# Patient Record
Sex: FEMALE
[De-identification: names, ages, dates, MRNs, and addresses within clinical notes are randomized per-mention and may not be internally consistent; named-entity substitution may affect disease eponyms.]

---

## 2020-11-02 ENCOUNTER — HOSPITAL ENCOUNTER (EMERGENCY)
Dept: HOSPITAL 5 - ED | Age: 60
Discharge: HOME | End: 2020-11-02
Payer: SELF-PAY

## 2020-11-02 VITALS — SYSTOLIC BLOOD PRESSURE: 120 MMHG | DIASTOLIC BLOOD PRESSURE: 89 MMHG

## 2020-11-02 DIAGNOSIS — R91.8: ICD-10-CM

## 2020-11-02 DIAGNOSIS — C22.0: Primary | ICD-10-CM

## 2020-11-02 DIAGNOSIS — C79.51: ICD-10-CM

## 2020-11-02 LAB
ALBUMIN SERPL-MCNC: 3.7 G/DL (ref 3.9–5)
ALBUMIN SERPL-MCNC: 3.7 G/DL (ref 3.9–5)
ALT SERPL-CCNC: 13 UNITS/L (ref 7–56)
ALT SERPL-CCNC: 14 UNITS/L (ref 7–56)
APTT BLD: 29.2 SEC. (ref 24.2–36.6)
BACTERIA #/AREA URNS HPF: (no result) /HPF
BASOPHILS # (AUTO): 0.1 K/MM3 (ref 0–0.1)
BASOPHILS NFR BLD AUTO: 0.7 % (ref 0–1.8)
BILIRUB DIRECT SERPL-MCNC: < 0.2 MG/DL (ref 0–0.2)
BILIRUB UR QL STRIP: (no result)
BLOOD UR QL VISUAL: (no result)
BUN SERPL-MCNC: 6 MG/DL (ref 7–17)
BUN/CREAT SERPL: 12 %
CALCIUM SERPL-MCNC: 9.1 MG/DL (ref 8.4–10.2)
EOSINOPHIL # BLD AUTO: 0.1 K/MM3 (ref 0–0.4)
EOSINOPHIL NFR BLD AUTO: 0.5 % (ref 0–4.3)
HCT VFR BLD CALC: 42.8 % (ref 30.3–42.9)
HEMOLYSIS INDEX: 4
HGB BLD-MCNC: 15.1 GM/DL (ref 10.1–14.3)
INR PPP: 2.37 (ref 0.87–1.13)
LYMPHOCYTES # BLD AUTO: 1.8 K/MM3 (ref 1.2–5.4)
LYMPHOCYTES NFR BLD AUTO: 11.5 % (ref 13.4–35)
MCHC RBC AUTO-ENTMCNC: 35 % (ref 30–34)
MCV RBC AUTO: 91 FL (ref 79–97)
MONOCYTES # (AUTO): 1.6 K/MM3 (ref 0–0.8)
MONOCYTES % (AUTO): 9.7 % (ref 0–7.3)
PH UR STRIP: 6 [PH] (ref 5–7)
PLATELET # BLD: 413 K/MM3 (ref 140–440)
PROT UR STRIP-MCNC: (no result) MG/DL
RBC # BLD AUTO: 4.7 M/MM3 (ref 3.65–5.03)
RBC #/AREA URNS HPF: 2 /HPF (ref 0–6)
UROBILINOGEN UR-MCNC: < 2 MG/DL (ref ?–2)
WBC #/AREA URNS HPF: 1 /HPF (ref 0–6)

## 2020-11-02 PROCEDURE — 83880 ASSAY OF NATRIURETIC PEPTIDE: CPT

## 2020-11-02 PROCEDURE — 81001 URINALYSIS AUTO W/SCOPE: CPT

## 2020-11-02 PROCEDURE — 93005 ELECTROCARDIOGRAM TRACING: CPT

## 2020-11-02 PROCEDURE — 85379 FIBRIN DEGRADATION QUANT: CPT

## 2020-11-02 PROCEDURE — 96374 THER/PROPH/DIAG INJ IV PUSH: CPT

## 2020-11-02 PROCEDURE — 80053 COMPREHEN METABOLIC PANEL: CPT

## 2020-11-02 PROCEDURE — 36415 COLL VENOUS BLD VENIPUNCTURE: CPT

## 2020-11-02 PROCEDURE — 84484 ASSAY OF TROPONIN QUANT: CPT

## 2020-11-02 PROCEDURE — 83735 ASSAY OF MAGNESIUM: CPT

## 2020-11-02 PROCEDURE — 99285 EMERGENCY DEPT VISIT HI MDM: CPT

## 2020-11-02 PROCEDURE — 71045 X-RAY EXAM CHEST 1 VIEW: CPT

## 2020-11-02 PROCEDURE — 85025 COMPLETE CBC W/AUTO DIFF WBC: CPT

## 2020-11-02 PROCEDURE — 71275 CT ANGIOGRAPHY CHEST: CPT

## 2020-11-02 PROCEDURE — 85730 THROMBOPLASTIN TIME PARTIAL: CPT

## 2020-11-02 PROCEDURE — 80076 HEPATIC FUNCTION PANEL: CPT

## 2020-11-02 PROCEDURE — 85610 PROTHROMBIN TIME: CPT

## 2020-11-02 PROCEDURE — 94644 CONT INHLJ TX 1ST HOUR: CPT

## 2020-11-02 NOTE — XRAY REPORT
CHEST 1 VIEW 



INDICATION:  Chest Pain.



COMPARISON:  None



FINDINGS:

Support devices: None. 



Heart: Within normal limits. 

Lungs/Pleura: There is a focal area of ill-defined airspace density in the right upper lobe measuring
 up to 4 cm. The remainder of the lungs are clear. No pleural effusion or pneumothorax. 



Additional findings: Bilateral breast prostheses are noted.



IMPRESSION:

 Right upper lobe airspace opacity is appreciated. This may represent an early infiltrate although ot
her etiologies are not excluded. Consider further evaluation with CT with contrast.



Signer Name: Isaias Horton Jr, MD 

Signed: 11/2/2020 11:46 AM

Workstation Name: [id number]

## 2020-11-02 NOTE — EMERGENCY DEPARTMENT REPORT
ED Shortness of Breath HPI





- General


Chief Complaint: Dyspnea/Respdistress


Stated Complaint: CHEST PAIN


Time Seen by Provider: 11/02/20 10:38


Source: patient


Mode of arrival: Ambulatory


Limitations: No Limitations





- History of Present Illness


Initial Comments: 





60-year-old female, no past medical history, presents to ED with cough and 

shortness of breath.  Patient states symptoms have been ongoing x1 week.  States

cough is productive of sputum.  Patient denies any chest pain, but reports some 

epigastric pain only with cough.  Patient states she was tested for COVID-19 2 

weeks ago which was negative.  She denies fever, sore throat, vomiting, diarrhe

a, loss of smell or taste.  She denies any leg pain or swelling.  Patient 

reports tobacco use.


MD Complaint: shortness of breath


-: week(s) (1)


Severity: moderate


Consistency: constant


Improves With: nothing


Worsens With: nothing


Associated Symptoms: sputum production


Treatments Prior to Arrival: none





- Related Data


Home Oxygen Therapy: No


                                  Previous Rx's











 Medication  Instructions  Recorded  Last Taken  Type


 


Ciprofloxacin HCl [Ciprofloxacin 500 mg PO Q12HR #14 tab 11/02/20 Unknown Rx





TAB]    











                                    Allergies











Allergy/AdvReac Type Severity Reaction Status Date / Time


 


No Known Allergies Allergy   Unverified 11/02/20 10:38














ED Review of Systems


ROS: 


Stated complaint: CHEST PAIN


Other details as noted in HPI





Comment: All other systems reviewed and negative


Constitutional: denies: chills, fever


ENT: denies: throat pain


Respiratory: cough, shortness of breath


Cardiovascular: denies: chest pain


Gastrointestinal: denies: nausea, vomiting, diarrhea


Musculoskeletal: other (Denies leg pain or swelling)





ED Past Medical Hx





- Past Medical History


Previous Medical History?: No





- Surgical History


Past Surgical History?: No





- Social History


Smoking Status: Never Smoker


Substance Use Type: None





- Medications


Home Medications: 


                                Home Medications











 Medication  Instructions  Recorded  Confirmed  Last Taken  Type


 


Ciprofloxacin HCl [Ciprofloxacin 500 mg PO Q12HR #14 tab 11/02/20  Unknown Rx





TAB]     














ED Physical Exam





- General


Limitations: No Limitations


General appearance: alert, in no apparent distress





- Head


Head exam: Present: atraumatic, normocephalic





- Eye


Eye exam: Present: normal appearance, EOMI





- ENT


ENT exam: Present: mucous membranes moist





- Neck


Neck exam: Present: normal inspection





- Respiratory


Respiratory exam: Present: wheezes (Scant), other (Slightly tachypneic)





- Cardiovascular


Cardiovascular Exam: Present: normal rhythm, tachycardia





- GI/Abdominal


GI/Abdominal exam: Present: soft.  Absent: distended, tenderness





- Extremities Exam


Extremities exam: Present: normal inspection.  Absent: pedal edema, calf 

tenderness





- Neurological Exam


Neurological exam: Present: alert, oriented X3





- Psychiatric


Psychiatric exam: Present: normal affect, normal mood





- Skin


Skin exam: Present: warm, dry, intact, normal color





ED Course


                                   Vital Signs











  11/02/20 11/02/20 11/02/20





  10:38 10:59 11:00


 


Temperature 98.5 F  


 


Pulse Rate 140 H 122 H 116 H


 


Pulse Rate [   





Anterior   





Bilateral   





Throughout]   


 


Respiratory 26 H 15 12





Rate   


 


Respiratory   





Rate [Anterior   





Bilateral   





Throughout]   


 


Blood Pressure   146/97


 


Blood Pressure 137/63  





[Right]   


 


O2 Sat by Pulse 97  97





Oximetry   














  11/02/20 11/02/20 11/02/20





  11:02 11:20 11:30


 


Temperature   


 


Pulse Rate 120 H  108 H


 


Pulse Rate [  115 H 





Anterior   





Bilateral   





Throughout]   


 


Respiratory 21  12





Rate   


 


Respiratory  18 





Rate [Anterior   





Bilateral   





Throughout]   


 


Blood Pressure   130/97


 


Blood Pressure 146/97  





[Right]   


 


O2 Sat by Pulse 97  





Oximetry   














  11/02/20 11/02/20 11/02/20





  12:00 13:00 14:30


 


Temperature   


 


Pulse Rate 109 H 114 H 113 H


 


Pulse Rate [   





Anterior   





Bilateral   





Throughout]   


 


Respiratory 14 15 16





Rate   


 


Respiratory   





Rate [Anterior   





Bilateral   





Throughout]   


 


Blood Pressure 140/92 136/80 131/85


 


Blood Pressure   





[Right]   


 


O2 Sat by Pulse 95  94





Oximetry   














  11/02/20 11/02/20 11/02/20





  15:00 15:30 16:00


 


Temperature   


 


Pulse Rate 111 H  


 


Pulse Rate [   





Anterior   





Bilateral   





Throughout]   


 


Respiratory 15  





Rate   


 


Respiratory   





Rate [Anterior   





Bilateral   





Throughout]   


 


Blood Pressure 121/85 120/92 120/89


 


Blood Pressure   





[Right]   


 


O2 Sat by Pulse 94 96 96





Oximetry   














ED Medical Decision Making





- Lab Data


Result diagrams: 


                                 11/02/20 11:04





                                 11/02/20 11:04





- EKG Data


-: EKG Interpreted by Me


EKG shows normal: sinus rhythm, axis, intervals, QRS complexes, ST-T waves


Rate: tachycardia (rate 123)





- EKG Data


Interpretation: no acute changes





- Radiology Data


Radiology results: report reviewed, image reviewed





- Medical Decision Making





60-year-old female, no known medical history, presents to ED with shortness of 

breath, cough productive of sputum.  Patient initially presents tachycardic, 

some wheezing on exam.  O2 sats are normal.  Albuterol nebulizer treatment was 

given.  Patient did have some improvement with this.  Chest x-ray showed concern

 for mass in the right upper lobe.  D-dimer was obtained secondary to concern 

for possible PE.  CTA was then obtained, which was negative for PE, shows a 

likely metastatic process.  There is a right upper lobe mass, lytic bony lesions

 of the thoracic cage, hepatic and renal masses, soft tissue density and lymph 

nodes in the mediastinum, stenosis of the right mainstem bronchus.  I spoke with

 hospitalist, Dr. Pierce, who states patient will benefit from outpatient work-

up.  He spoke with the patient, prescriptions for Cipro was given for possible 

postobstructive pneumonia.  Outpatient follow-up advised.  Return precautions 

given.





- Differential Diagnosis


Pneumonia, PE, COPD


Critical care attestation.: 


If time is entered above; I have spent that time in minutes in the direct care 

of this critically ill patient, excluding procedure time.








ED Disposition


Clinical Impression: 


 Lung mass, Liver metastasis, Bone metastasis





Disposition: DC-01 TO HOME OR SELFCARE


Is pt being admited?: No


Condition: Stable


Prescriptions: 


Ciprofloxacin HCl [Ciprofloxacin TAB] 500 mg PO Q12HR #14 tab


Referrals: 


PRIMARY CARE,MD [Primary Care Provider] - 3-5 Days


[facility] [Provider Group] - 3-5 Days


[facility] [Outside] - 3-5 Days


[facility] [Outside] - 3-5 Days


Time of Disposition: 16:16

## 2020-11-02 NOTE — CAT SCAN REPORT
CTA CHEST WITH CONTRAST



INDICATION : Shortness of breath, cough.



TECHNIQUE:  Axial imaging performed through the chest, with contrast bolus timing set to maximize opa
cification of the pulmonary arteries. Sagittal and coronal reformatted images. 3-plane MIP reformatte
d images were obtained.  All CT scans at this location are performed using CT dose reduction for ALAR
A by means of automated exposure control. 



100 mL of intravenous contrast administered.



COMPARISON:  AP chest performed earlier today



FINDINGS:  



Bolus:  Contrast bolus timing is adequate.

PTE:  No filling defect is present to suggest PTE.

Mediastinum:  Heart size is normal. The thoracic aorta is unremarkable. There is abnormal soft tissue
 density throughout the mediastinum which encircles the alexa, right main pulmonary artery and right
 mainstem bronchus. There appears to be severe stenosis of the right mainstem bronchus. Mildly enlarg
ed mediastinal lymph nodes are also noted. The thyroid gland and esophagus are unremarkable.  

Lungs:  A right upper lobe mass is identified measuring 3.5 x 3.1 x 5.1 cm. There is minor right apic
al scarring as well. The remainder of the lungs are clear. No significant underlying parenchymal lung
 disease is appreciated. No pleural effusion or pneumothorax.

Bones:  Lytic bony lesions are identified involving T11, T12, L1, left lateral fourth rib and left po
sterior seventh rib.



Upper abdomen:  An ill-defined right hepatic lobe mass is identified measuring 3.8 cm. An intermediat
e density right adrenal mass measures 3.0 x 2.3 cm.





IMPRESSION:

 No pulmonary embolus is detected.

A metastatic process is suspected. A right upper lobe mass is identified. There is abnormal soft tiss
ue density and lymph nodes in the mediastinum which encircle the right hilum and alexa. Multiple lyt
ic bony lesions are identified in the thoracic cage as described. A right hepatic lobe mass and right
 adrenal mass are identified. This presumably represents metastatic lung cancer.



Signer Name: Isaias Horton Jr, MD 

Signed: 11/2/2020 1:15 PM

Workstation Name: [id number]

## 2020-11-02 NOTE — EMERGENCY DEPARTMENT REPORT
Blank Doc





- Documentation


Documentation: 





60-year-old female that presents with SOB and chest pains.





This initial assessment/diagnostic orders/clinical plan/treatment(s) is/are 

subject to change based on patient's health status, clinical progression and re-

assessment by fellow clinical providers in the ED.  Further treatment and workup

at subsequent clinical providers discretion.  Patient/guardians urged not to 

elope from the ED as their condition may be serious if not clinically assessed 

and managed.  Initial orders include:


1- Patient sent to [facility] for further evaluation and treatment


2- cardiac workup

## 2020-11-10 ENCOUNTER — HOSPITAL ENCOUNTER (INPATIENT)
Dept: HOSPITAL 5 - ED | Age: 60
LOS: 10 days | Discharge: HOME | DRG: 871 | End: 2020-11-20
Attending: INTERNAL MEDICINE | Admitting: INTERNAL MEDICINE
Payer: COMMERCIAL

## 2020-11-10 DIAGNOSIS — Z20.828: ICD-10-CM

## 2020-11-10 DIAGNOSIS — E87.6: ICD-10-CM

## 2020-11-10 DIAGNOSIS — J43.9: ICD-10-CM

## 2020-11-10 DIAGNOSIS — J18.9: ICD-10-CM

## 2020-11-10 DIAGNOSIS — C79.51: ICD-10-CM

## 2020-11-10 DIAGNOSIS — C78.7: ICD-10-CM

## 2020-11-10 DIAGNOSIS — A41.9: Primary | ICD-10-CM

## 2020-11-10 DIAGNOSIS — Z87.891: ICD-10-CM

## 2020-11-10 DIAGNOSIS — E06.9: ICD-10-CM

## 2020-11-10 DIAGNOSIS — E87.1: ICD-10-CM

## 2020-11-10 DIAGNOSIS — C34.11: ICD-10-CM

## 2020-11-10 DIAGNOSIS — J96.01: ICD-10-CM

## 2020-11-10 LAB
ALBUMIN SERPL-MCNC: 3.5 G/DL (ref 3.9–5)
ALT SERPL-CCNC: 11 UNITS/L (ref 7–56)
BASOPHILS # (AUTO): 0.2 K/MM3 (ref 0–0.1)
BASOPHILS NFR BLD AUTO: 1.4 % (ref 0–1.8)
BUN SERPL-MCNC: 4 MG/DL (ref 7–17)
BUN/CREAT SERPL: 10 %
CALCIUM SERPL-MCNC: 9.1 MG/DL (ref 8.4–10.2)
EOSINOPHIL # BLD AUTO: 0.2 K/MM3 (ref 0–0.4)
EOSINOPHIL NFR BLD AUTO: 1.3 % (ref 0–4.3)
HCO3 BLDA-SCNC: 23.7 MMOL/L (ref 20–26)
HCT VFR BLD CALC: 43.9 % (ref 30.3–42.9)
HEMOLYSIS INDEX: 10
HGB BLD-MCNC: 14.9 GM/DL (ref 10.1–14.3)
LYMPHOCYTES # BLD AUTO: 2.1 K/MM3 (ref 1.2–5.4)
LYMPHOCYTES NFR BLD AUTO: 13.1 % (ref 13.4–35)
MCHC RBC AUTO-ENTMCNC: 34 % (ref 30–34)
MCV RBC AUTO: 93 FL (ref 79–97)
MONOCYTES # (AUTO): 1.6 K/MM3 (ref 0–0.8)
MONOCYTES % (AUTO): 10 % (ref 0–7.3)
PCO2 BLDA: 34.3 MM HG
PH BLDA: 7.46 PH UNITS (ref 7.35–7.45)
PLATELET # BLD: 359 K/MM3 (ref 140–440)
PO2 BLDA: 70.4 MM HG (ref 80–90)
RBC # BLD AUTO: 4.72 M/MM3 (ref 3.65–5.03)
T4 FREE SERPL-MCNC: 1.66 NG/DL (ref 0.76–1.46)

## 2020-11-10 PROCEDURE — 84443 ASSAY THYROID STIM HORMONE: CPT

## 2020-11-10 PROCEDURE — 80048 BASIC METABOLIC PNL TOTAL CA: CPT

## 2020-11-10 PROCEDURE — 88341 IMHCHEM/IMCYTCHM EA ADD ANTB: CPT

## 2020-11-10 PROCEDURE — 83735 ASSAY OF MAGNESIUM: CPT

## 2020-11-10 PROCEDURE — 71275 CT ANGIOGRAPHY CHEST: CPT

## 2020-11-10 PROCEDURE — 4A033R1 MEASUREMENT OF ARTERIAL SATURATION, PERIPHERAL, PERCUTANEOUS APPROACH: ICD-10-PCS | Performed by: INTERNAL MEDICINE

## 2020-11-10 PROCEDURE — 94644 CONT INHLJ TX 1ST HOUR: CPT

## 2020-11-10 PROCEDURE — 84439 ASSAY OF FREE THYROXINE: CPT

## 2020-11-10 PROCEDURE — 88305 TISSUE EXAM BY PATHOLOGIST: CPT

## 2020-11-10 PROCEDURE — 85025 COMPLETE CBC W/AUTO DIFF WBC: CPT

## 2020-11-10 PROCEDURE — 94760 N-INVAS EAR/PLS OXIMETRY 1: CPT

## 2020-11-10 PROCEDURE — 94640 AIRWAY INHALATION TREATMENT: CPT

## 2020-11-10 PROCEDURE — 96375 TX/PRO/DX INJ NEW DRUG ADDON: CPT

## 2020-11-10 PROCEDURE — 71046 X-RAY EXAM CHEST 2 VIEWS: CPT

## 2020-11-10 PROCEDURE — 85007 BL SMEAR W/DIFF WBC COUNT: CPT

## 2020-11-10 PROCEDURE — U0003 INFECTIOUS AGENT DETECTION BY NUCLEIC ACID (DNA OR RNA); SEVERE ACUTE RESPIRATORY SYNDROME CORONAVIRUS 2 (SARS-COV-2) (CORONAVIRUS DISEASE [COVID-19]), AMPLIFIED PROBE TECHNIQUE, MAKING USE OF HIGH THROUGHPUT TECHNOLOGIES AS DESCRIBED BY CMS-2020-01-R: HCPCS

## 2020-11-10 PROCEDURE — 82803 BLOOD GASES ANY COMBINATION: CPT

## 2020-11-10 PROCEDURE — 88342 IMHCHEM/IMCYTCHM 1ST ANTB: CPT

## 2020-11-10 PROCEDURE — 85730 THROMBOPLASTIN TIME PARTIAL: CPT

## 2020-11-10 PROCEDURE — 84484 ASSAY OF TROPONIN QUANT: CPT

## 2020-11-10 PROCEDURE — 77012 CT SCAN FOR NEEDLE BIOPSY: CPT

## 2020-11-10 PROCEDURE — 70491 CT SOFT TISSUE NECK W/DYE: CPT

## 2020-11-10 PROCEDURE — 96374 THER/PROPH/DIAG INJ IV PUSH: CPT

## 2020-11-10 PROCEDURE — 87040 BLOOD CULTURE FOR BACTERIA: CPT

## 2020-11-10 PROCEDURE — 80053 COMPREHEN METABOLIC PANEL: CPT

## 2020-11-10 PROCEDURE — 94660 CPAP INITIATION&MGMT: CPT

## 2020-11-10 PROCEDURE — 36415 COLL VENOUS BLD VENIPUNCTURE: CPT

## 2020-11-10 PROCEDURE — 93005 ELECTROCARDIOGRAM TRACING: CPT

## 2020-11-10 PROCEDURE — 85610 PROTHROMBIN TIME: CPT

## 2020-11-10 PROCEDURE — 71045 X-RAY EXAM CHEST 1 VIEW: CPT

## 2020-11-10 RX ADMIN — Medication SCH ML: at 22:46

## 2020-11-10 RX ADMIN — DEXTROSE AND SODIUM CHLORIDE SCH MLS/HR: 5; .9 INJECTION, SOLUTION INTRAVENOUS at 22:47

## 2020-11-10 RX ADMIN — FAMOTIDINE SCH MG: 10 INJECTION, SOLUTION INTRAVENOUS at 22:48

## 2020-11-10 RX ADMIN — HYDROMORPHONE HYDROCHLORIDE PRN MG: 1 INJECTION, SOLUTION INTRAMUSCULAR; INTRAVENOUS; SUBCUTANEOUS at 22:58

## 2020-11-10 NOTE — CAT SCAN REPORT
CT angio chest



INDICATION / CLINICAL INFORMATION:

 stidor and right neck swelling, cancer mets.



TECHNIQUE:

Axial CT images were obtained after injection of Omnipaque 350, 100 cc IV contrast using CTA protocol
. 3 plane MIP / 3D reconstructions were produced. All CT scans at this location are performed using C
T dose reduction for ALARA by means of automated exposure control. 



COMPARISON:

CTA chest 11/2/2020.



FINDINGS:

Please see CTA neck for discussion of the lower neck.



A peripheral right upper lobe mass remains with maximal size of 3.7 cm. Previous size was 3.5. Patchy
 nodularity is again seen at the right upper lobe medially. There is also extensive tumor at the righ
t hilum extending into the mediastinum. Parenchymal changes remain at the right middle lobe.



Patchy nodularity is now seen at the right lower lobe into a lesser extent the left lower lobe. Bronc
hi are partially filled with fluid.



Extensive mediastinal adenopathy and tumor infiltration is unchanged. Metastatic involvement with par
tial destruction involves the left fourth and seventh ribs and multiple thoracic vertebral bodies.



Negative for pulmonary embolus or pneumonia. Mild prominence of the ascending thoracic aorta is uncha
nged. Narrowing of the central right pulmonary arteries due to tumor encasement is again noted. Narro
wing of the IVC is seen centrally.



A 4.4 cm right adrenal mass and 4.2 cm right hepatic lesion is again noted.



IMPRESSION:

1. Suspected primary right lung carcinoma with extensive metastatic involvement including the right h
ilum, multiple ribs, multiple vertebral bodies, liver and likely the right adrenal gland.

2. Suspect mild pneumonia at the lung bases.



Signer Name: Pilo Posada MD 

Signed: 11/10/2020 6:31 PM

Workstation Name: [id number]

## 2020-11-10 NOTE — XRAY REPORT
CHEST 2 VIEWS 



INDICATION: 

Dyspnea.



COMPARISON: 

11/2/2020.



FINDINGS:

Support devices: None.



Heart: Within normal limits. 

Lungs/Pleura: Persistent right upper lobe mass without significant change. No new infiltrate.   No si
gnificant pleural effusion. 



IMPRESSION:  

1. Stable right upper lobe mass.

2. No new infiltrate.



Signer Name: Pilo Posada MD 

Signed: 11/10/2020 4:49 PM

Workstation Name: [id number]

## 2020-11-10 NOTE — HISTORY AND PHYSICAL REPORT
History of Present Illness


Date of examination: 11/10/20


Date of admission: 


11/10/20 19:12





Chief complaint: 


Shortness of breath 3 to 4 days





History of present illness: 





60-year-old female presents to the hospital complaining of progressively 

worsening dyspnea for the last 2 weeks.  Patient was seen here on November 2 for

similar symptoms and had a CT angiogram chest that was negative for pulmonary 

embolism however, shows a likely metastatic process.  There is a right upper 

lobe mass, lytic bony lesions of the thoracic cage, hepatic and renal masses, 

soft tissue density and lymph nodes in the mediastinum, stenosis of the right 

mainstem bronchus.  Patient did receive bronchodilators in the ED for wheezing. 

Hospitalist evaluated patient and discharged patient on Cipro due to possibility

of postobstructive pneumonia.  Patient completed Cipro as prescribed and states 

did help with her productive cough.  Last night she developed worsening 

shortness of breath unrelieved by her albuterol that was prescribed as an 

outpatient prior to ED evaluation.  Patient presents with possible stridor with 

wheeze on inspiration, tachypnea, and tachycardia.  No reports of fever.  She 

quit smoking 1 month ago.  He has not been able to follow-up as an outpatient 

since her ED visit.

















- Past Medical History


Previous Medical History?: Yes


Hx of Cancer: Yes (metastic lung liver and spine)





- Surgical History


Past Surgical History?: No





- Social History


Smoking Status: Former Smoker


Substance Use Type: None





- Medications


Home Medications: 


                                Home Medications











 Medication  Instructions  Recorded  Confirmed  Last Taken  Type


 


Ciprofloxacin HCl [Ciprofloxacin 500 mg PO Q12HR #14 tab 11/02/20  Unknown Rx





TAB]     














  Review of Systems 


ROS: 


Stated complaint: SILVANA


Other details as noted in HPI





Comment: All other systems reviewed and negative











Medications and Allergies


                                    Allergies











Allergy/AdvReac Type Severity Reaction Status Date / Time


 


No Known Allergies Allergy   Verified 11/10/20 22:14











                                Home Medications











 Medication  Instructions  Recorded  Confirmed  Last Taken  Type


 


Ciprofloxacin HCl [Ciprofloxacin 500 mg PO Q12HR #14 tab 11/02/20  Unknown Rx





TAB]     














Exam





- Constitutional


Vitals: 


                                        











Temp Pulse Resp BP Pulse Ox


 


 97.4 F L  130 H  22   119/80   95 


 


 11/10/20 15:39  11/10/20 20:10  11/10/20 21:00  11/10/20 21:00  11/10/20 21:00











General appearance: Present: mild distress, well-nourished





- EENT


Eyes: Present: PERRL


ENT: hearing intact, clear oral mucosa





- Neck


Neck: Present: supple, normal ROM





- Respiratory


Respiratory effort: normal


Respiratory: bilateral: diminished, rhonchi, wheezing





- Cardiovascular


Heart rate: 78


Rhythm: regular


Heart Sounds: Present: S1 & S2.  Absent: rub, click





- Extremities


Extremities: no ischemia, pulses symmetrical, No edema


Peripheral Pulses: within normal limits





- Abdominal


General gastrointestinal: Present: soft, non-tender, non-distended, normal bowel

sounds


Female genitourinary: Present: normal





- Rectal


Rectal Exam: deferred





- Integumentary


Integumentary: Present: clear, warm, dry





- Musculoskeletal


Musculoskeletal: gait normal, strength equal bilaterally





- Psychiatric


Psychiatric: appropriate mood/affect, intact judgment & insight





- Neurologic


Neurologic: CNII-XII intact, moves all extremities





- Allied Health


Allied health notes reviewed: nursing, case management





HEART Score





- HEART Score


Troponin: 


                                        











Troponin T  < 0.010 ng/mL (0.00-0.029)   11/10/20  16:09    














Results





- Labs


CBC & Chem 7: 


                                 11/10/20 16:09





                                 11/10/20 16:09


Labs: 


                             Laboratory Last Values











WBC  16.0 K/mm3 (4.5-11.0)  H  11/10/20  16:09    


 


RBC  4.72 M/mm3 (3.65-5.03)   11/10/20  16:09    


 


Hgb  14.9 gm/dl (10.1-14.3)  H  11/10/20  16:09    


 


Hct  43.9 % (30.3-42.9)  H  11/10/20  16:09    


 


MCV  93 fl (79-97)   11/10/20  16:09    


 


MCH  32 pg (28-32)   11/10/20  16:09    


 


MCHC  34 % (30-34)   11/10/20  16:09    


 


RDW  13.8 % (13.2-15.2)   11/10/20  16:09    


 


Plt Count  359 K/mm3 (140-440)   11/10/20  16:09    


 


Lymph % (Auto)  13.1 % (13.4-35.0)  L  11/10/20  16:09    


 


Mono % (Auto)  10.0 % (0.0-7.3)  H  11/10/20  16:09    


 


Eos % (Auto)  1.3 % (0.0-4.3)   11/10/20  16:09    


 


Baso % (Auto)  1.4 % (0.0-1.8)   11/10/20  16:09    


 


Lymph # (Auto)  2.1 K/mm3 (1.2-5.4)   11/10/20  16:09    


 


Mono # (Auto)  1.6 K/mm3 (0.0-0.8)  H  11/10/20  16:09    


 


Eos # (Auto)  0.2 K/mm3 (0.0-0.4)   11/10/20  16:09    


 


Baso # (Auto)  0.2 K/mm3 (0.0-0.1)  H  11/10/20  16:09    


 


Seg Neutrophils %  74.2 % (40.0-70.0)  H  11/10/20  16:09    


 


Seg Neutrophils #  11.9 K/mm3 (1.8-7.7)  H  11/10/20  16:09    


 


ABG pH  7.458 pH Units (7.350-7.450)  H  11/10/20  17:15    


 


ABG pCO2  34.3 mm Hg  11/10/20  17:15    


 


ABG pO2  70.4 mm Hg (80.0-90.0)  L  11/10/20  17:15    


 


ABG HCO3  23.7 mmol/L (20.0-26.0)   11/10/20  17:15    


 


ABG O2 Saturation  95.8 % (95.0-99.0)   11/10/20  17:15    


 


ABG O2 Content  19.0  (0.0-44)   11/10/20  17:15    


 


ABG Base Excess  0.4 mmol/L (-2.0-3.0)   11/10/20  17:15    


 


ABG Hemoglobin  14.5 gm/dl (12.0-16.0)   11/10/20  17:15    


 


ABG Carboxyhemoglobin  2.1 % (0.0-5.0)   11/10/20  17:15    


 


ABG Methemoglobin  0.5 % (0.0-1.5)   11/10/20  17:15    


 


Oxyhemoglobin  93.3 % (95.0-99.0)  L  11/10/20  17:15    


 


FiO2  21 %  11/10/20  17:15    


 


Sodium  132 mmol/L (137-145)  L  11/10/20  16:09    


 


Potassium  3.5 mmol/L (3.6-5.0)  L  11/10/20  16:09    


 


Chloride  92.8 mmol/L ()  L  11/10/20  16:09    


 


Carbon Dioxide  26 mmol/L (22-30)   11/10/20  16:09    


 


Anion Gap  17 mmol/L  11/10/20  16:09    


 


BUN  4 mg/dL (7-17)  L  11/10/20  16:09    


 


Creatinine  0.4 mg/dL (0.6-1.2)  L  11/10/20  16:09    


 


Estimated GFR  > 60 ml/min  11/10/20  16:09    


 


BUN/Creatinine Ratio  10 %  11/10/20  16:09    


 


Glucose  96 mg/dL ()   11/10/20  16:09    


 


Calcium  9.1 mg/dL (8.4-10.2)   11/10/20  16:09    


 


Magnesium  2.00 mg/dL (1.7-2.3)   11/10/20  16:00    


 


Total Bilirubin  0.40 mg/dL (0.1-1.2)   11/10/20  16:09    


 


AST  12 units/L (5-40)   11/10/20  16:09    


 


ALT  11 units/L (7-56)   11/10/20  16:09    


 


Alkaline Phosphatase  74 units/L ()   11/10/20  16:09    


 


Troponin T  < 0.010 ng/mL (0.00-0.029)   11/10/20  16:09    


 


Total Protein  6.5 g/dL (6.3-8.2)   11/10/20  16:09    


 


Albumin  3.5 g/dL (3.9-5)  L  11/10/20  16:09    


 


Albumin/Globulin Ratio  1.2 %  11/10/20  16:09    


 


TSH  2.560 mlU/mL (0.270-4.200)   11/10/20  19:01    


 


Free T4  1.66 ng/dL (0.76-1.46)  H  11/10/20  19:01    











Microbiology: 


Microbiology





11/10/20 19:09   Peripheral/Venous   Blood Culture - Preliminary


                            Culture in Progress


11/10/20 19:01   Peripheral/Venous   Blood Culture - Preliminary


                            Culture in Progress











- Imaging and Cardiology


Chest x-ray: report reviewed


Imaging and Cardiology: 





Chest x-ray


Stable right upper lobe mass


No new infiltrate





Chest CTA


Suspected primary right lung carcinoma with extensive metastatic involvement 

including the right hilum multiple ribs multiple vertebral bodies liver and 

likely the right adrenal mass


Suspect mild pneumonia in the lung bases





Neck CT


Findings concerning for thyroiditis


Adenopathy identified as described above left lower thyroid lesion noted right 

upper lobe lung lesion suspected findings are worrisome for neoplastic process 

with metastatic disease 


Montilla/IV: 


                                        





IV Catheter Type [Right          INT / [facility]


Antecubital]                     











Assessment and Plan


Advance Directives: Yes (full code)


Plan of care discussed with patient/family: Yes





- Patient Problems


(1) SIRS (systemic inflammatory response syndrome)


Current Visit: Yes   Status: Acute   


Plan to address problem: 


Patient has a high white count, tachypnea and tachycardia








(2) Pneumonia


Current Visit: Yes   Status: Acute   


Plan to address problem: 


Treat as community-acquired pneumonia


Postobstructive pneumonia unlikely








(3) Lung cancer metastatic to bone


Current Visit: Yes   Status: Acute   


Plan to address problem: 


Patient needs follow-up with oncology/radiation therapy for possible 

chemotherapy and radiation therapy


Patient counseled about outpatient follow-up








(4) COPD (chronic obstructive pulmonary disease)


Current Visit: Yes   Status: Chronic   


Plan to address problem: 


Patient on duo nebs and Solu-Medrol and antibiotics








(5) Hyponatremia


Current Visit: Yes   Status: Acute   


Plan to address problem: 


Should correct with IV fluids








(6) Hypokalemia


Current Visit: Yes   Status: Acute   


Plan to address problem: 


Supplemented








(7) DVT prophylaxis


Current Visit: Yes   Status: Acute   


Plan to address problem: 


On heparin and GI prophylaxis

## 2020-11-10 NOTE — EVENT NOTE
ED Screening Note


ED Screening Note: 





60-year-old female smoker with a known history of metastatic breast cancer which

is appreciated liver presents emerged department complaining of a 2 to 3-day 

history of progressively worsening dyspnea which worsens with exertion.  She 

reports weakness decreased appetite no bowel movement for last 5 days as well.  

Lower substernal chest pain as well








This initial assessment/diagnostic orders/clinical plan/treatment(s) is/are 

subject to change based on patients health status, clinical progression and re-

assessment by fellow clinical providers in the ED. Further treatment and workup 

at subsequent clinical providers discretion. Patient/guardian urged not to elope

from the ED as their condition may be serious if not clinically assessed and 

managed. 





Initial orders include:

## 2020-11-10 NOTE — CAT SCAN REPORT
. CT neck w con



INDICATION / CLINICAL INFORMATION:

60 years Female;  stidor and right neck swelling, cancer mets.



TECHNIQUE:

Contiguous thin cut axial images obtained through the neck following IV contrast. Sagittal and corona
l reconstructions performed by the technologist. All CT scans at this location are performed using CT
 dose reduction for ALARA by means of automated exposure control. 



COMPARISON:

None available.



FINDINGS:



MUCOSAL SPACE: The nasopharynx, oropharynx and vallecula, oral cavity and floor of mouth, hypopharynx
, and larynx are grossly normal.



Small internal laryngoceles seen bilaterally - right more prominent than left.



LYMPH NODES: Necrotic lymph node is seen in the right level 6 region, inferior and posterior to the t
hyroid gland. Abnormal appearing level 5 lymph nodes are seen bilaterally - left greater than right.



SALIVARY GLANDS: Parotid, submandibular, and visualized sublingual glands are within normal limits. 



THYROID GLAND: Thyroid gland is markedly swollen with edematous areas, as well as periglandular infla
mmation. Thyroiditis would be a consideration. There is mild edema extending into the larynx, althoug
h the airway does not appear to be significantly compromised.



PARANASAL SINUSES: Visualized paranasal sinuses and mastoid air cells are essentially clear.



SPINE: No significant abnormality of the cervical spine appreciated.



VASCULAR STRUCTURES: Vascular structures are grossly normal in appearance.



ADDITIONAL FINDINGS: Destructive bone lesion is seen in the head of the clavicle on the left, along i
ts anterior margin.



Scarring type changes seen in the right lung apex. There may be a lung lesion in the right upper lobe
, which is partially visualized. This finding has somewhat of a spiculated type appearance.



Poor dentition noted.



IMPRESSION:

1. Findings concerning for thyroiditis as described above. Mild laryngeal edema may be present.

2. Adenopathy identified, as described above. Left clavicular lesion noted. Right upper lobe lung les
ion suspected. Findings are worrisome for neoplastic process with metastatic disease. Please clinical
ly correlate.



Signer Name: Abiodun Laurent MD, III 

Signed: 11/10/2020 6:45 PM

Workstation Name: [id number]

## 2020-11-10 NOTE — EMERGENCY DEPARTMENT REPORT
ED Shortness of Breath HPI





- General


Chief Complaint: Dyspnea/Respdistress


Stated Complaint: SILVANA


Time Seen by Provider: 11/10/20 17:06


Source: patient, family, old records reviewed


Mode of arrival: Wheelchair


Limitations: No Limitations





- History of Present Illness


Initial Comments: 





60-year-old female presents to the hospital complaining of progressively 

worsening dyspnea for the last 2 weeks.  Patient was seen here on November 2 for

similar symptoms and had a CT angiogram chest that was negative for pulmonary 

embolism however, shows a likely metastatic process.  There is a right upper 

lobe mass, lytic bony lesions of the thoracic cage, hepatic and renal masses, 

soft tissue density and lymph nodes in the mediastinum, stenosis of the right 

mainstem bronchus.  Patient did receive bronchodilators in the ED for wheezing. 

Hospitalist evaluated patient and discharged patient on Cipro due to possibility

of postobstructive pneumonia.  Patient completed Cipro as prescribed and states 

did help with her productive cough.  Last night she developed worsening 

shortness of breath unrelieved by her albuterol that was prescribed as an 

outpatient prior to ED evaluation.  Patient presents with possible stridor with 

wheeze on inspiration, tachypnea, and tachycardia.  No reports of fever.  She 

quit smoking 1 month ago.  He has not been able to follow-up as an outpatient 

since her ED visit.





- Related Data


                                  Previous Rx's











 Medication  Instructions  Recorded  Last Taken  Type


 


Ciprofloxacin HCl [Ciprofloxacin 500 mg PO Q12HR #14 tab 11/02/20 Unknown Rx





TAB]    











                                    Allergies











Allergy/AdvReac Type Severity Reaction Status Date / Time


 


No Known Allergies Allergy   Verified 11/10/20 22:14














ED Review of Systems


ROS: 


Stated complaint: SILVANA


Other details as noted in HPI





Comment: All other systems reviewed and negative





ED Past Medical Hx





- Past Medical History


Previous Medical History?: Yes


Hx of Cancer: Yes (metastic lung liver and spine)





- Surgical History


Past Surgical History?: No





- Social History


Smoking Status: Former Smoker


Substance Use Type: None





- Medications


Home Medications: 


                                Home Medications











 Medication  Instructions  Recorded  Confirmed  Last Taken  Type


 


Ciprofloxacin HCl [Ciprofloxacin 500 mg PO Q12HR #14 tab 11/02/20  Unknown Rx





TAB]     














ED Physical Exam





- General


Limitations: No Limitations





- Other


Other exam information: 





General: Moderate distress


Head: Atraumatic


Eyes: normal appearance


ENT: Moist mucous membranes


Neck: Probable mediastinum/mass swelling to the right neck with questionable 

stridor


Chest: Inspiratory wheeze/stridor with tachypnea and accessory muscle use


CV: Tachycardic regular


Abdomen: Soft, normal bowel sounds, nontender, nondistended, no rebound or 

guarding


Back: Normal inspection


Extremity: Normal inspection, full range of motion, no calf tenderness or leg 

edema


Neuro: Alert O x 3, no facial asymmetry, speech clear, no gross motor sensory 

deficit


Psych: Appropriate behavior


Skin: No rash





ED Course


                                   Vital Signs











  11/10/20 11/10/20 11/10/20





  15:39 17:24 17:30


 


Temperature 97.4 F L  


 


Pulse Rate 129 H  115 H


 


Pulse Rate [   





Bilateral]   


 


Respiratory 24  21





Rate   


 


Respiratory   





Rate [Bilateral   





]   


 


Blood Pressure   133/93


 


Blood Pressure 106/68  





[Right]   


 


O2 Sat by Pulse 90 97 95





Oximetry   














  11/10/20 11/10/20 11/10/20





  17:41 17:45 18:11


 


Temperature   


 


Pulse Rate  116 H 121 H


 


Pulse Rate [ 96 H  





Bilateral]   


 


Respiratory  22 26 H





Rate   


 


Respiratory 19  





Rate [Bilateral   





]   


 


Blood Pressure  133/93 133/93


 


Blood Pressure   





[Right]   


 


O2 Sat by Pulse  95 





Oximetry   














  11/10/20 11/10/20 11/10/20





  18:21 19:00 19:30


 


Temperature   


 


Pulse Rate  121 H 126 H


 


Pulse Rate [   





Bilateral]   


 


Respiratory 16 18 20





Rate   


 


Respiratory   





Rate [Bilateral   





]   


 


Blood Pressure  121/85 143/86


 


Blood Pressure   





[Right]   


 


O2 Sat by Pulse  89 93





Oximetry   














  11/10/20 11/10/20 11/10/20





  20:00 20:10 20:30


 


Temperature   


 


Pulse Rate 128 H 130 H 


 


Pulse Rate [   





Bilateral]   


 


Respiratory 19 19 22





Rate   


 


Respiratory   





Rate [Bilateral   





]   


 


Blood Pressure 137/90 143/86 137/90


 


Blood Pressure   





[Right]   


 


O2 Sat by Pulse 96 97 94





Oximetry   














  11/10/20 11/10/20 11/10/20





  21:00 21:30 22:00


 


Temperature   


 


Pulse Rate   108 H


 


Pulse Rate [   





Bilateral]   


 


Respiratory 22 18 18





Rate   


 


Respiratory   





Rate [Bilateral   





]   


 


Blood Pressure 119/80 120/84 116/77


 


Blood Pressure   





[Right]   


 


O2 Sat by Pulse 95 98 





Oximetry   














  11/10/20 11/10/20 11/10/20





  22:21 22:30 23:00


 


Temperature   


 


Pulse Rate 107 H 112 H 120 H


 


Pulse Rate [   





Bilateral]   


 


Respiratory 19 18 28 H





Rate   


 


Respiratory   





Rate [Bilateral   





]   


 


Blood Pressure 116/77 113/77 128/88


 


Blood Pressure   





[Right]   


 


O2 Sat by Pulse 97  95





Oximetry   














  11/10/20 11/11/20 11/11/20





  23:30 00:00 00:30


 


Temperature   


 


Pulse Rate 107 H 105 H 108 H


 


Pulse Rate [   





Bilateral]   


 


Respiratory 15 14 17





Rate   


 


Respiratory   





Rate [Bilateral   





]   


 


Blood Pressure 117/78 116/75 123/85


 


Blood Pressure   





[Right]   


 


O2 Sat by Pulse  97 98





Oximetry   














  11/11/20 11/11/20 11/11/20





  00:55 01:00 01:30


 


Temperature   


 


Pulse Rate 117 H 119 H 106 H


 


Pulse Rate [   





Bilateral]   


 


Respiratory 18 17 13





Rate   


 


Respiratory   





Rate [Bilateral   





]   


 


Blood Pressure 123/85 109/83 116/80


 


Blood Pressure   





[Right]   


 


O2 Sat by Pulse 98 94 97





Oximetry   














  11/11/20 11/11/20 11/11/20





  02:00 02:30 03:00


 


Temperature   


 


Pulse Rate 103 H 109 H 102 H


 


Pulse Rate [   





Bilateral]   


 


Respiratory 13 18 14





Rate   


 


Respiratory   





Rate [Bilateral   





]   


 


Blood Pressure 119/79 123/94 117/77


 


Blood Pressure   





[Right]   


 


O2 Sat by Pulse 98 98 





Oximetry   














  11/11/20 11/11/20 11/11/20





  03:30 04:00 04:30


 


Temperature   


 


Pulse Rate 105 H 100 H 102 H


 


Pulse Rate [   





Bilateral]   


 


Respiratory 17 13 14





Rate   


 


Respiratory   





Rate [Bilateral   





]   


 


Blood Pressure 122/79 113/77 126/77


 


Blood Pressure   





[Right]   


 


O2 Sat by Pulse  95 95





Oximetry   














  11/11/20 11/11/20 11/11/20





  04:55 05:00 05:30


 


Temperature   


 


Pulse Rate 105 H 92 H 89


 


Pulse Rate [   





Bilateral]   


 


Respiratory 17 11 L 10 L





Rate   


 


Respiratory   





Rate [Bilateral   





]   


 


Blood Pressure 126/77 121/86 126/81


 


Blood Pressure   





[Right]   


 


O2 Sat by Pulse 97  99





Oximetry   














  11/11/20 11/11/20 11/11/20





  06:00 06:30 07:00


 


Temperature   


 


Pulse Rate 95 H 92 H 93 H


 


Pulse Rate [   





Bilateral]   


 


Respiratory 11 L 12 12





Rate   


 


Respiratory   





Rate [Bilateral   





]   


 


Blood Pressure 129/74 130/77 132/78


 


Blood Pressure   





[Right]   


 


O2 Sat by Pulse 98  97





Oximetry   














  11/11/20 11/11/20 11/11/20





  08:00 08:34 09:12


 


Temperature   


 


Pulse Rate 94 H  


 


Pulse Rate [  110 H 





Bilateral]   


 


Respiratory 13  





Rate   


 


Respiratory  20 





Rate [Bilateral   





]   


 


Blood Pressure 123/81  


 


Blood Pressure   





[Right]   


 


O2 Sat by Pulse 97  95





Oximetry   














  11/11/20 11/11/20 11/11/20





  10:00 11:00 12:01


 


Temperature   


 


Pulse Rate 105 H 90 


 


Pulse Rate [   





Bilateral]   


 


Respiratory 18 13 20





Rate   


 


Respiratory   





Rate [Bilateral   





]   


 


Blood Pressure 119/76 108/84 126/91


 


Blood Pressure   





[Right]   


 


O2 Sat by Pulse 97 92 92





Oximetry   














  11/11/20 11/11/20 11/11/20





  12:31 13:01 13:43


 


Temperature   


 


Pulse Rate  98 H 


 


Pulse Rate [   104 H





Bilateral]   


 


Respiratory  13 





Rate   


 


Respiratory   20





Rate [Bilateral   





]   


 


Blood Pressure 126/91 128/83 


 


Blood Pressure   





[Right]   


 


O2 Sat by Pulse 96  





Oximetry   














  11/11/20 11/11/20 11/11/20





  14:00 15:01 16:00


 


Temperature   


 


Pulse Rate 103 H  


 


Pulse Rate [   





Bilateral]   


 


Respiratory 29 H  





Rate   


 


Respiratory   





Rate [Bilateral   





]   


 


Blood Pressure 111/70 111/70 131/80


 


Blood Pressure   





[Right]   


 


O2 Sat by Pulse 95 96 97





Oximetry   














  11/11/20 11/11/20 11/11/20





  16:31 17:03 17:30


 


Temperature   


 


Pulse Rate   


 


Pulse Rate [  107 H 





Bilateral]   


 


Respiratory   





Rate   


 


Respiratory  20 





Rate [Bilateral   





]   


 


Blood Pressure 131/80  142/93


 


Blood Pressure   





[Right]   


 


O2 Sat by Pulse 95  98





Oximetry   














  11/11/20 11/11/20 11/11/20





  18:30 19:00 19:30


 


Temperature   


 


Pulse Rate 108 H 109 H 92 H


 


Pulse Rate [   





Bilateral]   


 


Respiratory 11 L 17 13





Rate   


 


Respiratory   





Rate [Bilateral   





]   


 


Blood Pressure 119/61 129/70 123/78


 


Blood Pressure   





[Right]   


 


O2 Sat by Pulse 98 98 95





Oximetry   














  11/11/20 11/11/20 11/11/20





  20:00 20:30 21:00


 


Temperature   


 


Pulse Rate 116 H 104 H 106 H


 


Pulse Rate [   





Bilateral]   


 


Respiratory 12 15 11 L





Rate   


 


Respiratory   





Rate [Bilateral   





]   


 


Blood Pressure 125/87 115/74 119/68


 


Blood Pressure   





[Right]   


 


O2 Sat by Pulse 95 98 100





Oximetry   














  11/11/20 11/11/20 11/11/20





  21:30 22:00 22:30


 


Temperature   


 


Pulse Rate 104 H 106 H 105 H


 


Pulse Rate [   





Bilateral]   


 


Respiratory 21 15 17





Rate   


 


Respiratory   





Rate [Bilateral   





]   


 


Blood Pressure 104/60 108/65 112/74


 


Blood Pressure   





[Right]   


 


O2 Sat by Pulse 99 99 95





Oximetry   














  11/11/20 11/11/20 11/11/20





  23:00 23:31 23:33


 


Temperature   


 


Pulse Rate 106 H 109 H 115 H


 


Pulse Rate [   





Bilateral]   


 


Respiratory 13 16 15





Rate   


 


Respiratory   





Rate [Bilateral   





]   


 


Blood Pressure 109/82 126/74 126/74


 


Blood Pressure   





[Right]   


 


O2 Sat by Pulse 95 97 





Oximetry   














  11/12/20 11/12/20





  00:01 00:08


 


Temperature 97.4 F L 


 


Pulse Rate 124 H 117 H


 


Pulse Rate [  





Bilateral]  


 


Respiratory 20 





Rate  


 


Respiratory  





Rate [Bilateral  





]  


 


Blood Pressure 138/96 


 


Blood Pressure  





[Right]  


 


O2 Sat by Pulse 96 





Oximetry  














- Reevaluation(s)


Reevaluation #1: 





11/10/20 19:20


Patient still with significant shortness of breath after ED treatment of 

bronchodilators and Solu-Medrol.  Remained saturation not 89 to 90%.  Patient 

placed on nasal count oxygen.  I discussed with patient the advanced nature of 

her cancer diagnosis and she is open to hospice discussion.  BiPAP requested for

 additional respiratory support











ED Medical Decision Making





- Lab Data


Result diagrams: 


                                 11/12/20 11:47





                                 11/12/20 11:47








                                   Lab Results











  11/10/20 11/10/20 11/10/20 Range/Units





  16:09 16:09 17:15 


 


WBC  16.0 H    (4.5-11.0)  K/mm3


 


RBC  4.72    (3.65-5.03)  M/mm3


 


Hgb  14.9 H    (10.1-14.3)  gm/dl


 


Hct  43.9 H    (30.3-42.9)  %


 


MCV  93    (79-97)  fl


 


MCH  32    (28-32)  pg


 


MCHC  34    (30-34)  %


 


RDW  13.8    (13.2-15.2)  %


 


Plt Count  359    (140-440)  K/mm3


 


Lymph % (Auto)  13.1 L    (13.4-35.0)  %


 


Mono % (Auto)  10.0 H    (0.0-7.3)  %


 


Eos % (Auto)  1.3    (0.0-4.3)  %


 


Baso % (Auto)  1.4    (0.0-1.8)  %


 


Lymph # (Auto)  2.1    (1.2-5.4)  K/mm3


 


Mono # (Auto)  1.6 H    (0.0-0.8)  K/mm3


 


Eos # (Auto)  0.2    (0.0-0.4)  K/mm3


 


Baso # (Auto)  0.2 H    (0.0-0.1)  K/mm3


 


Seg Neutrophils %  74.2 H    (40.0-70.0)  %


 


Seg Neutrophils #  11.9 H    (1.8-7.7)  K/mm3


 


ABG pH    7.458 H  (7.350-7.450)  pH Units


 


ABG pCO2    34.3  mm Hg


 


ABG pO2    70.4 L  (80.0-90.0)  mm Hg


 


ABG HCO3    23.7  (20.0-26.0)  mmol/L


 


ABG O2 Saturation    95.8  (95.0-99.0)  %


 


ABG O2 Content    19.0  (0.0-44)  


 


ABG Base Excess    0.4  (-2.0-3.0)  mmol/L


 


ABG Hemoglobin    14.5  (12.0-16.0)  gm/dl


 


ABG Carboxyhemoglobin    2.1  (0.0-5.0)  %


 


ABG Methemoglobin    0.5  (0.0-1.5)  %


 


Oxyhemoglobin    93.3 L  (95.0-99.0)  %


 


FiO2    21  %


 


Sodium   132 L   (137-145)  mmol/L


 


Potassium   3.5 L   (3.6-5.0)  mmol/L


 


Chloride   92.8 L   ()  mmol/L


 


Carbon Dioxide   26   (22-30)  mmol/L


 


Anion Gap   17   mmol/L


 


BUN   4 L   (7-17)  mg/dL


 


Creatinine   0.4 L   (0.6-1.2)  mg/dL


 


Estimated GFR   > 60   ml/min


 


BUN/Creatinine Ratio   10   %


 


Glucose   96   ()  mg/dL


 


Calcium   9.1   (8.4-10.2)  mg/dL


 


Total Bilirubin   0.40   (0.1-1.2)  mg/dL


 


AST   12   (5-40)  units/L


 


ALT   11   (7-56)  units/L


 


Alkaline Phosphatase   74   ()  units/L


 


Troponin T   < 0.010   (0.00-0.029)  ng/mL


 


Total Protein   6.5   (6.3-8.2)  g/dL


 


Albumin   3.5 L   (3.9-5)  g/dL


 


Albumin/Globulin Ratio   1.2   %














- EKG Data


-: EKG Interpreted by Me


EKG shows normal: sinus rhythm


Rate: tachycardia (118)





- Radiology Data


Radiology results: report reviewed





CHEST 2 VIEWS 





INDICATION: 


Dyspnea. 





COMPARISON: 


11/2/2020. 





FINDINGS: 


Support devices: None. 





Heart: Within normal limits. 


Lungs/Pleura: Persistent right upper lobe mass without significant change. No 

new infiltrate. No 


 significant pleural effusion. 





IMPRESSION: 


1. Stable right upper lobe mass. 


2. No new infiltrate. 





**ADDENDUM** 


 Complete obstruction of the right mainstem bronchus remains. 





Signer Name: Pilo Posada MD 


Signed: 11/10/2020 6:41 PM 


Workstation Name: [id number] 








 Addendum Transcribed By: ES 


 Addendum Dictated By: Pilo Posada MD 


 Addendum Electronically Authenticated By: Pilo Posada MD 


 Addendum Signed Date/Time: 11/10/20 1841 








 DD/DT: 11/10/20/1840 


 TD/TT: / 


 CT angio chest 





INDICATION / CLINICAL INFORMATION: 


stidor and right neck swelling, cancer mets. 





TECHNIQUE: 


Axial CT images were obtained after injection of Omnipaque 350, 100 cc IV 

contrast using CTA 


 protocol. 3 plane MIP / 3D reconstructions were produced. All CT scans at this 

location are 


 performed using CT dose reduction for ALARA by means of automated exposure 

control. 





COMPARISON: 


CTA chest 11/2/2020. 





FINDINGS: 


Please see CTA neck for discussion of the lower neck. 





A peripheral right upper lobe mass remains with maximal size of 3.7 cm. Previous

 size was 3.5. 


 Patchy nodularity is again seen at the right upper lobe medially. There is also

 extensive tumor at 


 the right hilum extending into the mediastinum. Parenchymal changes remain at 

the right middle lobe. 





Patchy nodularity is now seen at the right lower lobe into a lesser extent the 

left lower lobe. 


 Bronchi are partially filled with fluid. 





Extensive mediastinal adenopathy and tumor infiltration is unchanged. Metastatic

 involvement with 


 partial destruction involves the left fourth and seventh ribs and multiple 

thoracic vertebral 


 bodies. 





Negative for pulmonary embolus or pneumonia. Mild prominence of the ascending 

thoracic aorta is 


 unchanged. Narrowing of the central right pulmonary arteries due to tumor 

encasement is again noted.


 Narrowing of the IVC is seen centrally. 





A 4.4 cm right adrenal mass and 4.2 cm right hepatic lesion is again noted. 





IMPRESSION: 


1. Suspected primary right lung carcinoma with extensive metastatic involvement 

including the right


 hilum, multiple ribs, multiple vertebral bodies, liver and likely the right 

adrenal gland. 


2. Suspect mild pneumonia at the lung bases. 








 . CT neck w con 





INDICATION / CLINICAL INFORMATION: 


60 years Female; stidor and right neck swelling, cancer mets. 





TECHNIQUE: 


Contiguous thin cut axial images obtained through the neck following IV 

contrast. Sagittal and 


 coronal reconstructions performed by the technologist. All CT scans at this 

location are performed 


 using CT dose reduction for ALARA by means of automated exposure control. 





COMPARISON: 


None available. 





FINDINGS: 





MUCOSAL SPACE: The nasopharynx, oropharynx and vallecula, oral cavity and floor 

of mouth, 


 hypopharynx, and larynx are grossly normal. 





Small internal laryngoceles seen bilaterally - right more prominent than left. 





LYMPH NODES: Necrotic lymph node is seen in the right level 6 region, inferior 

and posterior to the


 thyroid gland. Abnormal appearing level 5 lymph nodes are seen bilaterally - 

left greater than 


 right. 





SALIVARY GLANDS: Parotid, submandibular, and visualized sublingual glands are 

within normal limits.








THYROID GLAND: Thyroid gland is markedly swollen with edematous areas, as well 

as periglandular 


 inflammation. Thyroiditis would be a consideration. There is mild edema 

extending into the larynx, 


 although the airway does not appear to be significantly compromised. 





PARANASAL SINUSES: Visualized paranasal sinuses and mastoid air cells are 

essentially clear. 





SPINE: No significant abnormality of the cervical spine appreciated. 





VASCULAR STRUCTURES: Vascular structures are grossly normal in appearance. 





ADDITIONAL FINDINGS: Destructive bone lesion is seen in the head of the clavicle

 on the left, along


 its anterior margin. 





Scarring type changes seen in the right lung apex. There may be a lung lesion in

 the right upper 


 lobe, which is partially visualized. This finding has somewhat of a spiculated 

type appearance. 





Poor dentition noted. 





IMPRESSION: 


1. Findings concerning for thyroiditis as described above. Mild laryngeal edema 

may be present. 


2. Adenopathy identified, as described above. Left clavicular lesion noted. 

Right upper lobe lung 


 lesion suspected. Findings are worrisome for neoplastic process with metastatic

 disease. Please 


 clinically correlate. 








- Medical Decision Making





PAF female presents to the hospital with respiratory distress/shortness of 

breath.  Treated with Solu-Medrol and bronchodilator with some mild improvement 

but symptoms persist.  BiPAP initiated for additional support.  Once again CTA 

shows significant metastatic cancer without pulmonary embolism.  Postobstructive

 process is noted.  Also findings of pneumonia noted CT of the neck shows 

swelling of the thyroid suspicious for thyroiditis without airway compromise.  

Blood cultures and thyroid studies pending at disposition.  Hospitalist to order

 appropriate antibiotics.  Patient received p.o. potassium hypokalemia








Critical Care Time: Yes


Critical care time in (mins) excluding proc time.: 35


Critical care attestation.: 


If time is entered above; I have spent that time in minutes in the direct care 

of this critically ill patient, excluding procedure time.








ED Disposition


Clinical Impression: 


 Dyspnea, Lung mass, Liver metastasis, Bone metastasis, Thyroiditis, Pneumonia





Disposition: [id number] OP ADMIT IP TO THIS HOSP


Is pt being admited?: Yes


Condition: Stable


Time of Disposition: 19:08 (Dr. Nuñez)

## 2020-11-11 RX ADMIN — FAMOTIDINE SCH MG: 10 INJECTION, SOLUTION INTRAVENOUS at 22:37

## 2020-11-11 RX ADMIN — HYDROMORPHONE HYDROCHLORIDE PRN MG: 1 INJECTION, SOLUTION INTRAMUSCULAR; INTRAVENOUS; SUBCUTANEOUS at 03:34

## 2020-11-11 RX ADMIN — Medication SCH ML: at 10:05

## 2020-11-11 RX ADMIN — HYDROMORPHONE HYDROCHLORIDE PRN MG: 1 INJECTION, SOLUTION INTRAMUSCULAR; INTRAVENOUS; SUBCUTANEOUS at 14:22

## 2020-11-11 RX ADMIN — IPRATROPIUM BROMIDE AND ALBUTEROL SULFATE SCH: .5; 3 SOLUTION RESPIRATORY (INHALATION) at 22:47

## 2020-11-11 RX ADMIN — FAMOTIDINE SCH MG: 10 INJECTION, SOLUTION INTRAVENOUS at 09:58

## 2020-11-11 RX ADMIN — METHYLPREDNISOLONE SODIUM SUCCINATE SCH MG: 125 INJECTION, POWDER, FOR SOLUTION INTRAMUSCULAR; INTRAVENOUS at 03:29

## 2020-11-11 RX ADMIN — HYDROMORPHONE HYDROCHLORIDE PRN MG: 1 INJECTION, SOLUTION INTRAMUSCULAR; INTRAVENOUS; SUBCUTANEOUS at 22:37

## 2020-11-11 RX ADMIN — METHYLPREDNISOLONE SODIUM SUCCINATE SCH MG: 125 INJECTION, POWDER, FOR SOLUTION INTRAMUSCULAR; INTRAVENOUS at 17:38

## 2020-11-11 RX ADMIN — IPRATROPIUM BROMIDE AND ALBUTEROL SULFATE SCH AMPUL: .5; 3 SOLUTION RESPIRATORY (INHALATION) at 13:02

## 2020-11-11 RX ADMIN — HYDROMORPHONE HYDROCHLORIDE PRN MG: 1 INJECTION, SOLUTION INTRAMUSCULAR; INTRAVENOUS; SUBCUTANEOUS at 17:38

## 2020-11-11 RX ADMIN — HYDROMORPHONE HYDROCHLORIDE PRN MG: 1 INJECTION, SOLUTION INTRAMUSCULAR; INTRAVENOUS; SUBCUTANEOUS at 09:57

## 2020-11-11 RX ADMIN — IPRATROPIUM BROMIDE AND ALBUTEROL SULFATE SCH AMPUL: .5; 3 SOLUTION RESPIRATORY (INHALATION) at 16:42

## 2020-11-11 RX ADMIN — METHYLPREDNISOLONE SODIUM SUCCINATE SCH MG: 125 INJECTION, POWDER, FOR SOLUTION INTRAMUSCULAR; INTRAVENOUS at 09:58

## 2020-11-11 RX ADMIN — IPRATROPIUM BROMIDE AND ALBUTEROL SULFATE SCH AMPUL: .5; 3 SOLUTION RESPIRATORY (INHALATION) at 08:19

## 2020-11-11 NOTE — PROGRESS NOTE
Assessment and Plan





Assessment and Plan


Advance Directives: Yes (full code)


Plan of care discussed with patient/family: Yes





- Patient Problems


(1) SIRS (systemic inflammatory response syndrome)


Current Visit: Yes   Status: Acute   


Plan to address problem: 


Patient has a high white count, tachypnea and tachycardia








(2) Pneumonia


Current Visit: Yes   Status: Acute   


Plan to address problem: 


Treat as community-acquired pneumonia


Postobstructive pneumonia unlikely








(3) Lung cancer metastatic to bone


Current Visit: Yes   Status: Acute   


Plan to address problem: 


Patient needs follow-up with oncology/radiation therapy for possible 

chemotherapy and radiation therapy


Patient counseled about outpatient follow-up








(4) COPD (chronic obstructive pulmonary disease)


Current Visit: Yes   Status: Chronic   


Plan to address problem: 


Patient on duo nebs and Solu-Medrol and antibiotics








(5) Hyponatremia


Current Visit: Yes   Status: Acute   


Plan to address problem: 


Should correct with IV fluids








(6) Hypokalemia


Current Visit: Yes   Status: Acute   


Plan to address problem: 


Supplemented








(7) DVT prophylaxis


Current Visit: Yes   Status: Acute   


Plan to address problem: 


On heparin and GI prophylaxis











Subjective


Date of service: 11/11/20


Principal diagnosis: Pneumonia and lung cancer with metastasis


Interval history: 





60-year-old female presents to the hospital complaining of progressively 

worsening dyspnea for the last 2 weeks.  Patient was seen here on November 2 for

similar symptoms and had a CT angiogram chest that was negative for pulmonary 

embolism however, shows a likely metastatic process.  There is a right upper 

lobe mass, lytic bony lesions of the thoracic cage, hepatic and renal masses, 

soft tissue density and lymph nodes in the mediastinum, stenosis of the right 

mainstem bronchus.  Patient did receive bronchodilators in the ED for wheezing. 

Hospitalist evaluated patient and discharged patient on Cipro due to possibility

of postobstructive pneumonia.  Patient completed Cipro as prescribed and states 

did help with her productive cough.  Last night she developed worsening 

shortness of breath unrelieved by her albuterol that was prescribed as an 

outpatient prior to ED evaluation.  Patient presents with possible stridor with 

wheeze on inspiration, tachypnea, and tachycardia.  No reports of fever.  She 

quit smoking 1 month ago.  He has not been able to follow-up as an outpatient 

since her ED visit.


11/11/2020


Patient still in the emergency room


Patient more comfortable


Patient downgraded from IMCU to medical floor telemetry

















Objective





- Constitutional


Vitals: 


                               Vital Signs - 12hr











  11/11/20 11/11/20 11/11/20





  04:00 04:30 04:55


 


Pulse Rate 100 H 102 H 105 H


 


Pulse Rate [   





Bilateral]   


 


Respiratory 13 14 17





Rate   


 


Respiratory   





Rate [Bilateral   





]   


 


Blood Pressure 113/77 126/77 126/77


 


O2 Sat by Pulse 95 95 97





Oximetry   














  11/11/20 11/11/20 11/11/20





  05:00 05:30 06:00


 


Pulse Rate 92 H 89 95 H


 


Pulse Rate [   





Bilateral]   


 


Respiratory 11 L 10 L 11 L





Rate   


 


Respiratory   





Rate [Bilateral   





]   


 


Blood Pressure 121/86 126/81 129/74


 


O2 Sat by Pulse  99 98





Oximetry   














  11/11/20 11/11/20 11/11/20





  06:30 07:00 08:00


 


Pulse Rate 92 H 93 H 94 H


 


Pulse Rate [   





Bilateral]   


 


Respiratory 12 12 13





Rate   


 


Respiratory   





Rate [Bilateral   





]   


 


Blood Pressure 130/77 132/78 123/81


 


O2 Sat by Pulse  97 97





Oximetry   














  11/11/20 11/11/20 11/11/20





  08:34 09:12 10:00


 


Pulse Rate   105 H


 


Pulse Rate [ 110 H  





Bilateral]   


 


Respiratory   18





Rate   


 


Respiratory 20  





Rate [Bilateral   





]   


 


Blood Pressure   119/76


 


O2 Sat by Pulse  95 97





Oximetry   














  11/11/20 11/11/20 11/11/20





  11:00 12:01 12:31


 


Pulse Rate 90  


 


Pulse Rate [   





Bilateral]   


 


Respiratory 13 20 





Rate   


 


Respiratory   





Rate [Bilateral   





]   


 


Blood Pressure 108/84 126/91 126/91


 


O2 Sat by Pulse 92 92 96





Oximetry   














  11/11/20 11/11/20 11/11/20





  13:01 13:43 14:00


 


Pulse Rate 98 H  103 H


 


Pulse Rate [  104 H 





Bilateral]   


 


Respiratory 13  29 H





Rate   


 


Respiratory  20 





Rate [Bilateral   





]   


 


Blood Pressure 128/83  111/70


 


O2 Sat by Pulse   95





Oximetry   











General appearance: Present: mild distress, well-nourished





- EENT


Eyes: PERRL, EOM intact


ENT: hearing intact, clear oral mucosa


Ears: bilateral: normal





- Neck


Neck: supple, normal ROM





- Respiratory


Respiratory effort: normal


Respiratory: bilateral: CTA





- Breasts


Breasts: normal





- Cardiovascular


Heart rate: 78


Rhythm: regular


Heart Sounds: Present: S1 & S2.  Absent: gallop, rub


Extremities: pulses intact, No edema, normal color, Full ROM





- Gastrointestinal


General gastrointestinal: Present: soft, non-tender, non-distended, normal bowel

sounds





- Genitourinary


Female genitourinary: normal





- Integumentary


Integumentary: clear, warm, dry





- Musculoskeletal


Musculoskeletal: 1, strength equal bilaterally





- Neurologic


Neurologic: moves all extremities





- Psychiatric


Psychiatric: memory intact, appropriate mood/affect, intact judgment & insight





- Labs


CBC & Chem 7: 


                                 11/10/20 16:09





                                 11/10/20 16:09


Labs: 


                              Abnormal lab results











  11/10/20 11/10/20 11/10/20 Range/Units





  16:09 16:09 17:15 


 


WBC  16.0 H    (4.5-11.0)  K/mm3


 


Hgb  14.9 H    (10.1-14.3)  gm/dl


 


Hct  43.9 H    (30.3-42.9)  %


 


Lymph % (Auto)  13.1 L    (13.4-35.0)  %


 


Mono % (Auto)  10.0 H    (0.0-7.3)  %


 


Mono # (Auto)  1.6 H    (0.0-0.8)  K/mm3


 


Baso # (Auto)  0.2 H    (0.0-0.1)  K/mm3


 


Seg Neutrophils %  74.2 H    (40.0-70.0)  %


 


Seg Neutrophils #  11.9 H    (1.8-7.7)  K/mm3


 


ABG pH    7.458 H  (7.350-7.450)  pH Units


 


ABG pO2    70.4 L  (80.0-90.0)  mm Hg


 


Oxyhemoglobin    93.3 L  (95.0-99.0)  %


 


Sodium   132 L   (137-145)  mmol/L


 


Potassium   3.5 L   (3.6-5.0)  mmol/L


 


Chloride   92.8 L   ()  mmol/L


 


BUN   4 L   (7-17)  mg/dL


 


Creatinine   0.4 L   (0.6-1.2)  mg/dL


 


Albumin   3.5 L   (3.9-5)  g/dL


 


Free T4     (0.76-1.46)  ng/dL














  11/10/20 Range/Units





  19:01 


 


WBC   (4.5-11.0)  K/mm3


 


Hgb   (10.1-14.3)  gm/dl


 


Hct   (30.3-42.9)  %


 


Lymph % (Auto)   (13.4-35.0)  %


 


Mono % (Auto)   (0.0-7.3)  %


 


Mono # (Auto)   (0.0-0.8)  K/mm3


 


Baso # (Auto)   (0.0-0.1)  K/mm3


 


Seg Neutrophils %   (40.0-70.0)  %


 


Seg Neutrophils #   (1.8-7.7)  K/mm3


 


ABG pH   (7.350-7.450)  pH Units


 


ABG pO2   (80.0-90.0)  mm Hg


 


Oxyhemoglobin   (95.0-99.0)  %


 


Sodium   (137-145)  mmol/L


 


Potassium   (3.6-5.0)  mmol/L


 


Chloride   ()  mmol/L


 


BUN   (7-17)  mg/dL


 


Creatinine   (0.6-1.2)  mg/dL


 


Albumin   (3.9-5)  g/dL


 


Free T4  1.66 H  (0.76-1.46)  ng/dL














HEART Score





- HEART Score


Troponin: 


                                        











Troponin T  < 0.010 ng/mL (0.00-0.029)   11/10/20  16:09

## 2020-11-12 LAB
BAND NEUTROPHILS # (MANUAL): 0.4 K/MM3
BUN SERPL-MCNC: 8 MG/DL (ref 7–17)
BUN/CREAT SERPL: 16 %
CALCIUM SERPL-MCNC: 9.1 MG/DL (ref 8.4–10.2)
HCT VFR BLD CALC: 39.4 % (ref 30.3–42.9)
HEMOLYSIS INDEX: 4
HGB BLD-MCNC: 12.9 GM/DL (ref 10.1–14.3)
MCHC RBC AUTO-ENTMCNC: 33 % (ref 30–34)
MCV RBC AUTO: 94 FL (ref 79–97)
MYELOCYTES # (MANUAL): 0 K/MM3
PLATELET # BLD: 393 K/MM3 (ref 140–440)
PROMYELOCYTES # (MANUAL): 0 K/MM3
RBC # BLD AUTO: 4.21 M/MM3 (ref 3.65–5.03)
TOTAL CELLS COUNTED BLD: 100

## 2020-11-12 RX ADMIN — Medication SCH ML: at 22:41

## 2020-11-12 RX ADMIN — Medication SCH ML: at 21:36

## 2020-11-12 RX ADMIN — IPRATROPIUM BROMIDE AND ALBUTEROL SULFATE SCH AMPUL: .5; 3 SOLUTION RESPIRATORY (INHALATION) at 15:09

## 2020-11-12 RX ADMIN — IPRATROPIUM BROMIDE AND ALBUTEROL SULFATE SCH: .5; 3 SOLUTION RESPIRATORY (INHALATION) at 17:11

## 2020-11-12 RX ADMIN — OXYCODONE AND ACETAMINOPHEN PRN TAB: 5; 325 TABLET ORAL at 21:37

## 2020-11-12 RX ADMIN — DEXTROSE AND SODIUM CHLORIDE SCH MLS/HR: 5; .9 INJECTION, SOLUTION INTRAVENOUS at 21:39

## 2020-11-12 RX ADMIN — METHYLPREDNISOLONE SODIUM SUCCINATE SCH MG: 125 INJECTION, POWDER, FOR SOLUTION INTRAMUSCULAR; INTRAVENOUS at 12:16

## 2020-11-12 RX ADMIN — IPRATROPIUM BROMIDE AND ALBUTEROL SULFATE SCH: .5; 3 SOLUTION RESPIRATORY (INHALATION) at 09:58

## 2020-11-12 RX ADMIN — METHYLPREDNISOLONE SODIUM SUCCINATE SCH MG: 125 INJECTION, POWDER, FOR SOLUTION INTRAMUSCULAR; INTRAVENOUS at 18:42

## 2020-11-12 RX ADMIN — FAMOTIDINE SCH MG: 10 INJECTION, SOLUTION INTRAVENOUS at 12:17

## 2020-11-12 RX ADMIN — Medication SCH ML: at 12:15

## 2020-11-12 RX ADMIN — HYDROMORPHONE HYDROCHLORIDE PRN MG: 1 INJECTION, SOLUTION INTRAMUSCULAR; INTRAVENOUS; SUBCUTANEOUS at 04:04

## 2020-11-12 RX ADMIN — HYDROMORPHONE HYDROCHLORIDE PRN MG: 1 INJECTION, SOLUTION INTRAMUSCULAR; INTRAVENOUS; SUBCUTANEOUS at 12:15

## 2020-11-12 RX ADMIN — DEXTROSE AND SODIUM CHLORIDE SCH MLS/HR: 5; .9 INJECTION, SOLUTION INTRAVENOUS at 00:46

## 2020-11-12 RX ADMIN — IPRATROPIUM BROMIDE AND ALBUTEROL SULFATE SCH AMPUL: .5; 3 SOLUTION RESPIRATORY (INHALATION) at 22:55

## 2020-11-12 RX ADMIN — HYDROMORPHONE HYDROCHLORIDE PRN MG: 1 INJECTION, SOLUTION INTRAMUSCULAR; INTRAVENOUS; SUBCUTANEOUS at 18:41

## 2020-11-12 RX ADMIN — FAMOTIDINE SCH MG: 10 INJECTION, SOLUTION INTRAVENOUS at 21:38

## 2020-11-12 RX ADMIN — Medication PRN ML: at 04:04

## 2020-11-12 RX ADMIN — DEXTROSE AND SODIUM CHLORIDE SCH MLS/HR: 5; .9 INJECTION, SOLUTION INTRAVENOUS at 12:19

## 2020-11-12 RX ADMIN — METHYLPREDNISOLONE SODIUM SUCCINATE SCH MG: 125 INJECTION, POWDER, FOR SOLUTION INTRAMUSCULAR; INTRAVENOUS at 04:09

## 2020-11-12 NOTE — PROGRESS NOTE
Assessment and Plan


Assessment and plan: 





Sepsis


Patient meets criteria with leukocytosis, tachypnea, tachycardia and diagnosis 

of pneumonia.  Follow-up blood cultures





Pneumonia


Treat as community-acquired pneumonia


Postobstructive pneumonia unlikely





Lung cancer metastatic to bone


Patient needs follow-up with oncology/radiation therapy for possible 

chemotherapy and radiation therapy


Patient counseled about outpatient follow-up.  CT scan revealed metastasis 

including the right hilum, multiple ribs, multiple vertebral bodies, liver and 

likely the right adrenal gland





COPD (chronic obstructive pulmonary disease)


Patient on duo nebs and Solu-Medrol and antibiotics





Hyponatremia


Should correct with IV fluids





Hypokalemia


Supplemented





DVT prophylaxis


On heparin and GI prophylaxis








History


Interval history: 





No new issues overnight.





Hospitalist Physical





- Constitutional


Vitals: 


                                        











Temp Pulse Resp BP Pulse Ox


 


 97.9 F   130 H  16   131/81   89 


 


 11/12/20 08:49  11/12/20 08:49  11/12/20 08:49  11/12/20 08:49  11/12/20 08:49











General appearance: Present: mild distress, well-nourished





- EENT


Eyes: Present: PERRL, EOM intact


ENT: hearing intact, clear oral mucosa, dentition normal





- Neck


Neck: Present: supple, normal ROM





- Respiratory


Respiratory effort: normal


Respiratory: bilateral: CTA





- Cardiovascular


Rhythm: regular


Heart Sounds: Present: S1 & S2.  Absent: gallop, rub





- Extremities


Extremities: no ischemia, No edema, Full ROM





- Abdominal


General gastrointestinal: soft, non-tender, non-distended, normal bowel sounds





- Integumentary


Integumentary: Present: clear, warm, dry





- Neurologic


Neurologic: CNII-XII intact, moves all extremities





HEART Score





- HEART Score


Troponin: 


                                        











Troponin T  < 0.010 ng/mL (0.00-0.029)   11/10/20  16:09    














Results





- Labs


CBC & Chem 7: 


                                 11/10/20 16:09





                                 11/10/20 16:09


Labs: 


                             Laboratory Last Values











WBC  16.0 K/mm3 (4.5-11.0)  H  11/10/20  16:09    


 


RBC  4.72 M/mm3 (3.65-5.03)   11/10/20  16:09    


 


Hgb  14.9 gm/dl (10.1-14.3)  H  11/10/20  16:09    


 


Hct  43.9 % (30.3-42.9)  H  11/10/20  16:09    


 


MCV  93 fl (79-97)   11/10/20  16:09    


 


MCH  32 pg (28-32)   11/10/20  16:09    


 


MCHC  34 % (30-34)   11/10/20  16:09    


 


RDW  13.8 % (13.2-15.2)   11/10/20  16:09    


 


Plt Count  359 K/mm3 (140-440)   11/10/20  16:09    


 


Lymph % (Auto)  13.1 % (13.4-35.0)  L  11/10/20  16:09    


 


Mono % (Auto)  10.0 % (0.0-7.3)  H  11/10/20  16:09    


 


Eos % (Auto)  1.3 % (0.0-4.3)   11/10/20  16:09    


 


Baso % (Auto)  1.4 % (0.0-1.8)   11/10/20  16:09    


 


Lymph # (Auto)  2.1 K/mm3 (1.2-5.4)   11/10/20  16:09    


 


Mono # (Auto)  1.6 K/mm3 (0.0-0.8)  H  11/10/20  16:09    


 


Eos # (Auto)  0.2 K/mm3 (0.0-0.4)   11/10/20  16:09    


 


Baso # (Auto)  0.2 K/mm3 (0.0-0.1)  H  11/10/20  16:09    


 


Seg Neutrophils %  74.2 % (40.0-70.0)  H  11/10/20  16:09    


 


Seg Neutrophils #  11.9 K/mm3 (1.8-7.7)  H  11/10/20  16:09    


 


ABG pH  7.458 pH Units (7.350-7.450)  H  11/10/20  17:15    


 


ABG pCO2  34.3 mm Hg  11/10/20  17:15    


 


ABG pO2  70.4 mm Hg (80.0-90.0)  L  11/10/20  17:15    


 


ABG HCO3  23.7 mmol/L (20.0-26.0)   11/10/20  17:15    


 


ABG O2 Saturation  95.8 % (95.0-99.0)   11/10/20  17:15    


 


ABG O2 Content  19.0  (0.0-44)   11/10/20  17:15    


 


ABG Base Excess  0.4 mmol/L (-2.0-3.0)   11/10/20  17:15    


 


ABG Hemoglobin  14.5 gm/dl (12.0-16.0)   11/10/20  17:15    


 


ABG Carboxyhemoglobin  2.1 % (0.0-5.0)   11/10/20  17:15    


 


ABG Methemoglobin  0.5 % (0.0-1.5)   11/10/20  17:15    


 


Oxyhemoglobin  93.3 % (95.0-99.0)  L  11/10/20  17:15    


 


FiO2  21 %  11/10/20  17:15    


 


Sodium  132 mmol/L (137-145)  L  11/10/20  16:09    


 


Potassium  3.5 mmol/L (3.6-5.0)  L  11/10/20  16:09    


 


Chloride  92.8 mmol/L ()  L  11/10/20  16:09    


 


Carbon Dioxide  26 mmol/L (22-30)   11/10/20  16:09    


 


Anion Gap  17 mmol/L  11/10/20  16:09    


 


BUN  4 mg/dL (7-17)  L  11/10/20  16:09    


 


Creatinine  0.4 mg/dL (0.6-1.2)  L  11/10/20  16:09    


 


Estimated GFR  > 60 ml/min  11/10/20  16:09    


 


BUN/Creatinine Ratio  10 %  11/10/20  16:09    


 


Glucose  96 mg/dL ()   11/10/20  16:09    


 


Calcium  9.1 mg/dL (8.4-10.2)   11/10/20  16:09    


 


Magnesium  2.00 mg/dL (1.7-2.3)   11/10/20  16:00    


 


Total Bilirubin  0.40 mg/dL (0.1-1.2)   11/10/20  16:09    


 


AST  12 units/L (5-40)   11/10/20  16:09    


 


ALT  11 units/L (7-56)   11/10/20  16:09    


 


Alkaline Phosphatase  74 units/L ()   11/10/20  16:09    


 


Troponin T  < 0.010 ng/mL (0.00-0.029)   11/10/20  16:09    


 


Total Protein  6.5 g/dL (6.3-8.2)   11/10/20  16:09    


 


Albumin  3.5 g/dL (3.9-5)  L  11/10/20  16:09    


 


Albumin/Globulin Ratio  1.2 %  11/10/20  16:09    


 


TSH  2.560 mlU/mL (0.270-4.200)   11/10/20  19:01    


 


Free T4  1.66 ng/dL (0.76-1.46)  H  11/10/20  19:01    











Microbiology: 


Microbiology





11/10/20 19:09   Peripheral/Venous   Blood Culture - Preliminary


                            NO GROWTH AFTER 24 HOURS


11/10/20 19:01   Peripheral/Venous   Blood Culture - Preliminary


                            NO GROWTH AFTER 24 HOURS








[place]/IV: 


                                        





Voiding Method                   Bedpan


IV Catheter Type [Left Forearm   INT / Saline Lock


]                                


IV Catheter Type [Right          INT / Saline Lock


Antecubital]                     











Active Medications





- Current Medications


Current Medications: 














Generic Name Dose Route Start Last Admin





  Trade Name Freq  PRN Reason Stop Dose Admin


 


Acetaminophen  650 mg  11/10/20 21:44 





  Tylenol  PO  





  Q4H PRN  





  Pain MILD(1-3)/Fever >100.5/HA  


 


Albuterol  2.5 mg  11/10/20 22:17 





  Proventil  IH  





  Q3HRT PRN  





  Wheezing  


 


Albuterol/Ipratropium  1 ampul  11/11/20 08:00  11/11/20 22:47





  Duoneb *Not For Prn Use*  IH   Not Given





  QIDRT TENA  


 


Famotidine  20 mg  11/10/20 22:00  11/11/20 22:37





  Pepcid  IV   20 mg





  BID TENA   Administration


 


Hydromorphone HCl  1 mg  11/10/20 21:44  11/12/20 04:04





  Dilaudid  IV   1 mg





  Q3H PRN   Administration





  Pain , Severe (7-10)  


 


Dextrose/Sodium Chloride  1,000 mls @ 100 mls/hr  11/10/20 22:00  11/12/20 00:46





  D5ns  IV   100 mls/hr





  AS DIRECT TENA   Administration


 


Levofloxacin/Dextrose  750 mg in 150 mls @ 100 mls/hr  11/10/20 22:00  11/11/20 

22:44





  Levaquin 750mg/150ml  IV   100 mls/hr





  Q24HR@2200 TENA   Administration





  Protocol  


 


Methylprednisolone Sodium Succinate  125 mg  11/11/20 02:00  11/12/20 04:09





  Solu-Medrol  IV   125 mg





  Q8H TENA   Administration


 


Metoclopramide HCl  10 mg  11/10/20 21:44 





  Reglan  IV  





  Q6H PRN  





  Nausea And Vomiting  


 


Ondansetron HCl  4 mg  11/10/20 21:44 





  Zofran  IV  





  Q3H PRN  





  Nausea And Vomiting  


 


Oxycodone/Acetaminophen  1 tab  11/10/20 21:44 





  Percocet 5/325  PO  





  Q6H PRN  





  Pain, Moderate (4-6)  


 


Sodium Chloride  10 ml  11/10/20 22:00  11/11/20 10:05





  Sodium Chloride Flush Syringe 10 Ml  IV   10 ml





  BID TENA   Administration


 


Sodium Chloride  10 ml  11/10/20 21:44  11/12/20 04:04





  Sodium Chloride Flush Syringe 10 Ml  IV   10 ml





  PRN PRN   Administration





  LINE FLUSH

## 2020-11-13 RX ADMIN — METHYLPREDNISOLONE SODIUM SUCCINATE SCH MG: 125 INJECTION, POWDER, FOR SOLUTION INTRAMUSCULAR; INTRAVENOUS at 03:41

## 2020-11-13 RX ADMIN — IPRATROPIUM BROMIDE AND ALBUTEROL SULFATE SCH AMPUL: .5; 3 SOLUTION RESPIRATORY (INHALATION) at 08:32

## 2020-11-13 RX ADMIN — Medication SCH ML: at 22:21

## 2020-11-13 RX ADMIN — METHYLPREDNISOLONE SODIUM SUCCINATE SCH MG: 125 INJECTION, POWDER, FOR SOLUTION INTRAMUSCULAR; INTRAVENOUS at 09:24

## 2020-11-13 RX ADMIN — IPRATROPIUM BROMIDE AND ALBUTEROL SULFATE SCH AMPUL: .5; 3 SOLUTION RESPIRATORY (INHALATION) at 12:43

## 2020-11-13 RX ADMIN — IPRATROPIUM BROMIDE AND ALBUTEROL SULFATE SCH AMPUL: .5; 3 SOLUTION RESPIRATORY (INHALATION) at 21:34

## 2020-11-13 RX ADMIN — OXYCODONE AND ACETAMINOPHEN PRN TAB: 5; 325 TABLET ORAL at 15:46

## 2020-11-13 RX ADMIN — HYDROMORPHONE HYDROCHLORIDE PRN MG: 1 INJECTION, SOLUTION INTRAMUSCULAR; INTRAVENOUS; SUBCUTANEOUS at 17:49

## 2020-11-13 RX ADMIN — FAMOTIDINE SCH MG: 10 INJECTION, SOLUTION INTRAVENOUS at 09:18

## 2020-11-13 RX ADMIN — FAMOTIDINE SCH MG: 20 TABLET ORAL at 22:21

## 2020-11-13 RX ADMIN — METHYLPREDNISOLONE SODIUM SUCCINATE SCH MG: 125 INJECTION, POWDER, FOR SOLUTION INTRAMUSCULAR; INTRAVENOUS at 17:48

## 2020-11-13 RX ADMIN — OXYCODONE AND ACETAMINOPHEN PRN TAB: 5; 325 TABLET ORAL at 22:21

## 2020-11-13 RX ADMIN — DEXTROSE AND SODIUM CHLORIDE SCH MLS/HR: 5; .9 INJECTION, SOLUTION INTRAVENOUS at 06:47

## 2020-11-13 RX ADMIN — HYDROMORPHONE HYDROCHLORIDE PRN MG: 1 INJECTION, SOLUTION INTRAMUSCULAR; INTRAVENOUS; SUBCUTANEOUS at 09:23

## 2020-11-13 RX ADMIN — IPRATROPIUM BROMIDE AND ALBUTEROL SULFATE SCH AMPUL: .5; 3 SOLUTION RESPIRATORY (INHALATION) at 16:39

## 2020-11-13 RX ADMIN — Medication SCH ML: at 09:30

## 2020-11-13 RX ADMIN — HYDROMORPHONE HYDROCHLORIDE PRN MG: 1 INJECTION, SOLUTION INTRAMUSCULAR; INTRAVENOUS; SUBCUTANEOUS at 00:30

## 2020-11-13 RX ADMIN — Medication PRN ML: at 00:35

## 2020-11-13 RX ADMIN — FAMOTIDINE SCH: 20 TABLET ORAL at 09:31

## 2020-11-13 NOTE — PROGRESS NOTE
Assessment and Plan


Assessment and plan: 





Sepsis


Patient meets criteria with leukocytosis, tachypnea, tachycardia and diagnosis 

of pneumonia.  Follow-up blood cultures





Pneumonia


Treat as community-acquired pneumonia


Postobstructive pneumonia unlikely





Lung cancer metastatic to bone


Patient needs follow-up with oncology/radiation therapy for possible 

chemotherapy and radiation therapy


Patient counseled about outpatient follow-up.  CT scan revealed metastasis 

including the right hilum, multiple ribs, multiple vertebral bodies, liver and 

likely the right adrenal gland





COPD (chronic obstructive pulmonary disease)


Patient on duo nebs and Solu-Medrol and antibiotics





Hyponatremia


Should correct with IV fluids





Hypokalemia


Supplemented





DVT prophylaxis


On heparin and GI prophylaxis





11/13/2020.  Patient reports dyspnea with minimal exertion.  I broached the 

issue of hospice but the patient would like to continue with aggressive care.  

Consult pulmonary for further evaluation.  Continue O2 to maintain sats greater 

than 92%.








History


Interval history: 





No new issues overnight.  Patient still with dyspnea on minimal exertion.





Hospitalist Physical





- Constitutional


Vitals: 


                                        











Temp Pulse Resp BP Pulse Ox


 


 97.4 F L  95 H  20   148/92   96 


 


 11/13/20 07:23  11/13/20 10:00  11/13/20 08:32  11/13/20 07:23  11/13/20 07:23











General appearance: Present: mild distress, well-nourished





- EENT


Eyes: Present: PERRL, EOM intact


ENT: hearing intact, clear oral mucosa, dentition normal





- Neck


Neck: Present: supple, normal ROM





- Respiratory


Respiratory effort: normal


Respiratory: bilateral: CTA





- Cardiovascular


Rhythm: regular


Heart Sounds: Present: S1 & S2.  Absent: gallop, rub





- Extremities


Extremities: no ischemia, No edema, Full ROM





- Abdominal


General gastrointestinal: soft, non-tender, non-distended, normal bowel sounds





- Integumentary


Integumentary: Present: clear, warm, dry





- Neurologic


Neurologic: CNII-XII intact, moves all extremities





HEART Score





- HEART Score


Troponin: 


                                        











Troponin T  < 0.010 ng/mL (0.00-0.029)   11/10/20  16:09    














Results





- Labs


CBC & Chem 7: 


                                 11/12/20 11:47





                                 11/12/20 11:47


Labs: 


                             Laboratory Last Values











WBC  21.9 K/mm3 (4.5-11.0)  H  11/12/20  11:47    


 


RBC  4.21 M/mm3 (3.65-5.03)   11/12/20  11:47    


 


Hgb  12.9 gm/dl (10.1-14.3)   11/12/20  11:47    


 


Hct  39.4 % (30.3-42.9)   11/12/20  11:47    


 


MCV  94 fl (79-97)   11/12/20  11:47    


 


MCH  31 pg (28-32)   11/12/20  11:47    


 


MCHC  33 % (30-34)   11/12/20  11:47    


 


RDW  13.6 % (13.2-15.2)   11/12/20  11:47    


 


Plt Count  393 K/mm3 (140-440)   11/12/20  11:47    


 


Lymph % (Auto)  13.1 % (13.4-35.0)  L  11/10/20  16:09    


 


Mono % (Auto)  10.0 % (0.0-7.3)  H  11/10/20  16:09    


 


Eos % (Auto)  1.3 % (0.0-4.3)   11/10/20  16:09    


 


Baso % (Auto)  1.4 % (0.0-1.8)   11/10/20  16:09    


 


Lymph # (Auto)  2.1 K/mm3 (1.2-5.4)   11/10/20  16:09    


 


Mono # (Auto)  1.6 K/mm3 (0.0-0.8)  H  11/10/20  16:09    


 


Eos # (Auto)  0.2 K/mm3 (0.0-0.4)   11/10/20  16:09    


 


Baso # (Auto)  0.2 K/mm3 (0.0-0.1)  H  11/10/20  16:09    


 


Add Manual Diff  Complete   11/12/20  11:47    


 


Total Counted  100   11/12/20  11:47    


 


Seg Neutrophils %  Np   11/12/20  11:47    


 


Seg Neuts % (Manual)  92.0 % (40.0-70.0)  H  11/12/20  11:47    


 


Band Neutrophils %  2.0 %  11/12/20  11:47    


 


Lymphocytes % (Manual)  2.0 % (13.4-35.0)  L  11/12/20  11:47    


 


Reactive Lymphs % (Man)  0 %  11/12/20  11:47    


 


Monocytes % (Manual)  4.0 % (0.0-7.3)   11/12/20  11:47    


 


Eosinophils % (Manual)  0 % (0.0-4.3)   11/12/20  11:47    


 


Basophils % (Manual)  0 % (0.0-1.8)   11/12/20  11:47    


 


Metamyelocytes %  0 %  11/12/20  11:47    


 


Myelocytes %  0 %  11/12/20  11:47    


 


Promyelocytes %  0 %  11/12/20  11:47    


 


Blast Cells %  0 %  11/12/20  11:47    


 


Nucleated RBC %  Not Reportable   11/12/20  11:47    


 


Seg Neutrophils #  11.9 K/mm3 (1.8-7.7)  H  11/10/20  16:09    


 


Seg Neutrophils # Man  20.1 K/mm3 (1.8-7.7)  H  11/12/20  11:47    


 


Band Neutrophils #  0.4 K/mm3  11/12/20  11:47    


 


Lymphocytes # (Manual)  0.4 K/mm3 (1.2-5.4)  L  11/12/20  11:47    


 


Abs React Lymphs (Man)  0.0 K/mm3  11/12/20  11:47    


 


Monocytes # (Manual)  0.9 K/mm3 (0.0-0.8)  H  11/12/20  11:47    


 


Eosinophils # (Manual)  0.0 K/mm3 (0.0-0.4)   11/12/20  11:47    


 


Basophils # (Manual)  0.0 K/mm3 (0.0-0.1)   11/12/20  11:47    


 


Metamyelocytes #  0.0 K/mm3  11/12/20  11:47    


 


Myelocytes #  0.0 K/mm3  11/12/20  11:47    


 


Promyelocytes #  0.0 K/mm3  11/12/20  11:47    


 


Blast Cells #  0.0 K/mm3  11/12/20  11:47    


 


WBC Morphology  Not Reportable   11/12/20  11:47    


 


Hypersegmented Neuts  Not Reportable   11/12/20  11:47    


 


Hyposegmented Neuts  Not Reportable   11/12/20  11:47    


 


Hypogranular Neuts  Not Reportable   11/12/20  11:47    


 


Smudge Cells  Not Reportable   11/12/20  11:47    


 


Toxic Granulation  Not Reportable   11/12/20  11:47    


 


Toxic Vacuolation  Not Reportable   11/12/20  11:47    


 


Dohle Bodies  Not Reportable   11/12/20  11:47    


 


Pelger-Huet Anomaly  Not Reportable   11/12/20  11:47    


 


Guerita Rods  Not Reportable   11/12/20  11:47    


 


Platelet Estimate  Consistent w auto   11/12/20  11:47    


 


Clumped Platelets  Not Reportable   11/12/20  11:47    


 


Plt Clumps, EDTA  Not Reportable   11/12/20  11:47    


 


Large Platelets  Not Reportable   11/12/20  11:47    


 


Giant Platelets  Not Reportable   11/12/20  11:47    


 


Platelet Satelliting  Not Reportable   11/12/20  11:47    


 


Plt Morphology Comment  Not Reportable   11/12/20  11:47    


 


RBC Morphology  Normal   11/12/20  11:47    


 


Dimorphic RBCs  Not Reportable   11/12/20  11:47    


 


Polychromasia  Not Reportable   11/12/20  11:47    


 


Hypochromasia  Not Reportable   11/12/20  11:47    


 


Poikilocytosis  Not Reportable   11/12/20  11:47    


 


Anisocytosis  Not Reportable   11/12/20  11:47    


 


Microcytosis  Not Reportable   11/12/20  11:47    


 


Macrocytosis  Not Reportable   11/12/20  11:47    


 


Spherocytes  Not Reportable   11/12/20  11:47    


 


Pappenheimer Bodies  Not Reportable   11/12/20  11:47    


 


Sickle Cells  Not Reportable   11/12/20  11:47    


 


Target Cells  Not Reportable   11/12/20  11:47    


 


Tear Drop Cells  Not Reportable   11/12/20  11:47    


 


Ovalocytes  Not Reportable   11/12/20  11:47    


 


Helmet Cells  Not Reportable   11/12/20  11:47    


 


Marie-Grandin Bodies  Not Reportable   11/12/20  11:47    


 


Cabot Rings  Not Reportable   11/12/20  11:47    


 


Mahogany Cells  Not Reportable   11/12/20  11:47    


 


Bite Cells  Not Reportable   11/12/20  11:47    


 


Crenated Cell  Not Reportable   11/12/20  11:47    


 


Elliptocytes  Not Reportable   11/12/20  11:47    


 


Acanthocytes (Spur)  Not Reportable   11/12/20  11:47    


 


Rouleaux  Not Reportable   11/12/20  11:47    


 


Hemoglobin C Crystals  Not Reportable   11/12/20  11:47    


 


Schistocytes  Not Reportable   11/12/20  11:47    


 


Malaria parasites  Not Reportable   11/12/20  11:47    


 


João Bodies  Not Reportable   11/12/20  11:47    


 


Hem Pathologist Commnt  No   11/12/20  11:47    


 


ABG pH  7.458 pH Units (7.350-7.450)  H  11/10/20  17:15    


 


ABG pCO2  34.3 mm Hg  11/10/20  17:15    


 


ABG pO2  70.4 mm Hg (80.0-90.0)  L  11/10/20  17:15    


 


ABG HCO3  23.7 mmol/L (20.0-26.0)   11/10/20  17:15    


 


ABG O2 Saturation  95.8 % (95.0-99.0)   11/10/20  17:15    


 


ABG O2 Content  19.0  (0.0-44)   11/10/20  17:15    


 


ABG Base Excess  0.4 mmol/L (-2.0-3.0)   11/10/20  17:15    


 


ABG Hemoglobin  14.5 gm/dl (12.0-16.0)   11/10/20  17:15    


 


ABG Carboxyhemoglobin  2.1 % (0.0-5.0)   11/10/20  17:15    


 


ABG Methemoglobin  0.5 % (0.0-1.5)   11/10/20  17:15    


 


Oxyhemoglobin  93.3 % (95.0-99.0)  L  11/10/20  17:15    


 


FiO2  21 %  11/10/20  17:15    


 


Sodium  136 mmol/L (137-145)  L  11/12/20  11:47    


 


Potassium  4.4 mmol/L (3.6-5.0)  D 11/12/20  11:47    


 


Chloride  98.2 mmol/L ()   11/12/20  11:47    


 


Carbon Dioxide  28 mmol/L (22-30)   11/12/20  11:47    


 


Anion Gap  14 mmol/L  11/12/20  11:47    


 


BUN  8 mg/dL (7-17)   11/12/20  11:47    


 


Creatinine  0.5 mg/dL (0.6-1.2)  L  11/12/20  11:47    


 


Estimated GFR  > 60 ml/min  11/12/20  11:47    


 


BUN/Creatinine Ratio  16 %  11/12/20  11:47    


 


Glucose  119 mg/dL ()  H  11/12/20  11:47    


 


Calcium  9.1 mg/dL (8.4-10.2)   11/12/20  11:47    


 


Magnesium  2.00 mg/dL (1.7-2.3)   11/10/20  16:00    


 


Total Bilirubin  0.40 mg/dL (0.1-1.2)   11/10/20  16:09    


 


AST  12 units/L (5-40)   11/10/20  16:09    


 


ALT  11 units/L (7-56)   11/10/20  16:09    


 


Alkaline Phosphatase  74 units/L ()   11/10/20  16:09    


 


Troponin T  < 0.010 ng/mL (0.00-0.029)   11/10/20  16:09    


 


Total Protein  6.5 g/dL (6.3-8.2)   11/10/20  16:09    


 


Albumin  3.5 g/dL (3.9-5)  L  11/10/20  16:09    


 


Albumin/Globulin Ratio  1.2 %  11/10/20  16:09    


 


TSH  2.560 mlU/mL (0.270-4.200)   11/10/20  19:01    


 


Free T4  1.66 ng/dL (0.76-1.46)  H  11/10/20  19:01    











Microbiology: 


Microbiology





11/10/20 19:09   Peripheral/Venous   Blood Culture - Preliminary


                            NO GROWTH AFTER 48 HOURS


11/10/20 19:01   Peripheral/Venous   Blood Culture - Preliminary


                            NO GROWTH AFTER 48 HOURS








Montilla/IV: 


                                        





Voiding Method                   Bedpan


IV Catheter Type [Left Forearm   INT / Saline Lock


]                                


IV Catheter Type [Right          INT / Saline Lock


Antecubital]                     











Active Medications





- Current Medications


Current Medications: 














Generic Name Dose Route Start Last Admin





  Trade Name Freq  PRN Reason Stop Dose Admin


 


Acetaminophen  650 mg  11/10/20 21:44 





  Tylenol  PO  





  Q4H PRN  





  Pain MILD(1-3)/Fever >100.5/HA  


 


Albuterol  2.5 mg  11/10/20 22:17 





  Proventil  IH  





  Q3HRT PRN  





  Wheezing  


 


Albuterol/Ipratropium  1 ampul  11/11/20 08:00  11/13/20 08:32





  Duoneb *Not For Prn Use*  IH   1 ampul





  QIDRT TENA   Administration


 


Famotidine  20 mg  11/13/20 10:00  11/13/20 09:31





  Pepcid  PO   Not Given





  BID TENA  


 


Hydromorphone HCl  1 mg  11/10/20 21:44  11/13/20 09:23





  Dilaudid  IV   1 mg





  Q3H PRN   Administration





  Pain , Severe (7-10)  


 


Dextrose/Sodium Chloride  1,000 mls @ 100 mls/hr  11/10/20 22:00  11/13/20 06:47





  D5ns  IV   100 mls/hr





  AS DIRECT TENA   Administration


 


Levofloxacin/Dextrose  750 mg in 150 mls @ 100 mls/hr  11/10/20 22:00  11/12/20 

21:38





  Levaquin 750mg/150ml  IV  11/14/20 23:29  100 mls/hr





  Q24HR@2200 TENA   Administration





  Protocol  


 


Methylprednisolone Sodium Succinate  60 mg  11/13/20 10:00  11/13/20 09:24





  Solu-Medrol  IV   60 mg





  Q8H TENA   Administration


 


Metoclopramide HCl  10 mg  11/10/20 21:44 





  Reglan  IV  





  Q6H PRN  





  Nausea And Vomiting  


 


Ondansetron HCl  4 mg  11/10/20 21:44 





  Zofran  IV  





  Q3H PRN  





  Nausea And Vomiting  


 


Oxycodone/Acetaminophen  1 tab  11/10/20 21:44  11/12/20 21:37





  Percocet 5/325  PO   1 tab





  Q6H PRN   Administration





  Pain, Moderate (4-6)  


 


Sodium Chloride  10 ml  11/10/20 22:00  11/13/20 09:30





  Sodium Chloride Flush Syringe 10 Ml  IV   10 ml





  BID TENA   Administration


 


Sodium Chloride  10 ml  11/10/20 21:44  11/13/20 00:35





  Sodium Chloride Flush Syringe 10 Ml  IV   10 ml





  PRN PRN   Administration





  LINE FLUSH  














Nutrition/Malnutrition Assess





- Dietary Evaluation


Nutrition/Malnutrition Findings: 


                                        





Nutrition Notes                                            Start:  11/12/20 

11:46


Freq:                                                      Status: Active       




Protocol:                                                                       




 Document     11/12/20 11:46  EN  (Rec: 11/12/20 11:58  EN  [id number])


 Co-Sign      11/12/20 11:46  LM


 Nutrition Notes


     Need for Assessment generated from:         RN Referral,MST


     Initial or Follow up                        Assessment


     Other Pertinent Diagnosis                   Pneu, Thyroiditis, bone


                                                 metastasis, liver metastasis,


                                                 lung mass, dyspnea


     Current Diet                                Regular


     Labs/Tests                                  11/10:


                                                 a 132, K+ 3.5, BUN 4, Cr 0.4


     Pertinent Medications                       D5ns at 100 ml/hr


     Height                                      5 ft 2 in


     Weight                                      50 kg


     Ideal Body Weight (kg)                      50.00


     BMI                                         20.1


     Intake Prior to Admission                   Poor


     Weight change and time frame                6% weight loss in 2 weeks


     Weight Status                               Appropriate


     Subjective/Other Information                RD consulted for MST score. Pt


                                                 reports weighing 119 pounds


                                                 at MD office on 10/2/2020 and


                                                 112 pounds at MD office on 10/


                                                 19/2020. Pt reports decreased


                                                 appetite both PTA and during


                                                 admission due to cancer. Pt


                                                 denies N/V/D. Pt states that


                                                 she typically eats one meal


                                                 per day due to decreased


                                                 appetite. Pt declines ONS


                                                 stating that she does not like


                                                 the taste of them. Pt would


                                                 like no eggs during meals and


                                                 prefers biscuits with gravy,


                                                 corn flakes, bananas, fruit


                                                 and salad with ranch dressing.


     Burn                                        Absent


     Trauma                                      Absent


     Food Allergy                                No


     Current % PO                                Poor (25-49%)


     Minimum of two criteria                     Yes


     Energy Intake (non-severe)                  <75% Estimated Energy


                                                 Requirement >7 days


     Interpretation of Weight Loss (severe)      >2% in 1 week


     #1


      Nutrition Diagnosis                        Malnutrition


      Etiology                                   cancer diagnosis


      As Evidenced by Signs and Symptoms         decreased appetite, 6% weight


                                                 loss in 2 weeks and consuming


                                                 only 1 meal per day


     Is patient on ventilator?                   No


     Is Patient Ambulatory and/or Out of Bed     Yes


     REE-([facility]-ambulatory/OOB) [     1343.225


      [email or web address]]                               


     Calculation Used for Recommendations        [facility]


     Additional Notes                            Protein: 1.2-1.5 g/kg (60-75g)


                                                 Fluid: 1 ml/kcal


 Nutrition Intervention


     Change Diet Order:                          Continue regular diet


     Goal #1                                     Meet 75% of energy and protein


                                                 needs


     Anticipated Discharge Needs:                Regular diet


     Follow-Up By:                               11/17/20


     Additional Comments                         F/u for intakes

## 2020-11-14 RX ADMIN — FAMOTIDINE SCH: 10 INJECTION, SOLUTION INTRAVENOUS at 20:08

## 2020-11-14 RX ADMIN — METHYLPREDNISOLONE SODIUM SUCCINATE SCH MG: 125 INJECTION, POWDER, FOR SOLUTION INTRAMUSCULAR; INTRAVENOUS at 02:11

## 2020-11-14 RX ADMIN — METHYLPREDNISOLONE SODIUM SUCCINATE SCH MG: 125 INJECTION, POWDER, FOR SOLUTION INTRAMUSCULAR; INTRAVENOUS at 10:38

## 2020-11-14 RX ADMIN — Medication SCH ML: at 22:58

## 2020-11-14 RX ADMIN — IPRATROPIUM BROMIDE AND ALBUTEROL SULFATE SCH: .5; 3 SOLUTION RESPIRATORY (INHALATION) at 15:34

## 2020-11-14 RX ADMIN — FAMOTIDINE SCH: 20 TABLET ORAL at 10:42

## 2020-11-14 RX ADMIN — IPRATROPIUM BROMIDE AND ALBUTEROL SULFATE SCH: .5; 3 SOLUTION RESPIRATORY (INHALATION) at 20:26

## 2020-11-14 RX ADMIN — IPRATROPIUM BROMIDE AND ALBUTEROL SULFATE SCH AMPUL: .5; 3 SOLUTION RESPIRATORY (INHALATION) at 11:46

## 2020-11-14 RX ADMIN — FAMOTIDINE SCH MG: 10 INJECTION, SOLUTION INTRAVENOUS at 22:57

## 2020-11-14 RX ADMIN — IPRATROPIUM BROMIDE AND ALBUTEROL SULFATE SCH AMPUL: .5; 3 SOLUTION RESPIRATORY (INHALATION) at 07:54

## 2020-11-14 RX ADMIN — METHYLPREDNISOLONE SODIUM SUCCINATE SCH: 125 INJECTION, POWDER, FOR SOLUTION INTRAMUSCULAR; INTRAVENOUS at 20:08

## 2020-11-14 RX ADMIN — Medication SCH: at 20:08

## 2020-11-14 NOTE — CONSULTATION
History of Present Illness


Consult date: 11/14/20


Reason for consult: dyspnea


History of present illness: 





story of present illness: 





60-year-old female presents to the hospital complaining of progressively 

worsening dyspnea for the last 2 weeks.  Patient was seen here on November 2 for

similar symptoms and had a CT angiogram chest that was negative for pulmonary 

embolism however, shows a likely metastatic process.  There is a right upper 

lobe mass, lytic bony lesions of the thoracic cage, hepatic and renal masses, 

soft tissue density and lymph nodes in the mediastinum, stenosis of the right 

mainstem bronchus.  Patient did receive bronchodilators in the ED for wheezing. 

Hospitalist evaluated patient and discharged patient on Cipro due to possibility

of postobstructive pneumonia.  Patient completed Cipro as prescribed and states 

did help with her productive cough.  Last night she developed worsening 

shortness of breath unrelieved by her albuterol that was prescribed as an 

outpatient prior to ED evaluation.  Patient presents with possible stridor with 

wheeze on inspiration, tachypnea, and tachycardia.  No reports of fever.  She 

quit smoking 1 month ago.  He has not been able to follow-up as an outpatient 

since her ED visit.


Patient reports only smoking 3 to 4 cigarettes a day for many years.  She denied

any history of COPD.  She does complain of dysphonia





Medications and Allergies


                                    Allergies











Allergy/AdvReac Type Severity Reaction Status Date / Time


 


No Known Allergies Allergy   Verified 11/10/20 22:14











                                Home Medications











 Medication  Instructions  Recorded  Confirmed  Last Taken  Type


 


Ciprofloxacin HCl [Ciprofloxacin 500 mg PO Q12HR #14 tab 11/02/20  Unknown Rx





TAB]     











Active Meds: 


Active Medications





Acetaminophen (Tylenol)  650 mg PO Q4H PRN


   PRN Reason: Pain MILD(1-3)/Fever >100.5/HA


Albuterol (Proventil)  2.5 mg IH Q3HRT PRN


   PRN Reason: Wheezing


Albuterol/Ipratropium (Duoneb *Not For Prn Use*)  1 ampul IH QIDRT [facility]


   Last Admin: 11/14/20 11:46 Dose:  1 ampul


   Documented by: 


Famotidine (Pepcid)  10 mg IV BID [facility]


Hydromorphone HCl (Dilaudid)  1 mg IV Q3H PRN


   PRN Reason: Pain , Severe (7-10)


   Last Admin: 11/13/20 17:49 Dose:  1 mg


   Documented by: 


Levofloxacin/Dextrose (Levaquin 750mg/150ml)  750 mg in 150 mls @ 100 mls/hr IV 

Q24HR@2200 [facility]; Protocol


   Stop: 11/14/20 23:29


   Last Admin: 11/13/20 22:21 Dose:  100 mls/hr


   Documented by: 


Methylprednisolone Sodium Succinate (Solu-Medrol)  60 mg IV Q8H [facility]


   Last Admin: 11/14/20 10:38 Dose:  60 mg


   Documented by: 


Metoclopramide HCl (Reglan)  10 mg IV Q6H PRN


   PRN Reason: Nausea And Vomiting


Ondansetron HCl (Zofran)  4 mg IV Q3H PRN


   PRN Reason: Nausea And Vomiting


Oxycodone/Acetaminophen (Percocet 5/325)  1 tab PO Q6H PRN


   PRN Reason: Pain, Moderate (4-6)


   Last Admin: 11/13/20 22:21 Dose:  1 tab


   Documented by: 


Sodium Chloride (Sodium Chloride Flush Syringe 10 Ml)  10 ml IV BID [facility]


   Last Admin: 11/13/20 22:21 Dose:  10 ml


   Documented by: 


Sodium Chloride (Sodium Chloride Flush Syringe 10 Ml)  10 ml IV PRN PRN


   PRN Reason: LINE FLUSH


   Last Admin: 11/13/20 00:35 Dose:  10 ml


   Documented by: 











Review of Systems


Constitutional: weight loss, weakness


Cardiovascular: dyspnea on exertion


Respiratory: cough, cough with sputum, shortness of breath, dyspnea on exertion





Physical Examination


Vital signs: 


                                   Vital Signs











Temp Pulse Resp BP Pulse Ox


 


 97.4 F L  129 H  24   106/68   90 


 


 11/10/20 15:39  11/10/20 15:39  11/10/20 15:39  11/10/20 15:39  11/10/20 15:39











General appearance: no acute distress


Eyes: non-icteric


ENT: oropharynx moist


Neck: supple, no JVD


Effort: normal


Ascultation: Bilateral: diminished breath sounds


Cardiovascular: regular rate and rhythm


Gastrointestinal: normoactive bowel sounds, soft, non-tender


Integumentary: normal


Extremities: no cyanosis, no edema, pink and warm


Musculoskeletal: no deformities


Gait: normal gait


normal mental status, non-focal exam


mood appropriate





Results





- Laboratory Findings


CBC and BMP: 


                                 11/12/20 11:47





                                 11/12/20 11:47


ABG











ABG pH  7.458 pH Units (7.350-7.450)  H  11/10/20  17:15    


 


ABG pCO2  34.3 mm Hg  11/10/20  17:15    


 


ABG pO2  70.4 mm Hg (80.0-90.0)  L  11/10/20  17:15    


 


ABG O2 Saturation  95.8 % (95.0-99.0)   11/10/20  17:15    








Abnormal lab findings: 


                                  Abnormal Labs











  11/10/20 11/10/20 11/10/20





  16:09 16:09 17:15


 


WBC  16.0 H  


 


Hgb  14.9 H  


 


Hct  43.9 H  


 


Lymph % (Auto)  13.1 L  


 


Mono % (Auto)  10.0 H  


 


Mono # (Auto)  1.6 H  


 


Baso # (Auto)  0.2 H  


 


Seg Neutrophils %  74.2 H  


 


Seg Neuts % (Manual)   


 


Lymphocytes % (Manual)   


 


Seg Neutrophils #  11.9 H  


 


Seg Neutrophils # Man   


 


Lymphocytes # (Manual)   


 


Monocytes # (Manual)   


 


ABG pH    7.458 H


 


ABG pO2    70.4 L


 


Oxyhemoglobin    93.3 L


 


Sodium   132 L 


 


Potassium   3.5 L 


 


Chloride   92.8 L 


 


BUN   4 L 


 


Creatinine   0.4 L 


 


Glucose   


 


Albumin   3.5 L 


 


Free T4   














  11/10/20 11/12/20 11/12/20





  19:01 11:47 11:47


 


WBC   21.9 H 


 


Hgb   


 


Hct   


 


Lymph % (Auto)   


 


Mono % (Auto)   


 


Mono # (Auto)   


 


Baso # (Auto)   


 


Seg Neutrophils %   


 


Seg Neuts % (Manual)   92.0 H 


 


Lymphocytes % (Manual)   2.0 L 


 


Seg Neutrophils #   


 


Seg Neutrophils # Man   20.1 H 


 


Lymphocytes # (Manual)   0.4 L 


 


Monocytes # (Manual)   0.9 H 


 


ABG pH   


 


ABG pO2   


 


Oxyhemoglobin   


 


Sodium    136 L


 


Potassium   


 


Chloride   


 


BUN   


 


Creatinine    0.5 L


 


Glucose    119 H


 


Albumin   


 


Free T4  1.66 H  














- Diagnostic Findings


CT scan - chest: report reviewed (Right upper lobe lung mass with widespread 

metastatic disease)





Assessment and Plan





Impression:


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

ogenic carcinoma


Shortness of breath multifactorial related to above plus possible underlying 

COPD


Recommendation:


Continue with the bronchodilator therapy.


A CT-guided needle biopsy of the right upper lobe mass.


May require bronchoscopy


Continue with the current regimen.

## 2020-11-14 NOTE — PROGRESS NOTE
Assessment and Plan


Assessment and plan: 





Sepsis


Patient meets criteria with leukocytosis, tachypnea, tachycardia and diagnosis 

of pneumonia.  Follow-up blood cultures





Pneumonia


Treat as community-acquired pneumonia


Postobstructive pneumonia unlikely





Lung cancer metastatic to bone


Patient needs follow-up with oncology/radiation therapy for possible 

chemotherapy and radiation therapy


Patient counseled about outpatient follow-up.  CT scan revealed metastasis 

including the right hilum, multiple ribs, multiple vertebral bodies, liver and 

likely the right adrenal gland





COPD (chronic obstructive pulmonary disease)


Patient on duo nebs and Solu-Medrol and antibiotics





Hyponatremia


Should correct with IV fluids





Hypokalemia


Supplemented





DVT prophylaxis


On heparin and GI prophylaxis





11/13/2020.  Patient reports dyspnea with minimal exertion.  I broached the 

issue of hospice but the patient would like to continue with aggressive care.  

Consult pulmonary for further evaluation.  Continue O2 to maintain sats greater 

than 92%.





11/14/2020.  Continue IV antibiotics.  Await pulmonary evaluation.  Anticipate 

discharge in a.m. if stable








History


Interval history: 





No new issues overnight.  Patient still with dyspnea on minimal exertion.





Hospitalist Physical





- Constitutional


Vitals: 


                                        











Temp Pulse Resp BP Pulse Ox


 


 98.9 F   118 H  16   166/96   93 


 


 11/14/20 08:55  11/14/20 08:55  11/14/20 08:55  11/14/20 08:55  11/14/20 08:55











General appearance: Present: mild distress, well-nourished





- EENT


Eyes: Present: PERRL, EOM intact


ENT: hearing intact, clear oral mucosa, dentition normal





- Neck


Neck: Present: supple, normal ROM





- Respiratory


Respiratory effort: normal


Respiratory: bilateral: CTA





- Cardiovascular


Rhythm: regular


Heart Sounds: Present: S1 & S2.  Absent: gallop, rub





- Extremities


Extremities: no ischemia, No edema, Full ROM





- Abdominal


General gastrointestinal: soft, non-tender, non-distended, normal bowel sounds





- Integumentary


Integumentary: Present: clear, warm, dry





- Neurologic


Neurologic: CNII-XII intact, moves all extremities





HEART Score





- HEART Score


Troponin: 


                                        











Troponin T  < 0.010 ng/mL (0.00-0.029)   11/10/20  16:09    














Results





- Labs


CBC & Chem 7: 


                                 11/12/20 11:47





                                 11/12/20 11:47


Labs: 


                             Laboratory Last Values











WBC  21.9 K/mm3 (4.5-11.0)  H  11/12/20  11:47    


 


RBC  4.21 M/mm3 (3.65-5.03)   11/12/20  11:47    


 


Hgb  12.9 gm/dl (10.1-14.3)   11/12/20  11:47    


 


Hct  39.4 % (30.3-42.9)   11/12/20  11:47    


 


MCV  94 fl (79-97)   11/12/20  11:47    


 


MCH  31 pg (28-32)   11/12/20  11:47    


 


MCHC  33 % (30-34)   11/12/20  11:47    


 


RDW  13.6 % (13.2-15.2)   11/12/20  11:47    


 


Plt Count  393 K/mm3 (140-440)   11/12/20  11:47    


 


Lymph % (Auto)  13.1 % (13.4-35.0)  L  11/10/20  16:09    


 


Mono % (Auto)  10.0 % (0.0-7.3)  H  11/10/20  16:09    


 


Eos % (Auto)  1.3 % (0.0-4.3)   11/10/20  16:09    


 


Baso % (Auto)  1.4 % (0.0-1.8)   11/10/20  16:09    


 


Lymph # (Auto)  2.1 K/mm3 (1.2-5.4)   11/10/20  16:09    


 


Mono # (Auto)  1.6 K/mm3 (0.0-0.8)  H  11/10/20  16:09    


 


Eos # (Auto)  0.2 K/mm3 (0.0-0.4)   11/10/20  16:09    


 


Baso # (Auto)  0.2 K/mm3 (0.0-0.1)  H  11/10/20  16:09    


 


Add Manual Diff  Complete   11/12/20  11:47    


 


Total Counted  100   11/12/20  11:47    


 


Seg Neutrophils %  Np   11/12/20  11:47    


 


Seg Neuts % (Manual)  92.0 % (40.0-70.0)  H  11/12/20  11:47    


 


Band Neutrophils %  2.0 %  11/12/20  11:47    


 


Lymphocytes % (Manual)  2.0 % (13.4-35.0)  L  11/12/20  11:47    


 


Reactive Lymphs % (Man)  0 %  11/12/20  11:47    


 


Monocytes % (Manual)  4.0 % (0.0-7.3)   11/12/20  11:47    


 


Eosinophils % (Manual)  0 % (0.0-4.3)   11/12/20  11:47    


 


Basophils % (Manual)  0 % (0.0-1.8)   11/12/20  11:47    


 


Metamyelocytes %  0 %  11/12/20  11:47    


 


Myelocytes %  0 %  11/12/20  11:47    


 


Promyelocytes %  0 %  11/12/20  11:47    


 


Blast Cells %  0 %  11/12/20  11:47    


 


Nucleated RBC %  Not Reportable   11/12/20  11:47    


 


Seg Neutrophils #  11.9 K/mm3 (1.8-7.7)  H  11/10/20  16:09    


 


Seg Neutrophils # Man  20.1 K/mm3 (1.8-7.7)  H  11/12/20  11:47    


 


Band Neutrophils #  0.4 K/mm3  11/12/20  11:47    


 


Lymphocytes # (Manual)  0.4 K/mm3 (1.2-5.4)  L  11/12/20  11:47    


 


Abs React Lymphs (Man)  0.0 K/mm3  11/12/20  11:47    


 


Monocytes # (Manual)  0.9 K/mm3 (0.0-0.8)  H  11/12/20  11:47    


 


Eosinophils # (Manual)  0.0 K/mm3 (0.0-0.4)   11/12/20  11:47    


 


Basophils # (Manual)  0.0 K/mm3 (0.0-0.1)   11/12/20  11:47    


 


Metamyelocytes #  0.0 K/mm3  11/12/20  11:47    


 


Myelocytes #  0.0 K/mm3  11/12/20  11:47    


 


Promyelocytes #  0.0 K/mm3  11/12/20  11:47    


 


Blast Cells #  0.0 K/mm3  11/12/20  11:47    


 


WBC Morphology  Not Reportable   11/12/20  11:47    


 


Hypersegmented Neuts  Not Reportable   11/12/20  11:47    


 


Hyposegmented Neuts  Not Reportable   11/12/20  11:47    


 


Hypogranular Neuts  Not Reportable   11/12/20  11:47    


 


Smudge Cells  Not Reportable   11/12/20  11:47    


 


Toxic Granulation  Not Reportable   11/12/20  11:47    


 


Toxic Vacuolation  Not Reportable   11/12/20  11:47    


 


Dohle Bodies  Not Reportable   11/12/20  11:47    


 


Pelger-Huet Anomaly  Not Reportable   11/12/20  11:47    


 


Guerita Rods  Not Reportable   11/12/20  11:47    


 


Platelet Estimate  Consistent w auto   11/12/20  11:47    


 


Clumped Platelets  Not Reportable   11/12/20  11:47    


 


Plt Clumps, EDTA  Not Reportable   11/12/20  11:47    


 


Large Platelets  Not Reportable   11/12/20  11:47    


 


Giant Platelets  Not Reportable   11/12/20  11:47    


 


Platelet Satelliting  Not Reportable   11/12/20  11:47    


 


Plt Morphology Comment  Not Reportable   11/12/20  11:47    


 


RBC Morphology  Normal   11/12/20  11:47    


 


Dimorphic RBCs  Not Reportable   11/12/20  11:47    


 


Polychromasia  Not Reportable   11/12/20  11:47    


 


Hypochromasia  Not Reportable   11/12/20  11:47    


 


Poikilocytosis  Not Reportable   11/12/20  11:47    


 


Anisocytosis  Not Reportable   11/12/20  11:47    


 


Microcytosis  Not Reportable   11/12/20  11:47    


 


Macrocytosis  Not Reportable   11/12/20  11:47    


 


Spherocytes  Not Reportable   11/12/20  11:47    


 


Pappenheimer Bodies  Not Reportable   11/12/20  11:47    


 


Sickle Cells  Not Reportable   11/12/20  11:47    


 


Target Cells  Not Reportable   11/12/20  11:47    


 


Tear Drop Cells  Not Reportable   11/12/20  11:47    


 


Ovalocytes  Not Reportable   11/12/20  11:47    


 


Helmet Cells  Not Reportable   11/12/20  11:47    


 


Marie-Leander Bodies  Not Reportable   11/12/20  11:47    


 


Cabot Rings  Not Reportable   11/12/20  11:47    


 


Sutton Cells  Not Reportable   11/12/20  11:47    


 


Bite Cells  Not Reportable   11/12/20  11:47    


 


Crenated Cell  Not Reportable   11/12/20  11:47    


 


Elliptocytes  Not Reportable   11/12/20  11:47    


 


Acanthocytes (Spur)  Not Reportable   11/12/20  11:47    


 


Rouleaux  Not Reportable   11/12/20  11:47    


 


Hemoglobin C Crystals  Not Reportable   11/12/20  11:47    


 


Schistocytes  Not Reportable   11/12/20  11:47    


 


Malaria parasites  Not Reportable   11/12/20  11:47    


 


João Bodies  Not Reportable   11/12/20  11:47    


 


Hem Pathologist Commnt  No   11/12/20  11:47    


 


ABG pH  7.458 pH Units (7.350-7.450)  H  11/10/20  17:15    


 


ABG pCO2  34.3 mm Hg  11/10/20  17:15    


 


ABG pO2  70.4 mm Hg (80.0-90.0)  L  11/10/20  17:15    


 


ABG HCO3  23.7 mmol/L (20.0-26.0)   11/10/20  17:15    


 


ABG O2 Saturation  95.8 % (95.0-99.0)   11/10/20  17:15    


 


ABG O2 Content  19.0  (0.0-44)   11/10/20  17:15    


 


ABG Base Excess  0.4 mmol/L (-2.0-3.0)   11/10/20  17:15    


 


ABG Hemoglobin  14.5 gm/dl (12.0-16.0)   11/10/20  17:15    


 


ABG Carboxyhemoglobin  2.1 % (0.0-5.0)   11/10/20  17:15    


 


ABG Methemoglobin  0.5 % (0.0-1.5)   11/10/20  17:15    


 


Oxyhemoglobin  93.3 % (95.0-99.0)  L  11/10/20  17:15    


 


FiO2  21 %  11/10/20  17:15    


 


Sodium  136 mmol/L (137-145)  L  11/12/20  11:47    


 


Potassium  4.4 mmol/L (3.6-5.0)  D 11/12/20  11:47    


 


Chloride  98.2 mmol/L ()   11/12/20  11:47    


 


Carbon Dioxide  28 mmol/L (22-30)   11/12/20  11:47    


 


Anion Gap  14 mmol/L  11/12/20  11:47    


 


BUN  8 mg/dL (7-17)   11/12/20  11:47    


 


Creatinine  0.5 mg/dL (0.6-1.2)  L  11/12/20  11:47    


 


Estimated GFR  > 60 ml/min  11/12/20  11:47    


 


BUN/Creatinine Ratio  16 %  11/12/20  11:47    


 


Glucose  119 mg/dL ()  H  11/12/20  11:47    


 


Calcium  9.1 mg/dL (8.4-10.2)   11/12/20  11:47    


 


Magnesium  2.00 mg/dL (1.7-2.3)   11/10/20  16:00    


 


Total Bilirubin  0.40 mg/dL (0.1-1.2)   11/10/20  16:09    


 


AST  12 units/L (5-40)   11/10/20  16:09    


 


ALT  11 units/L (7-56)   11/10/20  16:09    


 


Alkaline Phosphatase  74 units/L ()   11/10/20  16:09    


 


Troponin T  < 0.010 ng/mL (0.00-0.029)   11/10/20  16:09    


 


Total Protein  6.5 g/dL (6.3-8.2)   11/10/20  16:09    


 


Albumin  3.5 g/dL (3.9-5)  L  11/10/20  16:09    


 


Albumin/Globulin Ratio  1.2 %  11/10/20  16:09    


 


TSH  2.560 mlU/mL (0.270-4.200)   11/10/20  19:01    


 


Free T4  1.66 ng/dL (0.76-1.46)  H  11/10/20  19:01    











Microbiology: 


Microbiology





11/10/20 19:09   Peripheral/Venous   Blood Culture - Preliminary


                            NO GROWTH AFTER 72 HOURS


11/10/20 19:01   Peripheral/Venous   Blood Culture - Preliminary


                            NO GROWTH AFTER 72 HOURS








Montilla/IV: 


                                        





Voiding Method                   Toilet


IV Catheter Type [Left Forearm   INT / Saline Lock


]                                


IV Catheter Type [Right          INT / Saline Lock


Antecubital]                     











Active Medications





- Current Medications


Current Medications: 














Generic Name Dose Route Start Last Admin





  Trade Name Freq  PRN Reason Stop Dose Admin


 


Acetaminophen  650 mg  11/10/20 21:44 





  Tylenol  PO  





  Q4H PRN  





  Pain MILD(1-3)/Fever >100.5/HA  


 


Albuterol  2.5 mg  11/10/20 22:17 





  Proventil  IH  





  Q3HRT PRN  





  Wheezing  


 


Albuterol/Ipratropium  1 ampul  11/11/20 08:00  11/14/20 07:54





  Duoneb *Not For Prn Use*  IH   1 ampul





  QIDRT TENA   Administration


 


Famotidine  20 mg  11/13/20 10:00  11/14/20 10:42





  Pepcid  PO   Not Given





  BID TENA  


 


Hydromorphone HCl  1 mg  11/10/20 21:44  11/13/20 17:49





  Dilaudid  IV   1 mg





  Q3H PRN   Administration





  Pain , Severe (7-10)  


 


Levofloxacin/Dextrose  750 mg in 150 mls @ 100 mls/hr  11/10/20 22:00  11/13/20 

22:21





  Levaquin 750mg/150ml  IV  11/14/20 23:29  100 mls/hr





  Q24HR@2200 TENA   Administration





  Protocol  


 


Methylprednisolone Sodium Succinate  60 mg  11/13/20 10:00  11/14/20 10:38





  Solu-Medrol  IV   60 mg





  Q8H TENA   Administration


 


Metoclopramide HCl  10 mg  11/10/20 21:44 





  Reglan  IV  





  Q6H PRN  





  Nausea And Vomiting  


 


Ondansetron HCl  4 mg  11/10/20 21:44 





  Zofran  IV  





  Q3H PRN  





  Nausea And Vomiting  


 


Oxycodone/Acetaminophen  1 tab  11/10/20 21:44  11/13/20 22:21





  Percocet 5/325  PO   1 tab





  Q6H PRN   Administration





  Pain, Moderate (4-6)  


 


Sodium Chloride  10 ml  11/10/20 22:00  11/13/20 22:21





  Sodium Chloride Flush Syringe 10 Ml  IV   10 ml





  BID TENA   Administration


 


Sodium Chloride  10 ml  11/10/20 21:44  11/13/20 00:35





  Sodium Chloride Flush Syringe 10 Ml  IV   10 ml





  PRN PRN   Administration





  LINE FLUSH  














Nutrition/Malnutrition Assess





- Dietary Evaluation


Nutrition/Malnutrition Findings: 


                                        





Nutrition Notes                                            Start:  11/12/20 

11:46


Freq:                                                      Status: Active       




Protocol:                                                                       




 Document     11/12/20 11:46  EN  (Rec: 11/12/20 11:58  EN  [id number])


 Co-Sign      11/12/20 11:46  LM


 Nutrition Notes


     Need for Assessment generated from:         RN Referral,MST


     Initial or Follow up                        Assessment


     Other Pertinent Diagnosis                   Pneu, Thyroiditis, bone


                                                 metastasis, liver metastasis,


                                                 lung mass, dyspnea


     Current Diet                                Regular


     Labs/Tests                                  11/10:


                                                 a 132, K+ 3.5, BUN 4, Cr 0.4


     Pertinent Medications                       D5ns at 100 ml/hr


     Height                                      5 ft 2 in


     Weight                                      50 kg


     Ideal Body Weight (kg)                      50.00


     BMI                                         20.1


     Intake Prior to Admission                   Poor


     Weight change and time frame                6% weight loss in 2 weeks


     Weight Status                               Appropriate


     Subjective/Other Information                RD consulted for MST score. Pt


                                                 reports weighing 119 pounds


                                                 at MD office on 10/2/2020 and


                                                 112 pounds at MD office on 10/


                                                 19/2020. Pt reports decreased


                                                 appetite both PTA and during


                                                 admission due to cancer. Pt


                                                 denies N/V/D. Pt states that


                                                 she typically eats one meal


                                                 per day due to decreased


                                                 appetite. Pt declines ONS


                                                 stating that she does not like


                                                 the taste of them. Pt would


                                                 like no eggs during meals and


                                                 prefers biscuits with gravy,


                                                 corn flakes, bananas, fruit


                                                 and salad with ranch dressing.


     Burn                                        Absent


     Trauma                                      Absent


     Food Allergy                                No


     Current % PO                                Poor (25-49%)


     Minimum of two criteria                     Yes


     Energy Intake (non-severe)                  <75% Estimated Energy


                                                 Requirement >7 days


     Interpretation of Weight Loss (severe)      >2% in 1 week


     #1


      Nutrition Diagnosis                        Malnutrition


      Etiology                                   cancer diagnosis


      As Evidenced by Signs and Symptoms         decreased appetite, 6% weight


                                                 loss in 2 weeks and consuming


                                                 only 1 meal per day


     Is patient on ventilator?                   No


     Is Patient Ambulatory and/or Out of Bed     Yes


     REE-([facility]-ambulatory/OOB) [     1343.225


      [email or web address]]                               


     Calculation Used for Recommendations        [facility]


     Additional Notes                            Protein: 1.2-1.5 g/kg (60-75g)


                                                 Fluid: 1 ml/kcal


 Nutrition Intervention


     Change Diet Order:                          Continue regular diet


     Goal #1                                     Meet 75% of energy and protein


                                                 needs


     Anticipated Discharge Needs:                Regular diet


     Follow-Up By:                               11/17/20


     Additional Comments                         F/u for intakes

## 2020-11-15 LAB
BASOPHILS # (AUTO): 0 K/MM3 (ref 0–0.1)
BASOPHILS NFR BLD AUTO: 0.1 % (ref 0–1.8)
BUN SERPL-MCNC: 11 MG/DL (ref 7–17)
BUN/CREAT SERPL: 28 %
CALCIUM SERPL-MCNC: 9.2 MG/DL (ref 8.4–10.2)
EOSINOPHIL # BLD AUTO: 0 K/MM3 (ref 0–0.4)
EOSINOPHIL NFR BLD AUTO: 0 % (ref 0–4.3)
HCT VFR BLD CALC: 43.4 % (ref 30.3–42.9)
HEMOLYSIS INDEX: 10
HGB BLD-MCNC: 14.8 GM/DL (ref 10.1–14.3)
LYMPHOCYTES # BLD AUTO: 0.7 K/MM3 (ref 1.2–5.4)
LYMPHOCYTES NFR BLD AUTO: 4.9 % (ref 13.4–35)
MCHC RBC AUTO-ENTMCNC: 34 % (ref 30–34)
MCV RBC AUTO: 92 FL (ref 79–97)
MONOCYTES # (AUTO): 1.4 K/MM3 (ref 0–0.8)
MONOCYTES % (AUTO): 9.3 % (ref 0–7.3)
PLATELET # BLD: 423 K/MM3 (ref 140–440)
RBC # BLD AUTO: 4.71 M/MM3 (ref 3.65–5.03)

## 2020-11-15 RX ADMIN — FAMOTIDINE SCH MG: 10 INJECTION, SOLUTION INTRAVENOUS at 21:19

## 2020-11-15 RX ADMIN — IPRATROPIUM BROMIDE AND ALBUTEROL SULFATE SCH: .5; 3 SOLUTION RESPIRATORY (INHALATION) at 08:06

## 2020-11-15 RX ADMIN — HYDROMORPHONE HYDROCHLORIDE PRN MG: 1 INJECTION, SOLUTION INTRAMUSCULAR; INTRAVENOUS; SUBCUTANEOUS at 21:22

## 2020-11-15 RX ADMIN — Medication SCH ML: at 21:20

## 2020-11-15 RX ADMIN — IPRATROPIUM BROMIDE AND ALBUTEROL SULFATE SCH: .5; 3 SOLUTION RESPIRATORY (INHALATION) at 20:30

## 2020-11-15 RX ADMIN — Medication SCH ML: at 10:02

## 2020-11-15 RX ADMIN — METHYLPREDNISOLONE SODIUM SUCCINATE SCH MG: 125 INJECTION, POWDER, FOR SOLUTION INTRAMUSCULAR; INTRAVENOUS at 18:23

## 2020-11-15 RX ADMIN — IPRATROPIUM BROMIDE AND ALBUTEROL SULFATE SCH: .5; 3 SOLUTION RESPIRATORY (INHALATION) at 14:14

## 2020-11-15 RX ADMIN — METHYLPREDNISOLONE SODIUM SUCCINATE SCH MG: 125 INJECTION, POWDER, FOR SOLUTION INTRAMUSCULAR; INTRAVENOUS at 02:38

## 2020-11-15 RX ADMIN — METHYLPREDNISOLONE SODIUM SUCCINATE SCH MG: 125 INJECTION, POWDER, FOR SOLUTION INTRAMUSCULAR; INTRAVENOUS at 10:02

## 2020-11-15 RX ADMIN — FAMOTIDINE SCH MG: 10 INJECTION, SOLUTION INTRAVENOUS at 10:01

## 2020-11-15 NOTE — PROGRESS NOTE
Assessment and Plan


Assessment and plan: 





Sepsis


Patient meets criteria with leukocytosis, tachypnea, tachycardia and diagnosis 

of pneumonia.  Follow-up blood cultures





Pneumonia


Treat as community-acquired pneumonia


Postobstructive pneumonia unlikely





Lung cancer metastatic to bone


Patient needs follow-up with oncology/radiation therapy for possible 

chemotherapy and radiation therapy


Patient counseled about outpatient follow-up.  CT scan revealed metastasis 

including the right hilum, multiple ribs, multiple vertebral bodies, liver and 

likely the right adrenal gland





COPD (chronic obstructive pulmonary disease)


Patient on duo nebs and Solu-Medrol and antibiotics





Hyponatremia


Should correct with IV fluids





Hypokalemia


Supplemented





DVT prophylaxis


On heparin and GI prophylaxis





11/13/2020.  Patient reports dyspnea with minimal exertion.  I broached the 

issue of hospice but the patient would like to continue with aggressive care.  

Consult pulmonary for further evaluation.  Continue O2 to maintain sats greater 

than 92%.





11/14/2020.  Continue IV antibiotics.  Await pulmonary evaluation.  Anticipate 

discharge in a.m. if stable.





11/15/2020.  Pulmonary recommends CT-guided needle biopsy of the right upper 

lobe mass or possible bronchoscopy.  Continue bronchodilators/nebulizers.  

Continue IV antibiotics.








History


Interval history: 





No new issues overnight.  Patient still with dyspnea on minimal exertion.





Hospitalist Physical





- Constitutional


Vitals: 


                                        











Temp Pulse Resp BP Pulse Ox


 


 98.9 F   109 H  16   145/102   97 


 


 11/15/20 08:15  11/15/20 08:15  11/15/20 08:15  11/15/20 08:15  11/15/20 08:15











General appearance: Present: mild distress, well-nourished





- EENT


Eyes: Present: PERRL, EOM intact


ENT: hearing intact, clear oral mucosa, dentition normal





- Neck


Neck: Present: supple, normal ROM





- Respiratory


Respiratory effort: normal


Respiratory: bilateral: CTA





- Cardiovascular


Rhythm: regular


Heart Sounds: Present: S1 & S2.  Absent: gallop, rub





- Extremities


Extremities: no ischemia, No edema, Full ROM





- Abdominal


General gastrointestinal: soft, non-tender, non-distended, normal bowel sounds





- Integumentary


Integumentary: Present: clear, warm, dry





- Neurologic


Neurologic: CNII-XII intact, moves all extremities





HEART Score





- HEART Score


Troponin: 


                                        











Troponin T  < 0.010 ng/mL (0.00-0.029)   11/10/20  16:09    














Results





- Labs


CBC & Chem 7: 


                                 11/15/20 04:27





                                 11/15/20 04:27


Labs: 


                             Laboratory Last Values











WBC  15.2 K/mm3 (4.5-11.0)  H  11/15/20  04:27    


 


RBC  4.71 M/mm3 (3.65-5.03)   11/15/20  04:27    


 


Hgb  14.8 gm/dl (10.1-14.3)  H  11/15/20  04:27    


 


Hct  43.4 % (30.3-42.9)  H  11/15/20  04:27    


 


MCV  92 fl (79-97)   11/15/20  04:27    


 


MCH  31 pg (28-32)   11/15/20  04:27    


 


MCHC  34 % (30-34)   11/15/20  04:27    


 


RDW  13.6 % (13.2-15.2)   11/15/20  04:27    


 


Plt Count  423 K/mm3 (140-440)   11/15/20  04:27    


 


Lymph % (Auto)  4.9 % (13.4-35.0)  L  11/15/20  04:27    


 


Mono % (Auto)  9.3 % (0.0-7.3)  H  11/15/20  04:27    


 


Eos % (Auto)  0.0 % (0.0-4.3)   11/15/20  04:27    


 


Baso % (Auto)  0.1 % (0.0-1.8)   11/15/20  04:27    


 


Lymph # (Auto)  0.7 K/mm3 (1.2-5.4)  L  11/15/20  04:27    


 


Mono # (Auto)  1.4 K/mm3 (0.0-0.8)  H  11/15/20  04:27    


 


Eos # (Auto)  0.0 K/mm3 (0.0-0.4)   11/15/20  04:27    


 


Baso # (Auto)  0.0 K/mm3 (0.0-0.1)   11/15/20  04:27    


 


Add Manual Diff  Complete   11/12/20  11:47    


 


Total Counted  100   11/12/20  11:47    


 


Seg Neutrophils %  85.7 % (40.0-70.0)  H  11/15/20  04:27    


 


Seg Neuts % (Manual)  92.0 % (40.0-70.0)  H  11/12/20  11:47    


 


Band Neutrophils %  2.0 %  11/12/20  11:47    


 


Lymphocytes % (Manual)  2.0 % (13.4-35.0)  L  11/12/20  11:47    


 


Reactive Lymphs % (Man)  0 %  11/12/20  11:47    


 


Monocytes % (Manual)  4.0 % (0.0-7.3)   11/12/20  11:47    


 


Eosinophils % (Manual)  0 % (0.0-4.3)   11/12/20  11:47    


 


Basophils % (Manual)  0 % (0.0-1.8)   11/12/20  11:47    


 


Metamyelocytes %  0 %  11/12/20  11:47    


 


Myelocytes %  0 %  11/12/20  11:47    


 


Promyelocytes %  0 %  11/12/20  11:47    


 


Blast Cells %  0 %  11/12/20  11:47    


 


Nucleated RBC %  Not Reportable   11/12/20  11:47    


 


Seg Neutrophils #  13.1 K/mm3 (1.8-7.7)  H  11/15/20  04:27    


 


Seg Neutrophils # Man  20.1 K/mm3 (1.8-7.7)  H  11/12/20  11:47    


 


Band Neutrophils #  0.4 K/mm3  11/12/20  11:47    


 


Lymphocytes # (Manual)  0.4 K/mm3 (1.2-5.4)  L  11/12/20  11:47    


 


Abs React Lymphs (Man)  0.0 K/mm3  11/12/20  11:47    


 


Monocytes # (Manual)  0.9 K/mm3 (0.0-0.8)  H  11/12/20  11:47    


 


Eosinophils # (Manual)  0.0 K/mm3 (0.0-0.4)   11/12/20  11:47    


 


Basophils # (Manual)  0.0 K/mm3 (0.0-0.1)   11/12/20  11:47    


 


Metamyelocytes #  0.0 K/mm3  11/12/20  11:47    


 


Myelocytes #  0.0 K/mm3  11/12/20  11:47    


 


Promyelocytes #  0.0 K/mm3  11/12/20  11:47    


 


Blast Cells #  0.0 K/mm3  11/12/20  11:47    


 


WBC Morphology  Not Reportable   11/12/20  11:47    


 


Hypersegmented Neuts  Not Reportable   11/12/20  11:47    


 


Hyposegmented Neuts  Not Reportable   11/12/20  11:47    


 


Hypogranular Neuts  Not Reportable   11/12/20  11:47    


 


Smudge Cells  Not Reportable   11/12/20  11:47    


 


Toxic Granulation  Not Reportable   11/12/20  11:47    


 


Toxic Vacuolation  Not Reportable   11/12/20  11:47    


 


Dohle Bodies  Not Reportable   11/12/20  11:47    


 


Pelger-Huet Anomaly  Not Reportable   11/12/20  11:47    


 


Guerita Rods  Not Reportable   11/12/20  11:47    


 


Platelet Estimate  Consistent w auto   11/12/20  11:47    


 


Clumped Platelets  Not Reportable   11/12/20  11:47    


 


Plt Clumps, EDTA  Not Reportable   11/12/20  11:47    


 


Large Platelets  Not Reportable   11/12/20  11:47    


 


Giant Platelets  Not Reportable   11/12/20  11:47    


 


Platelet Satelliting  Not Reportable   11/12/20  11:47    


 


Plt Morphology Comment  Not Reportable   11/12/20  11:47    


 


RBC Morphology  Normal   11/12/20  11:47    


 


Dimorphic RBCs  Not Reportable   11/12/20  11:47    


 


Polychromasia  Not Reportable   11/12/20  11:47    


 


Hypochromasia  Not Reportable   11/12/20  11:47    


 


Poikilocytosis  Not Reportable   11/12/20  11:47    


 


Anisocytosis  Not Reportable   11/12/20  11:47    


 


Microcytosis  Not Reportable   11/12/20  11:47    


 


Macrocytosis  Not Reportable   11/12/20  11:47    


 


Spherocytes  Not Reportable   11/12/20  11:47    


 


Pappenheimer Bodies  Not Reportable   11/12/20  11:47    


 


Sickle Cells  Not Reportable   11/12/20  11:47    


 


Target Cells  Not Reportable   11/12/20  11:47    


 


Tear Drop Cells  Not Reportable   11/12/20  11:47    


 


Ovalocytes  Not Reportable   11/12/20  11:47    


 


Helmet Cells  Not Reportable   11/12/20  11:47    


 


Marie-Adamson Bodies  Not Reportable   11/12/20  11:47    


 


Cabot Rings  Not Reportable   11/12/20  11:47    


 


Glenwood Cells  Not Reportable   11/12/20  11:47    


 


Bite Cells  Not Reportable   11/12/20  11:47    


 


Crenated Cell  Not Reportable   11/12/20  11:47    


 


Elliptocytes  Not Reportable   11/12/20  11:47    


 


Acanthocytes (Spur)  Not Reportable   11/12/20  11:47    


 


Rouleaux  Not Reportable   11/12/20  11:47    


 


Hemoglobin C Crystals  Not Reportable   11/12/20  11:47    


 


Schistocytes  Not Reportable   11/12/20  11:47    


 


Malaria parasites  Not Reportable   11/12/20  11:47    


 


João Bodies  Not Reportable   11/12/20  11:47    


 


Hem Pathologist Commnt  No   11/12/20  11:47    


 


ABG pH  7.458 pH Units (7.350-7.450)  H  11/10/20  17:15    


 


ABG pCO2  34.3 mm Hg  11/10/20  17:15    


 


ABG pO2  70.4 mm Hg (80.0-90.0)  L  11/10/20  17:15    


 


ABG HCO3  23.7 mmol/L (20.0-26.0)   11/10/20  17:15    


 


ABG O2 Saturation  95.8 % (95.0-99.0)   11/10/20  17:15    


 


ABG O2 Content  19.0  (0.0-44)   11/10/20  17:15    


 


ABG Base Excess  0.4 mmol/L (-2.0-3.0)   11/10/20  17:15    


 


ABG Hemoglobin  14.5 gm/dl (12.0-16.0)   11/10/20  17:15    


 


ABG Carboxyhemoglobin  2.1 % (0.0-5.0)   11/10/20  17:15    


 


ABG Methemoglobin  0.5 % (0.0-1.5)   11/10/20  17:15    


 


Oxyhemoglobin  93.3 % (95.0-99.0)  L  11/10/20  17:15    


 


FiO2  21 %  11/10/20  17:15    


 


Sodium  135 mmol/L (137-145)  L  11/15/20  04:27    


 


Potassium  3.8 mmol/L (3.6-5.0)   11/15/20  04:27    


 


Chloride  94.5 mmol/L ()  L  11/15/20  04:27    


 


Carbon Dioxide  30 mmol/L (22-30)   11/15/20  04:27    


 


Anion Gap  14 mmol/L  11/15/20  04:27    


 


BUN  11 mg/dL (7-17)   11/15/20  04:27    


 


Creatinine  0.4 mg/dL (0.6-1.2)  L  11/15/20  04:27    


 


Estimated GFR  > 60 ml/min  11/15/20  04:27    


 


BUN/Creatinine Ratio  28 %  11/15/20  04:27    


 


Glucose  112 mg/dL ()  H  11/15/20  04:27    


 


Calcium  9.2 mg/dL (8.4-10.2)   11/15/20  04:27    


 


Magnesium  2.00 mg/dL (1.7-2.3)   11/10/20  16:00    


 


Total Bilirubin  0.40 mg/dL (0.1-1.2)   11/10/20  16:09    


 


AST  12 units/L (5-40)   11/10/20  16:09    


 


ALT  11 units/L (7-56)   11/10/20  16:09    


 


Alkaline Phosphatase  74 units/L ()   11/10/20  16:09    


 


Troponin T  < 0.010 ng/mL (0.00-0.029)   11/10/20  16:09    


 


Total Protein  6.5 g/dL (6.3-8.2)   11/10/20  16:09    


 


Albumin  3.5 g/dL (3.9-5)  L  11/10/20  16:09    


 


Albumin/Globulin Ratio  1.2 %  11/10/20  16:09    


 


TSH  2.560 mlU/mL (0.270-4.200)   11/10/20  19:01    


 


Free T4  1.66 ng/dL (0.76-1.46)  H  11/10/20  19:01    











Microbiology: 


Microbiology





11/10/20 19:09   Peripheral/Venous   Blood Culture - Preliminary


                            NO GROWTH AFTER 4 DAYS


11/10/20 19:01   Peripheral/Venous   Blood Culture - Preliminary


                            NO GROWTH AFTER 4 DAYS








Montilla/IV: 


                                        





Voiding Method                   Toilet


IV Catheter Type [Right          INT / Saline Lock


Forearm]                         


IV Catheter Type [Left Forearm   INT / Saline Lock


]                                


IV Catheter Type [Right          INT / Saline Lock


Antecubital]                     











Active Medications





- Current Medications


Current Medications: 














Generic Name Dose Route Start Last Admin





  Trade Name Freq  PRN Reason Stop Dose Admin


 


Acetaminophen  650 mg  11/10/20 21:44 





  Tylenol  PO  





  Q4H PRN  





  Pain MILD(1-3)/Fever >100.5/HA  


 


Albuterol  2.5 mg  11/10/20 22:17 





  Proventil  IH  





  Q3HRT PRN  





  Wheezing  


 


Albuterol/Ipratropium  1 ampul  11/14/20 20:00  11/15/20 08:06





  Duoneb *Not For Prn Use*  IH   Not Given





  TIDRT TENA  


 


Famotidine  10 mg  11/14/20 14:00  11/14/20 22:57





  Pepcid  IV   10 mg





  BID TENA   Administration


 


Hydromorphone HCl  1 mg  11/10/20 21:44  11/13/20 17:49





  Dilaudid  IV   1 mg





  Q3H PRN   Administration





  Pain , Severe (7-10)  


 


Methylprednisolone Sodium Succinate  60 mg  11/13/20 10:00  11/15/20 02:38





  Solu-Medrol  IV   60 mg





  Q8H TENA   Administration


 


Metoclopramide HCl  10 mg  11/10/20 21:44 





  Reglan  IV  





  Q6H PRN  





  Nausea And Vomiting  


 


Ondansetron HCl  4 mg  11/10/20 21:44 





  Zofran  IV  





  Q3H PRN  





  Nausea And Vomiting  


 


Oxycodone/Acetaminophen  1 tab  11/10/20 21:44  11/13/20 22:21





  Percocet 5/325  PO   1 tab





  Q6H PRN   Administration





  Pain, Moderate (4-6)  


 


Sodium Chloride  10 ml  11/10/20 22:00  11/14/20 22:58





  Sodium Chloride Flush Syringe 10 Ml  IV   10 ml





  BID TENA   Administration


 


Sodium Chloride  10 ml  11/10/20 21:44  11/13/20 00:35





  Sodium Chloride Flush Syringe 10 Ml  IV   10 ml





  PRN PRN   Administration





  LINE FLUSH  














Nutrition/Malnutrition Assess





- Dietary Evaluation


Nutrition/Malnutrition Findings: 


                                        





Nutrition Notes                                            Start:  11/12/20 

11:46


Freq:                                                      Status: Active       




Protocol:                                                                       




 Document     11/12/20 11:46  EN  (Rec: 11/12/20 11:58  EN  [id number])


 Co-Sign      11/12/20 11:46  LM


 Nutrition Notes


     Need for Assessment generated from:         RN Referral,MST


     Initial or Follow up                        Assessment


     Other Pertinent Diagnosis                   Pneu, Thyroiditis, bone


                                                 metastasis, liver metastasis,


                                                 lung mass, dyspnea


     Current Diet                                Regular


     Labs/Tests                                  11/10:


                                                 a 132, K+ 3.5, BUN 4, Cr 0.4


     Pertinent Medications                       D5ns at 100 ml/hr


     Height                                      5 ft 2 in


     Weight                                      50 kg


     Ideal Body Weight (kg)                      50.00


     BMI                                         20.1


     Intake Prior to Admission                   Poor


     Weight change and time frame                6% weight loss in 2 weeks


     Weight Status                               Appropriate


     Subjective/Other Information                RD consulted for MST score. Pt


                                                 reports weighing 119 pounds


                                                 at MD office on 10/2/2020 and


                                                 112 pounds at MD office on 10/


                                                 19/2020. Pt reports decreased


                                                 appetite both PTA and during


                                                 admission due to cancer. Pt


                                                 denies N/V/D. Pt states that


                                                 she typically eats one meal


                                                 per day due to decreased


                                                 appetite. Pt declines ONS


                                                 stating that she does not like


                                                 the taste of them. Pt would


                                                 like no eggs during meals and


                                                 prefers biscuits with gravy,


                                                 corn flakes, bananas, fruit


                                                 and salad with ranch dressing.


     Burn                                        Absent


     Trauma                                      Absent


     Food Allergy                                No


     Current % PO                                Poor (25-49%)


     Minimum of two criteria                     Yes


     Energy Intake (non-severe)                  <75% Estimated Energy


                                                 Requirement >7 days


     Interpretation of Weight Loss (severe)      >2% in 1 week


     #1


      Nutrition Diagnosis                        Malnutrition


      Etiology                                   cancer diagnosis


      As Evidenced by Signs and Symptoms         decreased appetite, 6% weight


                                                 loss in 2 weeks and consuming


                                                 only 1 meal per day


     Is patient on ventilator?                   No


     Is Patient Ambulatory and/or Out of Bed     Yes


     REE-([facility]-ambulatory/OOB) [     1343.225


      [email or web address]]                               


     Calculation Used for Recommendations        [facility]


     Additional Notes                            Protein: 1.2-1.5 g/kg (60-75g)


                                                 Fluid: 1 ml/kcal


 Nutrition Intervention


     Change Diet Order:                          Continue regular diet


     Goal #1                                     Meet 75% of energy and protein


                                                 needs


     Anticipated Discharge Needs:                Regular diet


     Follow-Up By:                               11/17/20


     Additional Comments                         F/u for intakes

## 2020-11-16 RX ADMIN — IPRATROPIUM BROMIDE AND ALBUTEROL SULFATE SCH: .5; 3 SOLUTION RESPIRATORY (INHALATION) at 20:54

## 2020-11-16 RX ADMIN — Medication SCH ML: at 21:08

## 2020-11-16 RX ADMIN — HYDROMORPHONE HYDROCHLORIDE PRN MG: 1 INJECTION, SOLUTION INTRAMUSCULAR; INTRAVENOUS; SUBCUTANEOUS at 14:37

## 2020-11-16 RX ADMIN — METHYLPREDNISOLONE SODIUM SUCCINATE SCH MG: 125 INJECTION, POWDER, FOR SOLUTION INTRAMUSCULAR; INTRAVENOUS at 02:42

## 2020-11-16 RX ADMIN — Medication SCH ML: at 09:46

## 2020-11-16 RX ADMIN — IPRATROPIUM BROMIDE AND ALBUTEROL SULFATE SCH: .5; 3 SOLUTION RESPIRATORY (INHALATION) at 09:46

## 2020-11-16 RX ADMIN — HYDROMORPHONE HYDROCHLORIDE PRN MG: 1 INJECTION, SOLUTION INTRAMUSCULAR; INTRAVENOUS; SUBCUTANEOUS at 21:08

## 2020-11-16 RX ADMIN — FAMOTIDINE SCH MG: 10 TABLET ORAL at 21:08

## 2020-11-16 RX ADMIN — METHYLPREDNISOLONE SODIUM SUCCINATE SCH MG: 125 INJECTION, POWDER, FOR SOLUTION INTRAMUSCULAR; INTRAVENOUS at 18:35

## 2020-11-16 RX ADMIN — IPRATROPIUM BROMIDE AND ALBUTEROL SULFATE SCH: .5; 3 SOLUTION RESPIRATORY (INHALATION) at 16:23

## 2020-11-16 RX ADMIN — FAMOTIDINE SCH MG: 10 INJECTION, SOLUTION INTRAVENOUS at 09:44

## 2020-11-16 RX ADMIN — METHYLPREDNISOLONE SODIUM SUCCINATE SCH MG: 125 INJECTION, POWDER, FOR SOLUTION INTRAMUSCULAR; INTRAVENOUS at 09:44

## 2020-11-16 NOTE — PROGRESS NOTE
Assessment and Plan





Not sure if rads can get to this mass, if possible guess ok to go through with 

biopsy.  However if not, would suggest biopsy of liver mass vs adrenal mass as 

this would likely not only give you the diagnosis but the stage as well.


Will add BID pulmicort


Guarded prognosis.





Subjective


Date of service: 11/16/20


Principal diagnosis: Pneumonia and lung cancer with metastasis


Interval history: 





My partner ordered a CT guided biopsy of lung mass.





Objective


                               Vital Signs - 12hr











  11/16/20 11/16/20 11/16/20





  04:30 08:46 09:51


 


Temperature 97.0 F L 97.5 F L 


 


Pulse Rate 104 H 109 H 


 


Respiratory 24 20 





Rate   


 


Blood Pressure 132/95 137/100 


 


O2 Sat by Pulse 91 94 98





Oximetry   














  11/16/20





  11:14


 


Temperature 97.8 F


 


Pulse Rate 108 H


 


Respiratory 18





Rate 


 


Blood Pressure 141/95


 


O2 Sat by Pulse 97





Oximetry 











Constitutional: no acute distress


Eyes: non-icteric


ENT: oropharynx moist


Neck: supple, no JVD


Effort: normal


Ascultation: Bilateral: diminished breath sounds


Cardiovascular: regular rate and rhythm


Gastrointestinal: normoactive bowel sounds, soft, non-tender


Integumentary: normal


Extremities: no cyanosis, no edema, pink and warm


Neurologic: normal mental status, non-focal exam


Psychiatric: mood appropriate


CBC and BMP: 


                                 11/15/20 04:27





                                 11/15/20 04:27


ABG, PT/INR, D-dimer: 


ABG











ABG pH  7.458 pH Units (7.350-7.450)  H  11/10/20  17:15    


 


ABG pCO2  34.3 mm Hg  11/10/20  17:15    


 


ABG pO2  70.4 mm Hg (80.0-90.0)  L  11/10/20  17:15    


 


ABG O2 Saturation  95.8 % (95.0-99.0)   11/10/20  17:15    








Abnormal lab findings: 


                                  Abnormal Labs











  11/10/20 11/10/20 11/10/20





  16:09 16:09 17:15


 


WBC  16.0 H  


 


Hgb  14.9 H  


 


Hct  43.9 H  


 


Lymph % (Auto)  13.1 L  


 


Mono % (Auto)  10.0 H  


 


Lymph # (Auto)   


 


Mono # (Auto)  1.6 H  


 


Baso # (Auto)  0.2 H  


 


Seg Neutrophils %  74.2 H  


 


Seg Neuts % (Manual)   


 


Lymphocytes % (Manual)   


 


Seg Neutrophils #  11.9 H  


 


Seg Neutrophils # Man   


 


Lymphocytes # (Manual)   


 


Monocytes # (Manual)   


 


ABG pH    7.458 H


 


ABG pO2    70.4 L


 


Oxyhemoglobin    93.3 L


 


Sodium   132 L 


 


Potassium   3.5 L 


 


Chloride   92.8 L 


 


BUN   4 L 


 


Creatinine   0.4 L 


 


Glucose   


 


Albumin   3.5 L 


 


Free T4   














  11/10/20 11/12/20 11/12/20





  19:01 11:47 11:47


 


WBC   21.9 H 


 


Hgb   


 


Hct   


 


Lymph % (Auto)   


 


Mono % (Auto)   


 


Lymph # (Auto)   


 


Mono # (Auto)   


 


Baso # (Auto)   


 


Seg Neutrophils %   


 


Seg Neuts % (Manual)   92.0 H 


 


Lymphocytes % (Manual)   2.0 L 


 


Seg Neutrophils #   


 


Seg Neutrophils # Man   20.1 H 


 


Lymphocytes # (Manual)   0.4 L 


 


Monocytes # (Manual)   0.9 H 


 


ABG pH   


 


ABG pO2   


 


Oxyhemoglobin   


 


Sodium    136 L


 


Potassium   


 


Chloride   


 


BUN   


 


Creatinine    0.5 L


 


Glucose    119 H


 


Albumin   


 


Free T4  1.66 H  














  11/15/20 11/15/20





  04:27 04:27


 


WBC  15.2 H 


 


Hgb  14.8 H 


 


Hct  43.4 H 


 


Lymph % (Auto)  4.9 L 


 


Mono % (Auto)  9.3 H 


 


Lymph # (Auto)  0.7 L 


 


Mono # (Auto)  1.4 H 


 


Baso # (Auto)  


 


Seg Neutrophils %  85.7 H 


 


Seg Neuts % (Manual)  


 


Lymphocytes % (Manual)  


 


Seg Neutrophils #  13.1 H 


 


Seg Neutrophils # Man  


 


Lymphocytes # (Manual)  


 


Monocytes # (Manual)  


 


ABG pH  


 


ABG pO2  


 


Oxyhemoglobin  


 


Sodium   135 L


 


Potassium  


 


Chloride   94.5 L


 


BUN  


 


Creatinine   0.4 L


 


Glucose   112 H


 


Albumin  


 


Free T4

## 2020-11-16 NOTE — PROGRESS NOTE
Assessment and Plan


Assessment and plan: 





Sepsis


Patient meets criteria with leukocytosis, tachypnea, tachycardia and diagnosis 

of pneumonia.  Follow-up blood cultures





Pneumonia


Treat as community-acquired pneumonia


Postobstructive pneumonia unlikely





Lung cancer metastatic to bone


Patient needs follow-up with oncology/radiation therapy for possible 

chemotherapy and radiation therapy


Patient counseled about outpatient follow-up.  CT scan revealed metastasis 

including the right hilum, multiple ribs, multiple vertebral bodies, liver and 

likely the right adrenal gland





COPD (chronic obstructive pulmonary disease)


Patient on duo nebs and Solu-Medrol and antibiotics





Hyponatremia


Should correct with IV fluids





Hypokalemia


Supplemented





DVT prophylaxis


On heparin and GI prophylaxis





11/13/2020.  Patient reports dyspnea with minimal exertion.  I broached the 

issue of hospice but the patient would like to continue with aggressive care.  

Consult pulmonary for further evaluation.  Continue O2 to maintain sats greater 

than 92%.





11/14/2020.  Continue IV antibiotics.  Await pulmonary evaluation.  Anticipate 

discharge in a.m. if stable.





11/15/2020.  Pulmonary recommends CT-guided needle biopsy of the right upper 

lobe mass or possible bronchoscopy.  Continue bronchodilators/nebulizers.  

Continue IV antibiotics.





11/16/2020.  Await pulmonary decision for CT-guided needle biopsy versus 

bronchoscopy.  Continue bronchodilators/nebulizers.  Continue IV antibiotics.  

Patient currently with 2 L/min satting at 98%





History


Interval history: 





No new issues overnight.  Patient still with dyspnea on minimal exertion.





Hospitalist Physical





- Constitutional


Vitals: 


                                        











Temp Pulse Resp BP Pulse Ox


 


 97.0 F L  104 H  24   132/95   98 


 


 11/16/20 04:30  11/16/20 04:30  11/16/20 04:30  11/16/20 04:30  11/16/20 09:51











General appearance: Present: mild distress, well-nourished





- EENT


Eyes: Present: PERRL, EOM intact


ENT: hearing intact, clear oral mucosa, dentition normal





- Neck


Neck: Present: supple, normal ROM





- Respiratory


Respiratory effort: normal


Respiratory: bilateral: CTA





- Cardiovascular


Rhythm: regular


Heart Sounds: Present: S1 & S2.  Absent: gallop, rub





- Extremities


Extremities: no ischemia, No edema, Full ROM





- Abdominal


General gastrointestinal: soft, non-tender, non-distended, normal bowel sounds





- Integumentary


Integumentary: Present: clear, warm, dry





- Neurologic


Neurologic: CNII-XII intact, moves all extremities





HEART Score





- HEART Score


Troponin: 


                                        











Troponin T  < 0.010 ng/mL (0.00-0.029)   11/10/20  16:09    














Results





- Labs


CBC & Chem 7: 


                                 11/15/20 04:27





                                 11/15/20 04:27


Labs: 


                             Laboratory Last Values











WBC  15.2 K/mm3 (4.5-11.0)  H  11/15/20  04:27    


 


RBC  4.71 M/mm3 (3.65-5.03)   11/15/20  04:27    


 


Hgb  14.8 gm/dl (10.1-14.3)  H  11/15/20  04:27    


 


Hct  43.4 % (30.3-42.9)  H  11/15/20  04:27    


 


MCV  92 fl (79-97)   11/15/20  04:27    


 


MCH  31 pg (28-32)   11/15/20  04:27    


 


MCHC  34 % (30-34)   11/15/20  04:27    


 


RDW  13.6 % (13.2-15.2)   11/15/20  04:27    


 


Plt Count  423 K/mm3 (140-440)   11/15/20  04:27    


 


Lymph % (Auto)  4.9 % (13.4-35.0)  L  11/15/20  04:27    


 


Mono % (Auto)  9.3 % (0.0-7.3)  H  11/15/20  04:27    


 


Eos % (Auto)  0.0 % (0.0-4.3)   11/15/20  04:27    


 


Baso % (Auto)  0.1 % (0.0-1.8)   11/15/20  04:27    


 


Lymph # (Auto)  0.7 K/mm3 (1.2-5.4)  L  11/15/20  04:27    


 


Mono # (Auto)  1.4 K/mm3 (0.0-0.8)  H  11/15/20  04:27    


 


Eos # (Auto)  0.0 K/mm3 (0.0-0.4)   11/15/20  04:27    


 


Baso # (Auto)  0.0 K/mm3 (0.0-0.1)   11/15/20  04:27    


 


Add Manual Diff  Complete   11/12/20  11:47    


 


Total Counted  100   11/12/20  11:47    


 


Seg Neutrophils %  85.7 % (40.0-70.0)  H  11/15/20  04:27    


 


Seg Neuts % (Manual)  92.0 % (40.0-70.0)  H  11/12/20  11:47    


 


Band Neutrophils %  2.0 %  11/12/20  11:47    


 


Lymphocytes % (Manual)  2.0 % (13.4-35.0)  L  11/12/20  11:47    


 


Reactive Lymphs % (Man)  0 %  11/12/20  11:47    


 


Monocytes % (Manual)  4.0 % (0.0-7.3)   11/12/20  11:47    


 


Eosinophils % (Manual)  0 % (0.0-4.3)   11/12/20  11:47    


 


Basophils % (Manual)  0 % (0.0-1.8)   11/12/20  11:47    


 


Metamyelocytes %  0 %  11/12/20  11:47    


 


Myelocytes %  0 %  11/12/20  11:47    


 


Promyelocytes %  0 %  11/12/20  11:47    


 


Blast Cells %  0 %  11/12/20  11:47    


 


Nucleated RBC %  Not Reportable   11/12/20  11:47    


 


Seg Neutrophils #  13.1 K/mm3 (1.8-7.7)  H  11/15/20  04:27    


 


Seg Neutrophils # Man  20.1 K/mm3 (1.8-7.7)  H  11/12/20  11:47    


 


Band Neutrophils #  0.4 K/mm3  11/12/20  11:47    


 


Lymphocytes # (Manual)  0.4 K/mm3 (1.2-5.4)  L  11/12/20  11:47    


 


Abs React Lymphs (Man)  0.0 K/mm3  11/12/20  11:47    


 


Monocytes # (Manual)  0.9 K/mm3 (0.0-0.8)  H  11/12/20  11:47    


 


Eosinophils # (Manual)  0.0 K/mm3 (0.0-0.4)   11/12/20  11:47    


 


Basophils # (Manual)  0.0 K/mm3 (0.0-0.1)   11/12/20  11:47    


 


Metamyelocytes #  0.0 K/mm3  11/12/20  11:47    


 


Myelocytes #  0.0 K/mm3  11/12/20  11:47    


 


Promyelocytes #  0.0 K/mm3  11/12/20  11:47    


 


Blast Cells #  0.0 K/mm3  11/12/20  11:47    


 


WBC Morphology  Not Reportable   11/12/20  11:47    


 


Hypersegmented Neuts  Not Reportable   11/12/20  11:47    


 


Hyposegmented Neuts  Not Reportable   11/12/20  11:47    


 


Hypogranular Neuts  Not Reportable   11/12/20  11:47    


 


Smudge Cells  Not Reportable   11/12/20  11:47    


 


Toxic Granulation  Not Reportable   11/12/20  11:47    


 


Toxic Vacuolation  Not Reportable   11/12/20  11:47    


 


Dohle Bodies  Not Reportable   11/12/20  11:47    


 


Pelger-Huet Anomaly  Not Reportable   11/12/20  11:47    


 


Guerita Rods  Not Reportable   11/12/20  11:47    


 


Platelet Estimate  Consistent w auto   11/12/20  11:47    


 


Clumped Platelets  Not Reportable   11/12/20  11:47    


 


Plt Clumps, EDTA  Not Reportable   11/12/20  11:47    


 


Large Platelets  Not Reportable   11/12/20  11:47    


 


Giant Platelets  Not Reportable   11/12/20  11:47    


 


Platelet Satelliting  Not Reportable   11/12/20  11:47    


 


Plt Morphology Comment  Not Reportable   11/12/20  11:47    


 


RBC Morphology  Normal   11/12/20  11:47    


 


Dimorphic RBCs  Not Reportable   11/12/20  11:47    


 


Polychromasia  Not Reportable   11/12/20  11:47    


 


Hypochromasia  Not Reportable   11/12/20  11:47    


 


Poikilocytosis  Not Reportable   11/12/20  11:47    


 


Anisocytosis  Not Reportable   11/12/20  11:47    


 


Microcytosis  Not Reportable   11/12/20  11:47    


 


Macrocytosis  Not Reportable   11/12/20  11:47    


 


Spherocytes  Not Reportable   11/12/20  11:47    


 


Pappenheimer Bodies  Not Reportable   11/12/20  11:47    


 


Sickle Cells  Not Reportable   11/12/20  11:47    


 


Target Cells  Not Reportable   11/12/20  11:47    


 


Tear Drop Cells  Not Reportable   11/12/20  11:47    


 


Ovalocytes  Not Reportable   11/12/20  11:47    


 


Helmet Cells  Not Reportable   11/12/20  11:47    


 


Marie-Catron Bodies  Not Reportable   11/12/20  11:47    


 


Cabot Rings  Not Reportable   11/12/20  11:47    


 


Mahogany Cells  Not Reportable   11/12/20  11:47    


 


Bite Cells  Not Reportable   11/12/20  11:47    


 


Crenated Cell  Not Reportable   11/12/20  11:47    


 


Elliptocytes  Not Reportable   11/12/20  11:47    


 


Acanthocytes (Spur)  Not Reportable   11/12/20  11:47    


 


Rouleaux  Not Reportable   11/12/20  11:47    


 


Hemoglobin C Crystals  Not Reportable   11/12/20  11:47    


 


Schistocytes  Not Reportable   11/12/20  11:47    


 


Malaria parasites  Not Reportable   11/12/20  11:47    


 


João Bodies  Not Reportable   11/12/20  11:47    


 


Hem Pathologist Commnt  No   11/12/20  11:47    


 


ABG pH  7.458 pH Units (7.350-7.450)  H  11/10/20  17:15    


 


ABG pCO2  34.3 mm Hg  11/10/20  17:15    


 


ABG pO2  70.4 mm Hg (80.0-90.0)  L  11/10/20  17:15    


 


ABG HCO3  23.7 mmol/L (20.0-26.0)   11/10/20  17:15    


 


ABG O2 Saturation  95.8 % (95.0-99.0)   11/10/20  17:15    


 


ABG O2 Content  19.0  (0.0-44)   11/10/20  17:15    


 


ABG Base Excess  0.4 mmol/L (-2.0-3.0)   11/10/20  17:15    


 


ABG Hemoglobin  14.5 gm/dl (12.0-16.0)   11/10/20  17:15    


 


ABG Carboxyhemoglobin  2.1 % (0.0-5.0)   11/10/20  17:15    


 


ABG Methemoglobin  0.5 % (0.0-1.5)   11/10/20  17:15    


 


Oxyhemoglobin  93.3 % (95.0-99.0)  L  11/10/20  17:15    


 


FiO2  21 %  11/10/20  17:15    


 


Sodium  135 mmol/L (137-145)  L  11/15/20  04:27    


 


Potassium  3.8 mmol/L (3.6-5.0)   11/15/20  04:27    


 


Chloride  94.5 mmol/L ()  L  11/15/20  04:27    


 


Carbon Dioxide  30 mmol/L (22-30)   11/15/20  04:27    


 


Anion Gap  14 mmol/L  11/15/20  04:27    


 


BUN  11 mg/dL (7-17)   11/15/20  04:27    


 


Creatinine  0.4 mg/dL (0.6-1.2)  L  11/15/20  04:27    


 


Estimated GFR  > 60 ml/min  11/15/20  04:27    


 


BUN/Creatinine Ratio  28 %  11/15/20  04:27    


 


Glucose  112 mg/dL ()  H  11/15/20  04:27    


 


Calcium  9.2 mg/dL (8.4-10.2)   11/15/20  04:27    


 


Magnesium  2.00 mg/dL (1.7-2.3)   11/10/20  16:00    


 


Total Bilirubin  0.40 mg/dL (0.1-1.2)   11/10/20  16:09    


 


AST  12 units/L (5-40)   11/10/20  16:09    


 


ALT  11 units/L (7-56)   11/10/20  16:09    


 


Alkaline Phosphatase  74 units/L ()   11/10/20  16:09    


 


Troponin T  < 0.010 ng/mL (0.00-0.029)   11/10/20  16:09    


 


Total Protein  6.5 g/dL (6.3-8.2)   11/10/20  16:09    


 


Albumin  3.5 g/dL (3.9-5)  L  11/10/20  16:09    


 


Albumin/Globulin Ratio  1.2 %  11/10/20  16:09    


 


TSH  2.560 mlU/mL (0.270-4.200)   11/10/20  19:01    


 


Free T4  1.66 ng/dL (0.76-1.46)  H  11/10/20  19:01    











Microbiology: 


Microbiology





11/10/20 19:09   Peripheral/Venous   Blood Culture - Final


                            NO GROWTH AFTER 5 DAYS


11/10/20 19:01   Peripheral/Venous   Blood Culture - Final


                            NO GROWTH AFTER 5 DAYS








Montilla/IV: 


                                        





Voiding Method                   Toilet


IV Catheter Type [Right          INT / Saline Lock


Forearm]                         


IV Catheter Type [Left Forearm   INT / Saline Lock


]                                


IV Catheter Type [Right          INT / Saline Lock


Antecubital]                     











Active Medications





- Current Medications


Current Medications: 














Generic Name Dose Route Start Last Admin





  Trade Name Freq  PRN Reason Stop Dose Admin


 


Acetaminophen  650 mg  11/10/20 21:44 





  Tylenol  PO  





  Q4H PRN  





  Pain MILD(1-3)/Fever >100.5/HA  


 


Albuterol  2.5 mg  11/10/20 22:17 





  Proventil  IH  





  Q3HRT PRN  





  Wheezing  


 


Albuterol/Ipratropium  1 ampul  11/14/20 20:00  11/16/20 09:46





  Duoneb *Not For Prn Use*  IH   Not Given





  TIDRT TENA  


 


Famotidine  10 mg  11/16/20 22:00 





  Pepcid  PO  





  BID TENA  


 


Hydromorphone HCl  1 mg  11/10/20 21:44  11/15/20 21:22





  Dilaudid  IV   1 mg





  Q3H PRN   Administration





  Pain , Severe (7-10)  


 


Methylprednisolone Sodium Succinate  60 mg  11/13/20 10:00  11/16/20 09:44





  Solu-Medrol  IV   60 mg





  Q8H TENA   Administration


 


Metoclopramide HCl  10 mg  11/10/20 21:44 





  Reglan  IV  





  Q6H PRN  





  Nausea And Vomiting  


 


Ondansetron HCl  4 mg  11/10/20 21:44 





  Zofran  IV  





  Q3H PRN  





  Nausea And Vomiting  


 


Oxycodone/Acetaminophen  1 tab  11/10/20 21:44  11/13/20 22:21





  Percocet 5/325  PO   1 tab





  Q6H PRN   Administration





  Pain, Moderate (4-6)  


 


Sodium Chloride  10 ml  11/10/20 22:00  11/16/20 09:46





  Sodium Chloride Flush Syringe 10 Ml  IV   10 ml





  BID TENA   Administration


 


Sodium Chloride  10 ml  11/10/20 21:44  11/13/20 00:35





  Sodium Chloride Flush Syringe 10 Ml  IV   10 ml





  PRN PRN   Administration





  LINE FLUSH  














Nutrition/Malnutrition Assess





- Dietary Evaluation


Nutrition/Malnutrition Findings: 


                                        





Nutrition Notes                                            Start:  11/12/20 

11:46


Freq:                                                      Status: Active       




Protocol:                                                                       




 Document     11/12/20 11:46  EN  (Rec: 11/12/20 11:58  EN  [id number])


 Co-Sign      11/12/20 11:46  LM


 Nutrition Notes


     Need for Assessment generated from:         RN Referral,MST


     Initial or Follow up                        Assessment


     Other Pertinent Diagnosis                   Pneu, Thyroiditis, bone


                                                 metastasis, liver metastasis,


                                                 lung mass, dyspnea


     Current Diet                                Regular


     Labs/Tests                                  11/10:


                                                 a 132, K+ 3.5, BUN 4, Cr 0.4


     Pertinent Medications                       D5ns at 100 ml/hr


     Height                                      5 ft 2 in


     Weight                                      50 kg


     Ideal Body Weight (kg)                      50.00


     BMI                                         20.1


     Intake Prior to Admission                   Poor


     Weight change and time frame                6% weight loss in 2 weeks


     Weight Status                               Appropriate


     Subjective/Other Information                RD consulted for MST score. Pt


                                                 reports weighing 119 pounds


                                                 at MD office on 10/2/2020 and


                                                 112 pounds at MD office on 10/


                                                 19/2020. Pt reports decreased


                                                 appetite both PTA and during


                                                 admission due to cancer. Pt


                                                 denies N/V/D. Pt states that


                                                 she typically eats one meal


                                                 per day due to decreased


                                                 appetite. Pt declines ONS


                                                 stating that she does not like


                                                 the taste of them. Pt would


                                                 like no eggs during meals and


                                                 prefers biscuits with gravy,


                                                 corn flakes, bananas, fruit


                                                 and salad with ranch dressing.


     Burn                                        Absent


     Trauma                                      Absent


     Food Allergy                                No


     Current % PO                                Poor (25-49%)


     Minimum of two criteria                     Yes


     Energy Intake (non-severe)                  <75% Estimated Energy


                                                 Requirement >7 days


     Interpretation of Weight Loss (severe)      >2% in 1 week


     #1


      Nutrition Diagnosis                        Malnutrition


      Etiology                                   cancer diagnosis


      As Evidenced by Signs and Symptoms         decreased appetite, 6% weight


                                                 loss in 2 weeks and consuming


                                                 only 1 meal per day


     Is patient on ventilator?                   No


     Is Patient Ambulatory and/or Out of Bed     Yes


     REE-([facility]. [facility]-ambulatory/OOB) [     [id number]


      [email or web address]]                               


     Calculation Used for Recommendations        [facility]


     Additional Notes                            Protein: 1.2-1.5 g/kg (60-75g)


                                                 Fluid: 1 ml/kcal


 Nutrition Intervention


     Change Diet Order:                          Continue regular diet


     Goal #1                                     Meet 75% of energy and protein


                                                 needs


     Anticipated Discharge Needs:                Regular diet


     Follow-Up By:                               11/17/20


     Additional Comments                         F/u for intakes

## 2020-11-17 LAB
BASOPHILS # (AUTO): 0 K/MM3 (ref 0–0.1)
BASOPHILS NFR BLD AUTO: 0.1 % (ref 0–1.8)
BUN SERPL-MCNC: 16 MG/DL (ref 7–17)
BUN/CREAT SERPL: 32 %
CALCIUM SERPL-MCNC: 8.8 MG/DL (ref 8.4–10.2)
EOSINOPHIL # BLD AUTO: 0 K/MM3 (ref 0–0.4)
EOSINOPHIL NFR BLD AUTO: 0 % (ref 0–4.3)
HCT VFR BLD CALC: 45.3 % (ref 30.3–42.9)
HEMOLYSIS INDEX: 24
HGB BLD-MCNC: 15 GM/DL (ref 10.1–14.3)
LYMPHOCYTES # BLD AUTO: 0.8 K/MM3 (ref 1.2–5.4)
LYMPHOCYTES NFR BLD AUTO: 4.8 % (ref 13.4–35)
MCHC RBC AUTO-ENTMCNC: 33 % (ref 30–34)
MCV RBC AUTO: 93 FL (ref 79–97)
MONOCYTES # (AUTO): 1.1 K/MM3 (ref 0–0.8)
MONOCYTES % (AUTO): 6.6 % (ref 0–7.3)
PLATELET # BLD: 400 K/MM3 (ref 140–440)
RBC # BLD AUTO: 4.89 M/MM3 (ref 3.65–5.03)

## 2020-11-17 RX ADMIN — IPRATROPIUM BROMIDE AND ALBUTEROL SULFATE SCH: .5; 3 SOLUTION RESPIRATORY (INHALATION) at 20:58

## 2020-11-17 RX ADMIN — Medication SCH ML: at 10:19

## 2020-11-17 RX ADMIN — BUDESONIDE SCH: 0.5 INHALANT RESPIRATORY (INHALATION) at 20:58

## 2020-11-17 RX ADMIN — Medication SCH ML: at 22:00

## 2020-11-17 RX ADMIN — FAMOTIDINE SCH MG: 10 TABLET ORAL at 10:19

## 2020-11-17 RX ADMIN — FAMOTIDINE SCH: 10 TABLET ORAL at 22:53

## 2020-11-17 RX ADMIN — METHYLPREDNISOLONE SODIUM SUCCINATE SCH MG: 125 INJECTION, POWDER, FOR SOLUTION INTRAMUSCULAR; INTRAVENOUS at 02:44

## 2020-11-17 RX ADMIN — FAMOTIDINE SCH: 10 TABLET ORAL at 10:20

## 2020-11-17 RX ADMIN — METHYLPREDNISOLONE SODIUM SUCCINATE SCH MG: 125 INJECTION, POWDER, FOR SOLUTION INTRAMUSCULAR; INTRAVENOUS at 10:18

## 2020-11-17 RX ADMIN — HYDROMORPHONE HYDROCHLORIDE PRN MG: 1 INJECTION, SOLUTION INTRAMUSCULAR; INTRAVENOUS; SUBCUTANEOUS at 10:18

## 2020-11-17 RX ADMIN — METHYLPREDNISOLONE SODIUM SUCCINATE SCH MG: 125 INJECTION, POWDER, FOR SOLUTION INTRAMUSCULAR; INTRAVENOUS at 18:02

## 2020-11-17 RX ADMIN — IPRATROPIUM BROMIDE AND ALBUTEROL SULFATE SCH: .5; 3 SOLUTION RESPIRATORY (INHALATION) at 07:56

## 2020-11-17 RX ADMIN — HYDROMORPHONE HYDROCHLORIDE PRN MG: 1 INJECTION, SOLUTION INTRAMUSCULAR; INTRAVENOUS; SUBCUTANEOUS at 19:47

## 2020-11-17 RX ADMIN — IPRATROPIUM BROMIDE AND ALBUTEROL SULFATE SCH: .5; 3 SOLUTION RESPIRATORY (INHALATION) at 13:33

## 2020-11-17 NOTE — PROGRESS NOTE
Assessment and Plan


Assessment and plan: 





Sepsis


Patient meets criteria with leukocytosis, tachypnea, tachycardia and diagnosis 

of pneumonia.  Follow-up blood cultures





Pneumonia


Treat as community-acquired pneumonia


Postobstructive pneumonia unlikely





Lung cancer metastatic to bone


Patient needs follow-up with oncology/radiation therapy for possible 

chemotherapy and radiation therapy


Patient counseled about outpatient follow-up.  CT scan revealed metastasis 

including the right hilum, multiple ribs, multiple vertebral bodies, liver and 

likely the right adrenal gland





COPD (chronic obstructive pulmonary disease)


Patient on duo nebs and Solu-Medrol and antibiotics





Hyponatremia


Should correct with IV fluids





Hypokalemia


Supplemented





DVT prophylaxis


On heparin and GI prophylaxis





11/13/2020.  Patient reports dyspnea with minimal exertion.  I broached the 

issue of hospice but the patient would like to continue with aggressive care.  

Consult pulmonary for further evaluation.  Continue O2 to maintain sats greater 

than 92%.





11/14/2020.  Continue IV antibiotics.  Await pulmonary evaluation.  Anticipate 

discharge in a.m. if stable.





11/15/2020.  Pulmonary recommends CT-guided needle biopsy of the right upper 

lobe mass or possible bronchoscopy.  Continue bronchodilators/nebulizers.  

Continue IV antibiotics.





11/16/2020.  Patient has biopsy today, will follow biopsy results.  Patient 

finished a course of IV antibiotics.  Patient currently with 2 L/min satting at 

98%





11/7/2020; patient is supposed to have biopsy today but there is no radiologist 

on site and will be done tomorrow.  Patient is currently on 2 L of oxygen.  My 

feeling of voice is improving.





History


Interval history: 





Patient was seen and evaluated this morning


Patient is on 2 L of oxygen


Patient said her muffling voice is improving





Hospitalist Physical





- Physical exam


Narrative exam: 





Patient is on 2 L of oxygen


 The patient appeared well nourished and normally developed.


 Vital signs as documented.


 Head exam is unremarkable.


 No scleral icterus .


 Neck is without jugular venous distension, thyromegaly, or carotid bruits. 


 Lungs are clear to auscultation.


Cardiac exam reveals regular rate and  Rhythm.  Patient is tachycardic.


Abdominal exam reveals normal bowel sounds, nontender, no organomegaly.


Extremities are nonedematous and both femoral and pedal pulses are normal.


CNS: Alert and oriented 3.  No focal weakness.





- Constitutional


Vitals: 


                                        











Temp Pulse Resp BP Pulse Ox


 


 98.5 F   125 H  20   141/95   90 


 


 11/17/20 08:25  11/17/20 08:25  11/17/20 08:25  11/17/20 08:25  11/17/20 08:25











General appearance: Present: mild distress, well-nourished





HEART Score





- HEART Score


Troponin: 


                                        











Troponin T  < 0.010 ng/mL (0.00-0.029)   11/10/20  16:09    














Results





- Labs


CBC & Chem 7: 


                                 11/17/20 05:29





                                 11/17/20 05:29


Labs: 


                             Laboratory Last Values











WBC  17.5 K/mm3 (4.5-11.0)  H  11/17/20  05:29    


 


RBC  4.89 M/mm3 (3.65-5.03)   11/17/20  05:29    


 


Hgb  15.0 gm/dl (10.1-14.3)  H  11/17/20  05:29    


 


Hct  45.3 % (30.3-42.9)  H  11/17/20  05:29    


 


MCV  93 fl (79-97)   11/17/20  05:29    


 


MCH  31 pg (28-32)   11/17/20  05:29    


 


MCHC  33 % (30-34)   11/17/20  05:29    


 


RDW  13.7 % (13.2-15.2)   11/17/20  05:29    


 


Plt Count  400 K/mm3 (140-440)   11/17/20  05:29    


 


Lymph % (Auto)  4.8 % (13.4-35.0)  L  11/17/20  05:29    


 


Mono % (Auto)  6.6 % (0.0-7.3)   11/17/20  05:29    


 


Eos % (Auto)  0.0 % (0.0-4.3)   11/17/20  05:29    


 


Baso % (Auto)  0.1 % (0.0-1.8)   11/17/20  05:29    


 


Lymph # (Auto)  0.8 K/mm3 (1.2-5.4)  L  11/17/20  05:29    


 


Mono # (Auto)  1.1 K/mm3 (0.0-0.8)  H  11/17/20  05:29    


 


Eos # (Auto)  0.0 K/mm3 (0.0-0.4)   11/17/20  05:29    


 


Baso # (Auto)  0.0 K/mm3 (0.0-0.1)   11/17/20  05:29    


 


Add Manual Diff  Complete   11/12/20  11:47    


 


Total Counted  100   11/12/20  11:47    


 


Seg Neutrophils %  88.5 % (40.0-70.0)  H  11/17/20  05:29    


 


Seg Neuts % (Manual)  92.0 % (40.0-70.0)  H  11/12/20  11:47    


 


Band Neutrophils %  2.0 %  11/12/20  11:47    


 


Lymphocytes % (Manual)  2.0 % (13.4-35.0)  L  11/12/20  11:47    


 


Reactive Lymphs % (Man)  0 %  11/12/20  11:47    


 


Monocytes % (Manual)  4.0 % (0.0-7.3)   11/12/20  11:47    


 


Eosinophils % (Manual)  0 % (0.0-4.3)   11/12/20  11:47    


 


Basophils % (Manual)  0 % (0.0-1.8)   11/12/20  11:47    


 


Metamyelocytes %  0 %  11/12/20  11:47    


 


Myelocytes %  0 %  11/12/20  11:47    


 


Promyelocytes %  0 %  11/12/20  11:47    


 


Blast Cells %  0 %  11/12/20  11:47    


 


Nucleated RBC %  Not Reportable   11/12/20  11:47    


 


Seg Neutrophils #  15.5 K/mm3 (1.8-7.7)  H  11/17/20  05:29    


 


Seg Neutrophils # Man  20.1 K/mm3 (1.8-7.7)  H  11/12/20  11:47    


 


Band Neutrophils #  0.4 K/mm3  11/12/20  11:47    


 


Lymphocytes # (Manual)  0.4 K/mm3 (1.2-5.4)  L  11/12/20  11:47    


 


Abs React Lymphs (Man)  0.0 K/mm3  11/12/20  11:47    


 


Monocytes # (Manual)  0.9 K/mm3 (0.0-0.8)  H  11/12/20  11:47    


 


Eosinophils # (Manual)  0.0 K/mm3 (0.0-0.4)   11/12/20  11:47    


 


Basophils # (Manual)  0.0 K/mm3 (0.0-0.1)   11/12/20  11:47    


 


Metamyelocytes #  0.0 K/mm3  11/12/20  11:47    


 


Myelocytes #  0.0 K/mm3  11/12/20  11:47    


 


Promyelocytes #  0.0 K/mm3  11/12/20  11:47    


 


Blast Cells #  0.0 K/mm3  11/12/20  11:47    


 


WBC Morphology  Not Reportable   11/12/20  11:47    


 


Hypersegmented Neuts  Not Reportable   11/12/20  11:47    


 


Hyposegmented Neuts  Not Reportable   11/12/20  11:47    


 


Hypogranular Neuts  Not Reportable   11/12/20  11:47    


 


Smudge Cells  Not Reportable   11/12/20  11:47    


 


Toxic Granulation  Not Reportable   11/12/20  11:47    


 


Toxic Vacuolation  Not Reportable   11/12/20  11:47    


 


Dohle Bodies  Not Reportable   11/12/20  11:47    


 


Pelger-Huet Anomaly  Not Reportable   11/12/20  11:47    


 


Guerita Rods  Not Reportable   11/12/20  11:47    


 


Platelet Estimate  Consistent w auto   11/12/20  11:47    


 


Clumped Platelets  Not Reportable   11/12/20  11:47    


 


Plt Clumps, EDTA  Not Reportable   11/12/20  11:47    


 


Large Platelets  Not Reportable   11/12/20  11:47    


 


Giant Platelets  Not Reportable   11/12/20  11:47    


 


Platelet Satelliting  Not Reportable   11/12/20  11:47    


 


Plt Morphology Comment  Not Reportable   11/12/20  11:47    


 


RBC Morphology  Normal   11/12/20  11:47    


 


Dimorphic RBCs  Not Reportable   11/12/20  11:47    


 


Polychromasia  Not Reportable   11/12/20  11:47    


 


Hypochromasia  Not Reportable   11/12/20  11:47    


 


Poikilocytosis  Not Reportable   11/12/20  11:47    


 


Anisocytosis  Not Reportable   11/12/20  11:47    


 


Microcytosis  Not Reportable   11/12/20  11:47    


 


Macrocytosis  Not Reportable   11/12/20  11:47    


 


Spherocytes  Not Reportable   11/12/20  11:47    


 


Pappenheimer Bodies  Not Reportable   11/12/20  11:47    


 


Sickle Cells  Not Reportable   11/12/20  11:47    


 


Target Cells  Not Reportable   11/12/20  11:47    


 


Tear Drop Cells  Not Reportable   11/12/20  11:47    


 


Ovalocytes  Not Reportable   11/12/20  11:47    


 


Helmet Cells  Not Reportable   11/12/20  11:47    


 


Marie-Mellen Bodies  Not Reportable   11/12/20  11:47    


 


Cabot Rings  Not Reportable   11/12/20  11:47    


 


Foster Cells  Not Reportable   11/12/20  11:47    


 


Bite Cells  Not Reportable   11/12/20  11:47    


 


Crenated Cell  Not Reportable   11/12/20  11:47    


 


Elliptocytes  Not Reportable   11/12/20  11:47    


 


Acanthocytes (Spur)  Not Reportable   11/12/20  11:47    


 


Rouleaux  Not Reportable   11/12/20  11:47    


 


Hemoglobin C Crystals  Not Reportable   11/12/20  11:47    


 


Schistocytes  Not Reportable   11/12/20  11:47    


 


Malaria parasites  Not Reportable   11/12/20  11:47    


 


João Bodies  Not Reportable   11/12/20  11:47    


 


Hem Pathologist Commnt  No   11/12/20  11:47    


 


ABG pH  7.458 pH Units (7.350-7.450)  H  11/10/20  17:15    


 


ABG pCO2  34.3 mm Hg  11/10/20  17:15    


 


ABG pO2  70.4 mm Hg (80.0-90.0)  L  11/10/20  17:15    


 


ABG HCO3  23.7 mmol/L (20.0-26.0)   11/10/20  17:15    


 


ABG O2 Saturation  95.8 % (95.0-99.0)   11/10/20  17:15    


 


ABG O2 Content  19.0  (0.0-44)   11/10/20  17:15    


 


ABG Base Excess  0.4 mmol/L (-2.0-3.0)   11/10/20  17:15    


 


ABG Hemoglobin  14.5 gm/dl (12.0-16.0)   11/10/20  17:15    


 


ABG Carboxyhemoglobin  2.1 % (0.0-5.0)   11/10/20  17:15    


 


ABG Methemoglobin  0.5 % (0.0-1.5)   11/10/20  17:15    


 


Oxyhemoglobin  93.3 % (95.0-99.0)  L  11/10/20  17:15    


 


FiO2  21 %  11/10/20  17:15    


 


Sodium  135 mmol/L (137-145)  L  11/17/20  05:29    


 


Potassium  4.7 mmol/L (3.6-5.0)  D 11/17/20  05:29    


 


Chloride  94.9 mmol/L ()  L  11/17/20  05:29    


 


Carbon Dioxide  31 mmol/L (22-30)  H  11/17/20  05:29    


 


Anion Gap  14 mmol/L  11/17/20  05:29    


 


BUN  16 mg/dL (7-17)   11/17/20  05:29    


 


Creatinine  0.5 mg/dL (0.6-1.2)  L  11/17/20  05:29    


 


Estimated GFR  > 60 ml/min  11/17/20  05:29    


 


BUN/Creatinine Ratio  32 %  11/17/20  05:29    


 


Glucose  122 mg/dL ()  H  11/17/20  05:29    


 


Calcium  8.8 mg/dL (8.4-10.2)   11/17/20  05:29    


 


Magnesium  2.00 mg/dL (1.7-2.3)   11/10/20  16:00    


 


Total Bilirubin  0.40 mg/dL (0.1-1.2)   11/10/20  16:09    


 


AST  12 units/L (5-40)   11/10/20  16:09    


 


ALT  11 units/L (7-56)   11/10/20  16:09    


 


Alkaline Phosphatase  74 units/L ()   11/10/20  16:09    


 


Troponin T  < 0.010 ng/mL (0.00-0.029)   11/10/20  16:09    


 


Total Protein  6.5 g/dL (6.3-8.2)   11/10/20  16:09    


 


Albumin  3.5 g/dL (3.9-5)  L  11/10/20  16:09    


 


Albumin/Globulin Ratio  1.2 %  11/10/20  16:09    


 


TSH  2.560 mlU/mL (0.270-4.200)   11/10/20  19:01    


 


Free T4  1.66 ng/dL (0.76-1.46)  H  11/10/20  19:01    











Montilla/IV: 


                                        





Voiding Method                   Toilet


IV Catheter Type [Right          INT / Saline Lock


Forearm]                         


IV Catheter Type [Left Forearm   INT / Saline Lock


]                                


IV Catheter Type [Right          INT / Saline Lock


Antecubital]                     











Active Medications





- Current Medications


Current Medications: 














Generic Name Dose Route Start Last Admin





  Trade Name Freq  PRN Reason Stop Dose Admin


 


Acetaminophen  650 mg  11/10/20 21:44 





  Tylenol  PO  





  Q4H PRN  





  Pain MILD(1-3)/Fever >100.5/HA  


 


Albuterol  2.5 mg  11/10/20 22:17 





  Proventil  IH  





  Q3HRT PRN  





  Wheezing  


 


Albuterol/Ipratropium  1 ampul  11/14/20 20:00  11/17/20 07:56





  Duoneb *Not For Prn Use*  IH   Not Given





  TIDRT TENA  


 


Budesonide  0.5 mg  11/17/20 20:00 





  Pulmicort  IH  





  Q12HRT TENA  


 


Famotidine  10 mg  11/16/20 22:00  11/16/20 21:08





  Pepcid  PO   10 mg





  BID TENA   Administration


 


Hydromorphone HCl  1 mg  11/10/20 21:44  11/16/20 21:08





  Dilaudid  IV   1 mg





  Q3H PRN   Administration





  Pain , Severe (7-10)  


 


Methylprednisolone Sodium Succinate  60 mg  11/13/20 10:00  11/17/20 02:44





  Solu-Medrol  IV   60 mg





  Q8H TENA   Administration


 


Metoclopramide HCl  10 mg  11/10/20 21:44 





  Reglan  IV  





  Q6H PRN  





  Nausea And Vomiting  


 


Ondansetron HCl  4 mg  11/10/20 21:44 





  Zofran  IV  





  Q3H PRN  





  Nausea And Vomiting  


 


Oxycodone/Acetaminophen  1 tab  11/10/20 21:44  11/13/20 22:21





  Percocet 5/325  PO   1 tab





  Q6H PRN   Administration





  Pain, Moderate (4-6)  


 


Sodium Chloride  10 ml  11/10/20 22:00  11/16/20 21:08





  Sodium Chloride Flush Syringe 10 Ml  IV   10 ml





  BID TENA   Administration


 


Sodium Chloride  10 ml  11/10/20 21:44  11/13/20 00:35





  Sodium Chloride Flush Syringe 10 Ml  IV   10 ml





  PRN PRN   Administration





  LINE FLUSH  














Nutrition/Malnutrition Assess





- Dietary Evaluation


Nutrition/Malnutrition Findings: 


                                        





Nutrition Notes                                            Start:  11/12/20 

11:46


Freq:                                                      Status: Active       




Protocol:                                                                       




 Document     11/12/20 11:46  EN  (Rec: 11/12/20 11:58  EN  [id number])


 Co-Sign      11/12/20 11:46  LM


 Nutrition Notes


     Need for Assessment generated from:         RN Referral,MST


     Initial or Follow up                        Assessment


     Other Pertinent Diagnosis                   Pneu, Thyroiditis, bone


                                                 metastasis, liver metastasis,


                                                 lung mass, dyspnea


     Current Diet                                Regular


     Labs/Tests                                  11/10:


                                                 a 132, K+ 3.5, BUN 4, Cr 0.4


     Pertinent Medications                       D5ns at 100 ml/hr


     Height                                      5 ft 2 in


     Weight                                      50 kg


     Ideal Body Weight (kg)                      50.00


     BMI                                         20.1


     Intake Prior to Admission                   Poor


     Weight change and time frame                6% weight loss in 2 weeks


     Weight Status                               Appropriate


     Subjective/Other Information                RD consulted for MST score. Pt


                                                 reports weighing 119 pounds


                                                 at MD office on 10/2/2020 and


                                                 112 pounds at MD office on 10/


                                                 19/2020. Pt reports decreased


                                                 appetite both PTA and during


                                                 admission due to cancer. Pt


                                                 denies N/V/D. Pt states that


                                                 she typically eats one meal


                                                 per day due to decreased


                                                 appetite. Pt declines ONS


                                                 stating that she does not like


                                                 the taste of them. Pt would


                                                 like no eggs during meals and


                                                 prefers biscuits with gravy,


                                                 corn flakes, bananas, fruit


                                                 and salad with ranch dressing.


     Burn                                        Absent


     Trauma                                      Absent


     Food Allergy                                No


     Current % PO                                Poor (25-49%)


     Minimum of two criteria                     Yes


     Energy Intake (non-severe)                  <75% Estimated Energy


                                                 Requirement >7 days


     Interpretation of Weight Loss (severe)      >2% in 1 week


     #1


      Nutrition Diagnosis                        Malnutrition


      Etiology                                   cancer diagnosis


      As Evidenced by Signs and Symptoms         decreased appetite, 6% weight


                                                 loss in 2 weeks and consuming


                                                 only 1 meal per day


     Is patient on ventilator?                   No


     Is Patient Ambulatory and/or Out of Bed     Yes


     REE-([facility]-ambulatory/OOB) [     1343.225


      [email or web address]]                               


     Calculation Used for Recommendations        [facility]


     Additional Notes                            Protein: 1.2-1.5 g/kg (60-75g)


                                                 Fluid: 1 ml/kcal


 Nutrition Intervention


     Change Diet Order:                          Continue regular diet


     Goal #1                                     Meet 75% of energy and protein


                                                 needs


     Anticipated Discharge Needs:                Regular diet


     Follow-Up By:                               11/17/20


     Additional Comments                         F/u for intakes

## 2020-11-17 NOTE — PROGRESS NOTE
Assessment and Plan





Per nursing biopsy today.


Pulmicort today, was not ordered yesterday


Patient refused duoneb this am


Follow up biopsy results





Subjective


Date of service: 11/17/20


Principal diagnosis: Pneumonia and lung cancer with metastasis


Interval history: 





No acute events.  Biopsy did not happen yesterday.  Not sure why.  





Objective


                               Vital Signs - 12hr











  11/16/20 11/17/20 11/17/20





  23:00 04:00 07:56


 


Temperature 97.6 F 97.6 F 


 


Pulse Rate 103 H 103 H 


 


Respiratory  17 





Rate   


 


Blood Pressure 148/102 157/106 





[Right]   


 


O2 Sat by Pulse 94 97 96





Oximetry   











Constitutional: no acute distress


Eyes: non-icteric


ENT: oropharynx moist


Neck: supple, no JVD


Effort: normal


Ascultation: Bilateral: diminished breath sounds


Cardiovascular: regular rate and rhythm


Gastrointestinal: normoactive bowel sounds, soft, non-tender


Integumentary: normal


Extremities: no cyanosis, no edema, pink and warm


Neurologic: normal mental status, non-focal exam


Psychiatric: mood appropriate


CBC and BMP: 


                                 11/17/20 05:29





                                 11/17/20 05:29


ABG, PT/INR, D-dimer: 


ABG











ABG pH  7.458 pH Units (7.350-7.450)  H  11/10/20  17:15    


 


ABG pCO2  34.3 mm Hg  11/10/20  17:15    


 


ABG pO2  70.4 mm Hg (80.0-90.0)  L  11/10/20  17:15    


 


ABG O2 Saturation  95.8 % (95.0-99.0)   11/10/20  17:15    








Abnormal lab findings: 


                                  Abnormal Labs











  11/10/20 11/10/20 11/10/20





  16:09 16:09 17:15


 


WBC  16.0 H  


 


Hgb  14.9 H  


 


Hct  43.9 H  


 


Lymph % (Auto)  13.1 L  


 


Mono % (Auto)  10.0 H  


 


Lymph # (Auto)   


 


Mono # (Auto)  1.6 H  


 


Baso # (Auto)  0.2 H  


 


Seg Neutrophils %  74.2 H  


 


Seg Neuts % (Manual)   


 


Lymphocytes % (Manual)   


 


Seg Neutrophils #  11.9 H  


 


Seg Neutrophils # Man   


 


Lymphocytes # (Manual)   


 


Monocytes # (Manual)   


 


ABG pH    7.458 H


 


ABG pO2    70.4 L


 


Oxyhemoglobin    93.3 L


 


Sodium   132 L 


 


Potassium   3.5 L 


 


Chloride   92.8 L 


 


Carbon Dioxide   


 


BUN   4 L 


 


Creatinine   0.4 L 


 


Glucose   


 


Albumin   3.5 L 


 


Free T4   














  11/10/20 11/12/20 11/12/20





  19:01 11:47 11:47


 


WBC   21.9 H 


 


Hgb   


 


Hct   


 


Lymph % (Auto)   


 


Mono % (Auto)   


 


Lymph # (Auto)   


 


Mono # (Auto)   


 


Baso # (Auto)   


 


Seg Neutrophils %   


 


Seg Neuts % (Manual)   92.0 H 


 


Lymphocytes % (Manual)   2.0 L 


 


Seg Neutrophils #   


 


Seg Neutrophils # Man   20.1 H 


 


Lymphocytes # (Manual)   0.4 L 


 


Monocytes # (Manual)   0.9 H 


 


ABG pH   


 


ABG pO2   


 


Oxyhemoglobin   


 


Sodium    136 L


 


Potassium   


 


Chloride   


 


Carbon Dioxide   


 


BUN   


 


Creatinine    0.5 L


 


Glucose    119 H


 


Albumin   


 


Free T4  1.66 H  














  11/15/20 11/15/20 11/17/20





  04:27 04:27 05:29


 


WBC  15.2 H   17.5 H


 


Hgb  14.8 H   15.0 H


 


Hct  43.4 H   45.3 H


 


Lymph % (Auto)  4.9 L   4.8 L


 


Mono % (Auto)  9.3 H  


 


Lymph # (Auto)  0.7 L   0.8 L


 


Mono # (Auto)  1.4 H   1.1 H


 


Baso # (Auto)   


 


Seg Neutrophils %  85.7 H   88.5 H


 


Seg Neuts % (Manual)   


 


Lymphocytes % (Manual)   


 


Seg Neutrophils #  13.1 H   15.5 H


 


Seg Neutrophils # Man   


 


Lymphocytes # (Manual)   


 


Monocytes # (Manual)   


 


ABG pH   


 


ABG pO2   


 


Oxyhemoglobin   


 


Sodium   135 L 


 


Potassium   


 


Chloride   94.5 L 


 


Carbon Dioxide   


 


BUN   


 


Creatinine   0.4 L 


 


Glucose   112 H 


 


Albumin   


 


Free T4   














  11/17/20





  05:29


 


WBC 


 


Hgb 


 


Hct 


 


Lymph % (Auto) 


 


Mono % (Auto) 


 


Lymph # (Auto) 


 


Mono # (Auto) 


 


Baso # (Auto) 


 


Seg Neutrophils % 


 


Seg Neuts % (Manual) 


 


Lymphocytes % (Manual) 


 


Seg Neutrophils # 


 


Seg Neutrophils # Man 


 


Lymphocytes # (Manual) 


 


Monocytes # (Manual) 


 


ABG pH 


 


ABG pO2 


 


Oxyhemoglobin 


 


Sodium  135 L


 


Potassium 


 


Chloride  94.9 L


 


Carbon Dioxide  31 H


 


BUN 


 


Creatinine  0.5 L


 


Glucose  122 H


 


Albumin 


 


Free T4

## 2020-11-18 LAB
APTT BLD: 22.7 SEC. (ref 24.2–36.6)
BASOPHILS # (AUTO): 0.1 K/MM3 (ref 0–0.1)
BASOPHILS NFR BLD AUTO: 0.3 % (ref 0–1.8)
BUN SERPL-MCNC: 14 MG/DL (ref 7–17)
BUN/CREAT SERPL: 35 %
CALCIUM SERPL-MCNC: 8.4 MG/DL (ref 8.4–10.2)
EOSINOPHIL # BLD AUTO: 0 K/MM3 (ref 0–0.4)
EOSINOPHIL NFR BLD AUTO: 0 % (ref 0–4.3)
HCT VFR BLD CALC: 43.9 % (ref 30.3–42.9)
HEMOLYSIS INDEX: 14
HGB BLD-MCNC: 14.7 GM/DL (ref 10.1–14.3)
INR PPP: 1 (ref 0.87–1.13)
LYMPHOCYTES # BLD AUTO: 1.1 K/MM3 (ref 1.2–5.4)
LYMPHOCYTES NFR BLD AUTO: 5.5 % (ref 13.4–35)
MCHC RBC AUTO-ENTMCNC: 33 % (ref 30–34)
MCV RBC AUTO: 93 FL (ref 79–97)
MONOCYTES # (AUTO): 2.4 K/MM3 (ref 0–0.8)
MONOCYTES % (AUTO): 12.5 % (ref 0–7.3)
PLATELET # BLD: 357 K/MM3 (ref 140–440)
RBC # BLD AUTO: 4.71 M/MM3 (ref 3.65–5.03)

## 2020-11-18 RX ADMIN — METHYLPREDNISOLONE SODIUM SUCCINATE SCH MG: 125 INJECTION, POWDER, FOR SOLUTION INTRAMUSCULAR; INTRAVENOUS at 10:22

## 2020-11-18 RX ADMIN — HYDROMORPHONE HYDROCHLORIDE PRN MG: 1 INJECTION, SOLUTION INTRAMUSCULAR; INTRAVENOUS; SUBCUTANEOUS at 12:23

## 2020-11-18 RX ADMIN — METHYLPREDNISOLONE SODIUM SUCCINATE SCH MG: 125 INJECTION, POWDER, FOR SOLUTION INTRAMUSCULAR; INTRAVENOUS at 03:31

## 2020-11-18 RX ADMIN — HYDROMORPHONE HYDROCHLORIDE PRN MG: 1 INJECTION, SOLUTION INTRAMUSCULAR; INTRAVENOUS; SUBCUTANEOUS at 03:32

## 2020-11-18 RX ADMIN — IPRATROPIUM BROMIDE AND ALBUTEROL SULFATE SCH: .5; 3 SOLUTION RESPIRATORY (INHALATION) at 15:33

## 2020-11-18 RX ADMIN — BUDESONIDE SCH: 0.5 INHALANT RESPIRATORY (INHALATION) at 08:37

## 2020-11-18 RX ADMIN — FAMOTIDINE SCH: 10 TABLET ORAL at 22:48

## 2020-11-18 RX ADMIN — FAMOTIDINE SCH: 10 TABLET ORAL at 10:14

## 2020-11-18 RX ADMIN — METHYLPREDNISOLONE SODIUM SUCCINATE SCH: 125 INJECTION, POWDER, FOR SOLUTION INTRAMUSCULAR; INTRAVENOUS at 21:42

## 2020-11-18 RX ADMIN — Medication SCH ML: at 22:50

## 2020-11-18 RX ADMIN — IPRATROPIUM BROMIDE AND ALBUTEROL SULFATE SCH: .5; 3 SOLUTION RESPIRATORY (INHALATION) at 08:37

## 2020-11-18 RX ADMIN — Medication SCH ML: at 12:28

## 2020-11-18 RX ADMIN — HYDROMORPHONE HYDROCHLORIDE PRN MG: 1 INJECTION, SOLUTION INTRAMUSCULAR; INTRAVENOUS; SUBCUTANEOUS at 20:35

## 2020-11-18 NOTE — PROGRESS NOTE
Assessment and Plan





Change steroids to Prednisone 60 daily and taper as follows.  60 daily for 4 

days, 40 daily for 4 days, 20 daily for 4 days then 10 daily for 4 days then 

stop.


No objection to discharge from pulmonary standpoint.  Can follow up in office f

or full PFT and biopsy results.





Subjective


Date of service: 11/18/20


Principal diagnosis: Pneumonia and lung cancer with metastasis


Interval history: 





Biopsy still not done.  Weaned to room air.





Objective


                               Vital Signs - 12hr











  11/18/20 11/18/20 11/18/20





  03:25 03:32 04:02


 


Temperature 98.2 F  


 


Pulse Rate 24 L  


 


Respiratory 24 20 20





Rate   


 


Blood Pressure   


 


Blood Pressure 159/116  





[Right]   


 


O2 Sat by Pulse 3 L  





Oximetry   














  11/18/20 11/18/20 11/18/20





  05:09 08:12 08:35


 


Temperature 97.8 F 98.2 F 


 


Pulse Rate 100 H 107 H 


 


Respiratory 20 20 





Rate   


 


Blood Pressure 147/99 149/97 


 


Blood Pressure   





[Right]   


 


O2 Sat by Pulse 96 93 94





Oximetry   














  11/18/20





  08:43


 


Temperature 


 


Pulse Rate 100 H


 


Respiratory 





Rate 


 


Blood Pressure 


 


Blood Pressure 





[Right] 


 


O2 Sat by Pulse 





Oximetry 











Constitutional: no acute distress


Eyes: non-icteric


ENT: oropharynx moist


Neck: supple, no JVD


Effort: normal


Ascultation: Bilateral: diminished breath sounds


Cardiovascular: regular rate and rhythm


Gastrointestinal: normoactive bowel sounds, soft, non-tender


Integumentary: normal


Extremities: no cyanosis, no edema, pink and warm


Neurologic: normal mental status, non-focal exam


Psychiatric: mood appropriate


CBC and BMP: 


                                 11/18/20 05:03





                                 11/18/20 05:03


ABG, PT/INR, D-dimer: 


ABG











ABG pH  7.458 pH Units (7.350-7.450)  H  11/10/20  17:15    


 


ABG pCO2  34.3 mm Hg  11/10/20  17:15    


 


ABG pO2  70.4 mm Hg (80.0-90.0)  L  11/10/20  17:15    


 


ABG O2 Saturation  95.8 % (95.0-99.0)   11/10/20  17:15    





PT/INR, D-dimer











PT  13.3 Sec. (12.2-14.9)   11/18/20  10:27    


 


INR  1.00  (0.87-1.13)   11/18/20  10:27    








Abnormal lab findings: 


                                  Abnormal Labs











  11/10/20 11/10/20 11/10/20





  16:09 16:09 17:15


 


WBC  16.0 H  


 


Hgb  14.9 H  


 


Hct  43.9 H  


 


Lymph % (Auto)  13.1 L  


 


Mono % (Auto)  10.0 H  


 


Lymph # (Auto)   


 


Mono # (Auto)  1.6 H  


 


Baso # (Auto)  0.2 H  


 


Seg Neutrophils %  74.2 H  


 


Seg Neuts % (Manual)   


 


Lymphocytes % (Manual)   


 


Seg Neutrophils #  11.9 H  


 


Seg Neutrophils # Man   


 


Lymphocytes # (Manual)   


 


Monocytes # (Manual)   


 


APTT   


 


ABG pH    7.458 H


 


ABG pO2    70.4 L


 


Oxyhemoglobin    93.3 L


 


Sodium   132 L 


 


Potassium   3.5 L 


 


Chloride   92.8 L 


 


Carbon Dioxide   


 


BUN   4 L 


 


Creatinine   0.4 L 


 


Glucose   


 


Albumin   3.5 L 


 


Free T4   














  11/10/20 11/12/20 11/12/20





  19:01 11:47 11:47


 


WBC   21.9 H 


 


Hgb   


 


Hct   


 


Lymph % (Auto)   


 


Mono % (Auto)   


 


Lymph # (Auto)   


 


Mono # (Auto)   


 


Baso # (Auto)   


 


Seg Neutrophils %   


 


Seg Neuts % (Manual)   92.0 H 


 


Lymphocytes % (Manual)   2.0 L 


 


Seg Neutrophils #   


 


Seg Neutrophils # Man   20.1 H 


 


Lymphocytes # (Manual)   0.4 L 


 


Monocytes # (Manual)   0.9 H 


 


APTT   


 


ABG pH   


 


ABG pO2   


 


Oxyhemoglobin   


 


Sodium    136 L


 


Potassium   


 


Chloride   


 


Carbon Dioxide   


 


BUN   


 


Creatinine    0.5 L


 


Glucose    119 H


 


Albumin   


 


Free T4  1.66 H  














  11/15/20 11/15/20 11/17/20





  04:27 04:27 05:29


 


WBC  15.2 H   17.5 H


 


Hgb  14.8 H   15.0 H


 


Hct  43.4 H   45.3 H


 


Lymph % (Auto)  4.9 L   4.8 L


 


Mono % (Auto)  9.3 H  


 


Lymph # (Auto)  0.7 L   0.8 L


 


Mono # (Auto)  1.4 H   1.1 H


 


Baso # (Auto)   


 


Seg Neutrophils %  85.7 H   88.5 H


 


Seg Neuts % (Manual)   


 


Lymphocytes % (Manual)   


 


Seg Neutrophils #  13.1 H   15.5 H


 


Seg Neutrophils # Man   


 


Lymphocytes # (Manual)   


 


Monocytes # (Manual)   


 


APTT   


 


ABG pH   


 


ABG pO2   


 


Oxyhemoglobin   


 


Sodium   135 L 


 


Potassium   


 


Chloride   94.5 L 


 


Carbon Dioxide   


 


BUN   


 


Creatinine   0.4 L 


 


Glucose   112 H 


 


Albumin   


 


Free T4   














  11/17/20 11/18/20 11/18/20





  05:29 05:03 05:03


 


WBC   19.2 H 


 


Hgb   14.7 H 


 


Hct   43.9 H 


 


Lymph % (Auto)   5.5 L 


 


Mono % (Auto)   12.5 H 


 


Lymph # (Auto)   1.1 L 


 


Mono # (Auto)   2.4 H 


 


Baso # (Auto)   


 


Seg Neutrophils %   81.7 H 


 


Seg Neuts % (Manual)   


 


Lymphocytes % (Manual)   


 


Seg Neutrophils #   15.7 H 


 


Seg Neutrophils # Man   


 


Lymphocytes # (Manual)   


 


Monocytes # (Manual)   


 


APTT   


 


ABG pH   


 


ABG pO2   


 


Oxyhemoglobin   


 


Sodium  135 L   134 L


 


Potassium   


 


Chloride  94.9 L   95.3 L


 


Carbon Dioxide  31 H  


 


BUN   


 


Creatinine  0.5 L   0.4 L


 


Glucose  122 H   111 H


 


Albumin   


 


Free T4   














  11/18/20





  10:27


 


WBC 


 


Hgb 


 


Hct 


 


Lymph % (Auto) 


 


Mono % (Auto) 


 


Lymph # (Auto) 


 


Mono # (Auto) 


 


Baso # (Auto) 


 


Seg Neutrophils % 


 


Seg Neuts % (Manual) 


 


Lymphocytes % (Manual) 


 


Seg Neutrophils # 


 


Seg Neutrophils # Man 


 


Lymphocytes # (Manual) 


 


Monocytes # (Manual) 


 


APTT  22.7 L


 


ABG pH 


 


ABG pO2 


 


Oxyhemoglobin 


 


Sodium 


 


Potassium 


 


Chloride 


 


Carbon Dioxide 


 


BUN 


 


Creatinine 


 


Glucose 


 


Albumin 


 


Free T4

## 2020-11-18 NOTE — PROGRESS NOTE
Assessment and Plan


Assessment and plan: 





Sepsis


Patient meets criteria with leukocytosis, tachypnea, tachycardia and diagnosis 

of pneumonia.  Follow-up blood cultures





Pneumonia


Treat as community-acquired pneumonia


Postobstructive pneumonia unlikely





Lung cancer metastatic to bone


Patient needs follow-up with oncology/radiation therapy for possible 

chemotherapy and radiation therapy


Patient counseled about outpatient follow-up.  CT scan revealed metastasis 

including the right hilum, multiple ribs, multiple vertebral bodies, liver and 

likely the right adrenal gland





COPD (chronic obstructive pulmonary disease)


Patient on duo nebs and Solu-Medrol and antibiotics





Hyponatremia


Should correct with IV fluids





Hypokalemia


Supplemented





DVT prophylaxis


On heparin and GI prophylaxis





11/13/2020.  Patient reports dyspnea with minimal exertion.  I broached the 

issue of hospice but the patient would like to continue with aggressive care.  

Consult pulmonary for further evaluation.  Continue O2 to maintain sats greater 

than 92%.





11/14/2020.  Continue IV antibiotics.  Await pulmonary evaluation.  Anticipate 

discharge in a.m. if stable.





11/15/2020.  Pulmonary recommends CT-guided needle biopsy of the right upper 

lobe mass or possible bronchoscopy.  Continue bronchodilators/nebulizers.  

Continue IV antibiotics.





11/16/2020.  Patient has biopsy today, will follow biopsy results.  Patient 

finished a course of IV antibiotics.  Patient currently with 2 L/min satting at 

98%





11/7/2020; patient is supposed to have biopsy today but there is no radiologist 

on site and will be done tomorrow.  Patient is currently on 2 L of oxygen.  My 

feeling of voice is improving.





11/18/2020; patient will have biopsy today.  Continue with 2 L of oxygen.





History


Interval history: 





Patient was seen and evaluated this morning


Patient is on 2 L of oxygen


Patient said her muffling voice is improving





Hospitalist Physical





- Physical exam


Narrative exam: 





Patient is on 2 L of oxygen


 The patient appeared well nourished and normally developed.


 Vital signs as documented.


 Head exam is unremarkable.


 No scleral icterus .


 Neck is without jugular venous distension, thyromegaly, or carotid bruits. 


 Lungs are clear to auscultation.


Cardiac exam reveals regular rate and  Rhythm.  Patient is tachycardic.


Abdominal exam reveals normal bowel sounds, nontender, no organomegaly.


Extremities are nonedematous and both femoral and pedal pulses are normal.


CNS: Alert and oriented 3.  No focal weakness.





- Constitutional


Vitals: 


                                        











Temp Pulse Resp BP Pulse Ox


 


 97.8 F   100 H  20   147/99   96 


 


 11/18/20 05:09  11/18/20 08:43  11/18/20 05:09  11/18/20 05:09  11/18/20 05:09











General appearance: Present: mild distress, well-nourished





HEART Score





- HEART Score


Troponin: 


                                        











Troponin T  < 0.010 ng/mL (0.00-0.029)   11/10/20  16:09    














Results





- Labs


CBC & Chem 7: 


                                 11/18/20 05:03





                                 11/18/20 05:03


Labs: 


                             Laboratory Last Values











WBC  19.2 K/mm3 (4.5-11.0)  H  11/18/20  05:03    


 


RBC  4.71 M/mm3 (3.65-5.03)   11/18/20  05:03    


 


Hgb  14.7 gm/dl (10.1-14.3)  H  11/18/20  05:03    


 


Hct  43.9 % (30.3-42.9)  H  11/18/20  05:03    


 


MCV  93 fl (79-97)   11/18/20  05:03    


 


MCH  31 pg (28-32)   11/18/20  05:03    


 


MCHC  33 % (30-34)   11/18/20  05:03    


 


RDW  13.4 % (13.2-15.2)   11/18/20  05:03    


 


Plt Count  357 K/mm3 (140-440)   11/18/20  05:03    


 


Lymph % (Auto)  5.5 % (13.4-35.0)  L  11/18/20  05:03    


 


Mono % (Auto)  12.5 % (0.0-7.3)  H  11/18/20  05:03    


 


Eos % (Auto)  0.0 % (0.0-4.3)   11/18/20  05:03    


 


Baso % (Auto)  0.3 % (0.0-1.8)   11/18/20  05:03    


 


Lymph # (Auto)  1.1 K/mm3 (1.2-5.4)  L  11/18/20  05:03    


 


Mono # (Auto)  2.4 K/mm3 (0.0-0.8)  H  11/18/20  05:03    


 


Eos # (Auto)  0.0 K/mm3 (0.0-0.4)   11/18/20  05:03    


 


Baso # (Auto)  0.1 K/mm3 (0.0-0.1)   11/18/20  05:03    


 


Add Manual Diff  Complete   11/12/20  11:47    


 


Total Counted  100   11/12/20  11:47    


 


Seg Neutrophils %  81.7 % (40.0-70.0)  H  11/18/20  05:03    


 


Seg Neuts % (Manual)  92.0 % (40.0-70.0)  H  11/12/20  11:47    


 


Band Neutrophils %  2.0 %  11/12/20  11:47    


 


Lymphocytes % (Manual)  2.0 % (13.4-35.0)  L  11/12/20  11:47    


 


Reactive Lymphs % (Man)  0 %  11/12/20  11:47    


 


Monocytes % (Manual)  4.0 % (0.0-7.3)   11/12/20  11:47    


 


Eosinophils % (Manual)  0 % (0.0-4.3)   11/12/20  11:47    


 


Basophils % (Manual)  0 % (0.0-1.8)   11/12/20  11:47    


 


Metamyelocytes %  0 %  11/12/20  11:47    


 


Myelocytes %  0 %  11/12/20  11:47    


 


Promyelocytes %  0 %  11/12/20  11:47    


 


Blast Cells %  0 %  11/12/20  11:47    


 


Nucleated RBC %  Not Reportable   11/12/20  11:47    


 


Seg Neutrophils #  15.7 K/mm3 (1.8-7.7)  H  11/18/20  05:03    


 


Seg Neutrophils # Man  20.1 K/mm3 (1.8-7.7)  H  11/12/20  11:47    


 


Band Neutrophils #  0.4 K/mm3  11/12/20  11:47    


 


Lymphocytes # (Manual)  0.4 K/mm3 (1.2-5.4)  L  11/12/20  11:47    


 


Abs React Lymphs (Man)  0.0 K/mm3  11/12/20  11:47    


 


Monocytes # (Manual)  0.9 K/mm3 (0.0-0.8)  H  11/12/20  11:47    


 


Eosinophils # (Manual)  0.0 K/mm3 (0.0-0.4)   11/12/20  11:47    


 


Basophils # (Manual)  0.0 K/mm3 (0.0-0.1)   11/12/20  11:47    


 


Metamyelocytes #  0.0 K/mm3  11/12/20  11:47    


 


Myelocytes #  0.0 K/mm3  11/12/20  11:47    


 


Promyelocytes #  0.0 K/mm3  11/12/20  11:47    


 


Blast Cells #  0.0 K/mm3  11/12/20  11:47    


 


WBC Morphology  Not Reportable   11/12/20  11:47    


 


Hypersegmented Neuts  Not Reportable   11/12/20  11:47    


 


Hyposegmented Neuts  Not Reportable   11/12/20  11:47    


 


Hypogranular Neuts  Not Reportable   11/12/20  11:47    


 


Smudge Cells  Not Reportable   11/12/20  11:47    


 


Toxic Granulation  Not Reportable   11/12/20  11:47    


 


Toxic Vacuolation  Not Reportable   11/12/20  11:47    


 


Dohle Bodies  Not Reportable   11/12/20  11:47    


 


Pelger-Huet Anomaly  Not Reportable   11/12/20  11:47    


 


Guerita Rods  Not Reportable   11/12/20  11:47    


 


Platelet Estimate  Consistent w auto   11/12/20  11:47    


 


Clumped Platelets  Not Reportable   11/12/20  11:47    


 


Plt Clumps, EDTA  Not Reportable   11/12/20  11:47    


 


Large Platelets  Not Reportable   11/12/20  11:47    


 


Giant Platelets  Not Reportable   11/12/20  11:47    


 


Platelet Satelliting  Not Reportable   11/12/20  11:47    


 


Plt Morphology Comment  Not Reportable   11/12/20  11:47    


 


RBC Morphology  Normal   11/12/20  11:47    


 


Dimorphic RBCs  Not Reportable   11/12/20  11:47    


 


Polychromasia  Not Reportable   11/12/20  11:47    


 


Hypochromasia  Not Reportable   11/12/20  11:47    


 


Poikilocytosis  Not Reportable   11/12/20  11:47    


 


Anisocytosis  Not Reportable   11/12/20  11:47    


 


Microcytosis  Not Reportable   11/12/20  11:47    


 


Macrocytosis  Not Reportable   11/12/20  11:47    


 


Spherocytes  Not Reportable   11/12/20  11:47    


 


Pappenheimer Bodies  Not Reportable   11/12/20  11:47    


 


Sickle Cells  Not Reportable   11/12/20  11:47    


 


Target Cells  Not Reportable   11/12/20  11:47    


 


Tear Drop Cells  Not Reportable   11/12/20  11:47    


 


Ovalocytes  Not Reportable   11/12/20  11:47    


 


Helmet Cells  Not Reportable   11/12/20  11:47    


 


Marie-Dalzell Bodies  Not Reportable   11/12/20  11:47    


 


Cabot Rings  Not Reportable   11/12/20  11:47    


 


Mahogany Cells  Not Reportable   11/12/20  11:47    


 


Bite Cells  Not Reportable   11/12/20  11:47    


 


Crenated Cell  Not Reportable   11/12/20  11:47    


 


Elliptocytes  Not Reportable   11/12/20  11:47    


 


Acanthocytes (Spur)  Not Reportable   11/12/20  11:47    


 


Rouleaux  Not Reportable   11/12/20  11:47    


 


Hemoglobin C Crystals  Not Reportable   11/12/20  11:47    


 


Schistocytes  Not Reportable   11/12/20  11:47    


 


Malaria parasites  Not Reportable   11/12/20  11:47    


 


João Bodies  Not Reportable   11/12/20  11:47    


 


Hem Pathologist Commnt  No   11/12/20  11:47    


 


ABG pH  7.458 pH Units (7.350-7.450)  H  11/10/20  17:15    


 


ABG pCO2  34.3 mm Hg  11/10/20  17:15    


 


ABG pO2  70.4 mm Hg (80.0-90.0)  L  11/10/20  17:15    


 


ABG HCO3  23.7 mmol/L (20.0-26.0)   11/10/20  17:15    


 


ABG O2 Saturation  95.8 % (95.0-99.0)   11/10/20  17:15    


 


ABG O2 Content  19.0  (0.0-44)   11/10/20  17:15    


 


ABG Base Excess  0.4 mmol/L (-2.0-3.0)   11/10/20  17:15    


 


ABG Hemoglobin  14.5 gm/dl (12.0-16.0)   11/10/20  17:15    


 


ABG Carboxyhemoglobin  2.1 % (0.0-5.0)   11/10/20  17:15    


 


ABG Methemoglobin  0.5 % (0.0-1.5)   11/10/20  17:15    


 


Oxyhemoglobin  93.3 % (95.0-99.0)  L  11/10/20  17:15    


 


FiO2  21 %  11/10/20  17:15    


 


Sodium  134 mmol/L (137-145)  L  11/18/20  05:03    


 


Potassium  4.1 mmol/L (3.6-5.0)   11/18/20  05:03    


 


Chloride  95.3 mmol/L ()  L  11/18/20  05:03    


 


Carbon Dioxide  30 mmol/L (22-30)   11/18/20  05:03    


 


Anion Gap  13 mmol/L  11/18/20  05:03    


 


BUN  14 mg/dL (7-17)   11/18/20  05:03    


 


Creatinine  0.4 mg/dL (0.6-1.2)  L  11/18/20  05:03    


 


Estimated GFR  > 60 ml/min  11/18/20  05:03    


 


BUN/Creatinine Ratio  35 %  11/18/20  05:03    


 


Glucose  111 mg/dL ()  H  11/18/20  05:03    


 


Calcium  8.4 mg/dL (8.4-10.2)   11/18/20  05:03    


 


Magnesium  2.00 mg/dL (1.7-2.3)   11/10/20  16:00    


 


Total Bilirubin  0.40 mg/dL (0.1-1.2)   11/10/20  16:09    


 


AST  12 units/L (5-40)   11/10/20  16:09    


 


ALT  11 units/L (7-56)   11/10/20  16:09    


 


Alkaline Phosphatase  74 units/L ()   11/10/20  16:09    


 


Troponin T  < 0.010 ng/mL (0.00-0.029)   11/10/20  16:09    


 


Total Protein  6.5 g/dL (6.3-8.2)   11/10/20  16:09    


 


Albumin  3.5 g/dL (3.9-5)  L  11/10/20  16:09    


 


Albumin/Globulin Ratio  1.2 %  11/10/20  16:09    


 


TSH  2.560 mlU/mL (0.270-4.200)   11/10/20  19:01    


 


Free T4  1.66 ng/dL (0.76-1.46)  H  11/10/20  19:01    











[place]/IV: 


                                        





Voiding Method                   Toilet


IV Catheter Type [Right          INT / Saline Lock


Forearm]                         


IV Catheter Type [Left Forearm   INT / Saline Lock


]                                


IV Catheter Type [Right          INT / Saline Lock


Antecubital]                     











Active Medications





- Current Medications


Current Medications: 














Generic Name Dose Route Start Last Admin





  Trade Name Freq  PRN Reason Stop Dose Admin


 


Acetaminophen  650 mg  11/10/20 21:44 





  Tylenol  PO  





  Q4H PRN  





  Pain MILD(1-3)/Fever >100.5/HA  


 


Albuterol  2.5 mg  11/10/20 22:17 





  Proventil  IH  





  Q3HRT PRN  





  Wheezing  


 


Albuterol/Ipratropium  1 ampul  11/14/20 20:00  11/17/20 20:58





  Duoneb *Not For Prn Use*  IH   Not Given





  TIDRT TENA  


 


Budesonide  0.5 mg  11/17/20 20:00  11/17/20 20:58





  Pulmicort  IH   Not Given





  Q12HRT TENA  


 


Famotidine  10 mg  11/16/20 22:00  11/17/20 22:53





  Pepcid  PO   Not Given





  BID TENA  


 


Hydromorphone HCl  1 mg  11/10/20 21:44  11/18/20 03:32





  Dilaudid  IV   1 mg





  Q3H PRN   Administration





  Pain , Severe (7-10)  


 


Methylprednisolone Sodium Succinate  60 mg  11/13/20 10:00  11/18/20 03:31





  Solu-Medrol  IV   60 mg





  Q8H TENA   Administration


 


Metoclopramide HCl  10 mg  11/10/20 21:44 





  Reglan  IV  





  Q6H PRN  





  Nausea And Vomiting  


 


Ondansetron HCl  4 mg  11/10/20 21:44 





  Zofran  IV  





  Q3H PRN  





  Nausea And Vomiting  


 


Oxycodone/Acetaminophen  1 tab  11/10/20 21:44  11/13/20 22:21





  Percocet 5/325  PO   1 tab





  Q6H PRN   Administration





  Pain, Moderate (4-6)  


 


Sodium Chloride  10 ml  11/10/20 22:00  11/17/20 22:00





  Sodium Chloride Flush Syringe 10 Ml  IV   10 ml





  BID TENA   Administration


 


Sodium Chloride  10 ml  11/10/20 21:44  11/13/20 00:35





  Sodium Chloride Flush Syringe 10 Ml  IV   10 ml





  PRN PRN   Administration





  LINE FLUSH  














Nutrition/Malnutrition Assess





- Dietary Evaluation


Nutrition/Malnutrition Findings: 


                                        





Nutrition Notes                                            Start:  11/12/20 

11:46


Freq:                                                      Status: Active       




Protocol:                                                                       




 Document     11/17/20 11:50  LM  (Rec: 11/17/20 12:04  LM  [id number])


 Nutrition Notes


     Initial or Follow up                        Reassessment


     Other Pertinent Diagnosis                   Pneu, Thyroiditis, bone


                                                 metastasis, liver metastasis,


                                                 lung mass, dyspnea


     Current Diet                                Regular


     Labs/Tests                                  Na 135


     Pertinent Medications                       Solu-Medrol


     Height                                      5 ft 2 in


     Weight                                      62.1 kg


     Ideal Body Weight (kg)                      50.00


     BMI                                         25.0


     Weight change and time frame                Wt change noted. Unsure of


                                                 accuracy


     Weight Status                               Appropriate


     Subjective/Other Information                Pt stated she is eating the


                                                 best she can. Food preferences


                                                 updated.


     Percent of energy/protein needs met:        100%/88%


     Burn                                        Absent


     Trauma                                      Absent


     Food Allergy                                No


     Current % PO                                Fair (50-74%)


     Minimum of two criteria                     Yes


     Energy Intake (non-severe)                  <75% Estimated Energy


                                                 Requirement >7 days


     Interpretation of Weight Loss (severe)      >2% in 1 week


     #1


      Nutrition Diagnosis                        Malnutrition


      Diagnosis Progress(for reassessment        Continues


       documentation)                            


     Is patient on ventilator?                   No


     Is Patient Ambulatory and/or Out of Bed     Yes


     REE-([place]-St. Jeor-ambulatory/OOB) [     1500.525


      [id number]]                               


     Kcal/Kg value to use for calculation        22


     Approximate Energy Requirements Using       1366


      kcal/Kg                                    


     Calculation Used for Recommendations        Kcal/kg


     Additional Notes                            Protein: 1.2-1.5 g/kg (60-75g)


                                                 Fluid: 1 ml/kcal


 Nutrition Intervention


     Change Diet Order:                          Continue regular diet


     Goal #1                                     Meet 75% of energy and protein


                                                 needs


     Anticipated Discharge Needs:                Regular diet


     Follow-Up By:                               11/20/20


     Additional Comments                         F/U for intakes

## 2020-11-19 LAB
BASOPHILS # (AUTO): 0.1 K/MM3 (ref 0–0.1)
BASOPHILS NFR BLD AUTO: 0.3 % (ref 0–1.8)
BUN SERPL-MCNC: 16 MG/DL (ref 7–17)
BUN/CREAT SERPL: 40 %
CALCIUM SERPL-MCNC: 8.5 MG/DL (ref 8.4–10.2)
EOSINOPHIL # BLD AUTO: 0.1 K/MM3 (ref 0–0.4)
EOSINOPHIL NFR BLD AUTO: 0.3 % (ref 0–4.3)
HCT VFR BLD CALC: 42.6 % (ref 30.3–42.9)
HEMOLYSIS INDEX: 7
HGB BLD-MCNC: 14 GM/DL (ref 10.1–14.3)
LYMPHOCYTES # BLD AUTO: 1.1 K/MM3 (ref 1.2–5.4)
LYMPHOCYTES NFR BLD AUTO: 5.4 % (ref 13.4–35)
MCHC RBC AUTO-ENTMCNC: 33 % (ref 30–34)
MCV RBC AUTO: 93 FL (ref 79–97)
MONOCYTES # (AUTO): 2.2 K/MM3 (ref 0–0.8)
MONOCYTES % (AUTO): 10.4 % (ref 0–7.3)
PLATELET # BLD: 353 K/MM3 (ref 140–440)
RBC # BLD AUTO: 4.61 M/MM3 (ref 3.65–5.03)

## 2020-11-19 PROCEDURE — 0BBC3ZX EXCISION OF RIGHT UPPER LUNG LOBE, PERCUTANEOUS APPROACH, DIAGNOSTIC: ICD-10-PCS

## 2020-11-19 RX ADMIN — HYDROMORPHONE HYDROCHLORIDE PRN MG: 1 INJECTION, SOLUTION INTRAMUSCULAR; INTRAVENOUS; SUBCUTANEOUS at 05:08

## 2020-11-19 RX ADMIN — FAMOTIDINE SCH: 10 TABLET ORAL at 11:00

## 2020-11-19 RX ADMIN — ONDANSETRON PRN MG: 2 INJECTION INTRAMUSCULAR; INTRAVENOUS at 14:28

## 2020-11-19 RX ADMIN — HYDROMORPHONE HYDROCHLORIDE PRN MG: 1 INJECTION, SOLUTION INTRAMUSCULAR; INTRAVENOUS; SUBCUTANEOUS at 20:23

## 2020-11-19 RX ADMIN — BUDESONIDE SCH: 0.5 INHALANT RESPIRATORY (INHALATION) at 02:19

## 2020-11-19 RX ADMIN — IPRATROPIUM BROMIDE AND ALBUTEROL SULFATE SCH: .5; 3 SOLUTION RESPIRATORY (INHALATION) at 09:27

## 2020-11-19 RX ADMIN — METHYLPREDNISOLONE SODIUM SUCCINATE SCH MG: 125 INJECTION, POWDER, FOR SOLUTION INTRAMUSCULAR; INTRAVENOUS at 02:40

## 2020-11-19 RX ADMIN — HYDROMORPHONE HYDROCHLORIDE PRN MG: 1 INJECTION, SOLUTION INTRAMUSCULAR; INTRAVENOUS; SUBCUTANEOUS at 14:28

## 2020-11-19 RX ADMIN — BUDESONIDE SCH: 0.5 INHALANT RESPIRATORY (INHALATION) at 21:58

## 2020-11-19 RX ADMIN — FAMOTIDINE SCH MG: 10 TABLET ORAL at 10:59

## 2020-11-19 RX ADMIN — METHYLPREDNISOLONE SODIUM SUCCINATE SCH MG: 125 INJECTION, POWDER, FOR SOLUTION INTRAMUSCULAR; INTRAVENOUS at 17:13

## 2020-11-19 RX ADMIN — IPRATROPIUM BROMIDE AND ALBUTEROL SULFATE SCH: .5; 3 SOLUTION RESPIRATORY (INHALATION) at 02:18

## 2020-11-19 RX ADMIN — IPRATROPIUM BROMIDE AND ALBUTEROL SULFATE SCH: .5; 3 SOLUTION RESPIRATORY (INHALATION) at 21:58

## 2020-11-19 RX ADMIN — BUDESONIDE SCH: 0.5 INHALANT RESPIRATORY (INHALATION) at 09:27

## 2020-11-19 RX ADMIN — IPRATROPIUM BROMIDE AND ALBUTEROL SULFATE SCH: .5; 3 SOLUTION RESPIRATORY (INHALATION) at 14:23

## 2020-11-19 RX ADMIN — METHYLPREDNISOLONE SODIUM SUCCINATE SCH MG: 125 INJECTION, POWDER, FOR SOLUTION INTRAMUSCULAR; INTRAVENOUS at 11:02

## 2020-11-19 RX ADMIN — Medication SCH ML: at 10:59

## 2020-11-19 NOTE — CAT SCAN REPORT
CT-guided right upper lobe mass biopsy



INDICATION : RUL lung mass.



COMPARISON:  CT chest from 11/10/2020



PROCEDURE:  The risks (including but not limited to bleeding and infection) and benefits were explain
ed to the patient and informed consent was obtained.  All CT scans at this location are performed usi
ng CT dose reduction for ALARA by means of automated exposure control. 



A time out procedure was performed.  The procedure site was prepped and draped in the usual sterile f
ashion and lidocaine was used for local anesthesia.  



Under CT guidance and utilizing a right anterior oblique (midaxillary) approach, a 19-gauge coaxial n
eedle was advanced to the peripheral margin of the right upper lobe mass and a 20-gauge biopsy needle
 was utilized to obtain 3 separate samples. The samples were placed directly in formalin and sent to 
pathology for further evaluation. An immediate postprocedure scan showed no pneumothorax or significa
nt hemorrhage.



The patient tolerated the procedure well with no complications.



IMPRESSION: Successful CT-guided biopsy of the right upper lobe mass.



Signer Name: David Chiu MD 

Signed: 11/19/2020 10:27 AM

Workstation Name: [id number]

## 2020-11-19 NOTE — PROGRESS NOTE
Assessment and Plan





No new recs for today.  Please see below.





Change steroids to Prednisone 60 daily and taper as follows.  60 daily for 4 

days, 40 daily for 4 days, 20 daily for 4 days then 10 daily for 4 days then 

stop.


No objection to discharge from pulmonary standpoint.  Can follow up in office 

for full PFT and biopsy results.





Subjective


Date of service: 11/19/20


Principal diagnosis: Pneumonia and lung cancer with metastasis


Interval history: 





Patient had biopsy done today. 





Objective


                               Vital Signs - 12hr











  11/19/20 11/19/20 11/19/20





  02:41 03:53 05:08


 


Temperature  98.5 F 


 


Pulse Rate 99 H 86 


 


Pulse Rate [Pre   





-Procedure]   


 


Respiratory  20 18





Rate   


 


Respiratory   





Rate [Pre-   





Procedure]   


 


Blood Pressure 165/98 150/89 


 


Blood Pressure   





[Pre-Procedure]   


 


O2 Sat by Pulse  97 





Oximetry   


 


O2 Sat by Pulse   





Oximetry [Pre-   





Procedure]   














  11/19/20 11/19/20 11/19/20





  08:34 09:23 09:28


 


Temperature 97.3 F L  


 


Pulse Rate 81  


 


Pulse Rate [Pre  91 H 





-Procedure]   


 


Respiratory 20  12





Rate   


 


Respiratory  10 L 





Rate [Pre-   





Procedure]   


 


Blood Pressure 126/81  


 


Blood Pressure  129/82 





[Pre-Procedure]   


 


O2 Sat by Pulse 97  





Oximetry   


 


O2 Sat by Pulse  98 





Oximetry [Pre-   





Procedure]   











Constitutional: no acute distress


Eyes: non-icteric


ENT: oropharynx moist


Neck: supple, no JVD


Effort: normal


Ascultation: Bilateral: diminished breath sounds


Cardiovascular: regular rate and rhythm


Gastrointestinal: normoactive bowel sounds, soft, non-tender


Integumentary: normal


Extremities: no cyanosis, no edema, pink and warm


Neurologic: normal mental status, non-focal exam


Psychiatric: mood appropriate


CBC and BMP: 


                                 11/19/20 04:33





                                 11/19/20 04:33


ABG, PT/INR, D-dimer: 


ABG











ABG pH  7.458 pH Units (7.350-7.450)  H  11/10/20  17:15    


 


ABG pCO2  34.3 mm Hg  11/10/20  17:15    


 


ABG pO2  70.4 mm Hg (80.0-90.0)  L  11/10/20  17:15    


 


ABG O2 Saturation  95.8 % (95.0-99.0)   11/10/20  17:15    





PT/INR, D-dimer











PT  13.3 Sec. (12.2-14.9)   11/18/20  10:27    


 


INR  1.00  (0.87-1.13)   11/18/20  10:27    








Abnormal lab findings: 


                                  Abnormal Labs











  11/10/20 11/10/20 11/10/20





  16:09 16:09 17:15


 


WBC  16.0 H  


 


Hgb  14.9 H  


 


Hct  43.9 H  


 


Lymph % (Auto)  13.1 L  


 


Mono % (Auto)  10.0 H  


 


Lymph # (Auto)   


 


Mono # (Auto)  1.6 H  


 


Baso # (Auto)  0.2 H  


 


Seg Neutrophils %  74.2 H  


 


Seg Neuts % (Manual)   


 


Lymphocytes % (Manual)   


 


Seg Neutrophils #  11.9 H  


 


Seg Neutrophils # Man   


 


Lymphocytes # (Manual)   


 


Monocytes # (Manual)   


 


APTT   


 


ABG pH    7.458 H


 


ABG pO2    70.4 L


 


Oxyhemoglobin    93.3 L


 


Sodium   132 L 


 


Potassium   3.5 L 


 


Chloride   92.8 L 


 


Carbon Dioxide   


 


BUN   4 L 


 


Creatinine   0.4 L 


 


Glucose   


 


Albumin   3.5 L 


 


Free T4   














  11/10/20 11/12/20 11/12/20





  19:01 11:47 11:47


 


WBC   21.9 H 


 


Hgb   


 


Hct   


 


Lymph % (Auto)   


 


Mono % (Auto)   


 


Lymph # (Auto)   


 


Mono # (Auto)   


 


Baso # (Auto)   


 


Seg Neutrophils %   


 


Seg Neuts % (Manual)   92.0 H 


 


Lymphocytes % (Manual)   2.0 L 


 


Seg Neutrophils #   


 


Seg Neutrophils # Man   20.1 H 


 


Lymphocytes # (Manual)   0.4 L 


 


Monocytes # (Manual)   0.9 H 


 


APTT   


 


ABG pH   


 


ABG pO2   


 


Oxyhemoglobin   


 


Sodium    136 L


 


Potassium   


 


Chloride   


 


Carbon Dioxide   


 


BUN   


 


Creatinine    0.5 L


 


Glucose    119 H


 


Albumin   


 


Free T4  1.66 H  














  11/15/20 11/15/20 11/17/20





  04:27 04:27 05:29


 


WBC  15.2 H   17.5 H


 


Hgb  14.8 H   15.0 H


 


Hct  43.4 H   45.3 H


 


Lymph % (Auto)  4.9 L   4.8 L


 


Mono % (Auto)  9.3 H  


 


Lymph # (Auto)  0.7 L   0.8 L


 


Mono # (Auto)  1.4 H   1.1 H


 


Baso # (Auto)   


 


Seg Neutrophils %  85.7 H   88.5 H


 


Seg Neuts % (Manual)   


 


Lymphocytes % (Manual)   


 


Seg Neutrophils #  13.1 H   15.5 H


 


Seg Neutrophils # Man   


 


Lymphocytes # (Manual)   


 


Monocytes # (Manual)   


 


APTT   


 


ABG pH   


 


ABG pO2   


 


Oxyhemoglobin   


 


Sodium   135 L 


 


Potassium   


 


Chloride   94.5 L 


 


Carbon Dioxide   


 


BUN   


 


Creatinine   0.4 L 


 


Glucose   112 H 


 


Albumin   


 


Free T4   














  11/17/20 11/18/20 11/18/20





  05:29 05:03 05:03


 


WBC   19.2 H 


 


Hgb   14.7 H 


 


Hct   43.9 H 


 


Lymph % (Auto)   5.5 L 


 


Mono % (Auto)   12.5 H 


 


Lymph # (Auto)   1.1 L 


 


Mono # (Auto)   2.4 H 


 


Baso # (Auto)   


 


Seg Neutrophils %   81.7 H 


 


Seg Neuts % (Manual)   


 


Lymphocytes % (Manual)   


 


Seg Neutrophils #   15.7 H 


 


Seg Neutrophils # Man   


 


Lymphocytes # (Manual)   


 


Monocytes # (Manual)   


 


APTT   


 


ABG pH   


 


ABG pO2   


 


Oxyhemoglobin   


 


Sodium  135 L   134 L


 


Potassium   


 


Chloride  94.9 L   95.3 L


 


Carbon Dioxide  31 H  


 


BUN   


 


Creatinine  0.5 L   0.4 L


 


Glucose  122 H   111 H


 


Albumin   


 


Free T4   














  11/18/20 11/19/20 11/19/20





  10:27 04:33 04:33


 


WBC   21.1 H 


 


Hgb   


 


Hct   


 


Lymph % (Auto)   5.4 L 


 


Mono % (Auto)   10.4 H 


 


Lymph # (Auto)   1.1 L 


 


Mono # (Auto)   2.2 H 


 


Baso # (Auto)   


 


Seg Neutrophils %   83.6 H 


 


Seg Neuts % (Manual)   


 


Lymphocytes % (Manual)   


 


Seg Neutrophils #   17.6 H 


 


Seg Neutrophils # Man   


 


Lymphocytes # (Manual)   


 


Monocytes # (Manual)   


 


APTT  22.7 L  


 


ABG pH   


 


ABG pO2   


 


Oxyhemoglobin   


 


Sodium    135 L


 


Potassium   


 


Chloride    96.3 L


 


Carbon Dioxide   


 


BUN   


 


Creatinine    0.4 L


 


Glucose    101 H


 


Albumin   


 


Free T4

## 2020-11-19 NOTE — PROCEDURE NOTE
Date of procedure: 11/19/20


Pre-op diagnosis: Right lung mass


Post-op diagnosis: same


Procedure: 


CT-guided R upper lobe mass biopsy


Findings: 


See report in PACS.


Anesthesia: other (Local with small dose dilaudid and zofran)


Surgeon: AMELIA HEREDIA


Estimated blood loss: minimal


Pathology: list (Cancer workup--cytology, etc.)


Specimen disposition: to lab


Condition: stable


Disposition: floor

## 2020-11-19 NOTE — XRAY REPORT
CHEST 2 VIEWS 



INDICATION: 

post lung biopsy.



COMPARISON: 

11/10/2020



FINDINGS:



SUPPORT DEVICES: None.



HEART: Within normal limits. 



LUNGS/PLEURA: Right upper lobe breast mass again noted. The lungs are otherwise clear with no pneumot
horax. There is a small amount of subcutaneous emphysema over the right superolateral chest wall exte
nding towards the neck.  Calcified breast implants again noted.



ADDITIONAL FINDINGS: None.



IMPRESSION:

1. Small amount of subcutaneous emphysema over the right upper chest wall but otherwise no complicati
on of the right lung such as pneumothorax or pleural effusion following right upper lobe mass biopsy.




Signer Name: David Chiu MD 

Signed: 11/19/2020 1:09 PM

Workstation Name: [id number]

## 2020-11-19 NOTE — PROGRESS NOTE
Assessment and Plan


Assessment and plan: 





Sepsis


Patient meets criteria with leukocytosis, tachypnea, tachycardia and diagnosis 

of pneumonia.  Follow-up blood cultures





Pneumonia


Treat as community-acquired pneumonia


Postobstructive pneumonia unlikely





Lung cancer metastatic to bone


Patient needs follow-up with oncology/radiation therapy for possible 

chemotherapy and radiation therapy


Patient counseled about outpatient follow-up.  CT scan revealed metastasis 

including the right hilum, multiple ribs, multiple vertebral bodies, liver and 

likely the right adrenal gland





COPD (chronic obstructive pulmonary disease)


Patient on duo nebs and Solu-Medrol and antibiotics





Hyponatremia


Should correct with IV fluids





Hypokalemia


Supplemented





DVT prophylaxis


On heparin and GI prophylaxis





11/13/2020.  Patient reports dyspnea with minimal exertion.  I broached the 

issue of hospice but the patient would like to continue with aggressive care.  

Consult pulmonary for further evaluation.  Continue O2 to maintain sats greater 

than 92%.





11/14/2020.  Continue IV antibiotics.  Await pulmonary evaluation.  Anticipate 

discharge in a.m. if stable.





11/15/2020.  Pulmonary recommends CT-guided needle biopsy of the right upper 

lobe mass or possible bronchoscopy.  Continue bronchodilators/nebulizers.  

Continue IV antibiotics.





11/16/2020.  Patient has biopsy today, will follow biopsy results.  Patient 

finished a course of IV antibiotics.  Patient currently with 2 L/min satting at 

98%





11/7/2020; patient is supposed to have biopsy today but there is no radiologist 

on site and will be done tomorrow.  Patient is currently on 2 L of oxygen.  My 

feeling of voice is improving.





11/18/2020; patient will have biopsy today.  Continue with 2 L of oxygen.





11/19/2020; patient will follow for biopsy and PFT as an outpatient in the 

pulmonary office.  Patient is on 2 L of oxygen.  Will do PT OT evaluation and 

home O2 evaluation.  Pulmonary recommends tapering dose of steroid.  Patient 

will have another biopsy today.





History


Interval history: 





Patient was seen and evaluated this morning


Patient is on 2 L of oxygen


Patient said her muffling voice is improving





Hospitalist Physical





- Physical exam


Narrative exam: 





Patient is on 2 L of oxygen


 The patient appeared well nourished and normally developed.


 Vital signs as documented.


 Head exam is unremarkable.


 No scleral icterus .


 Neck is without jugular venous distension, thyromegaly, or carotid bruits. 


 Lungs are clear to auscultation.


Cardiac exam reveals regular rate and  Rhythm.  Patient is tachycardic.


Abdominal exam reveals normal bowel sounds, nontender, no organomegaly.


Extremities are nonedematous and both femoral and pedal pulses are normal.


CNS: Alert and oriented 3.  No focal weakness.





- Constitutional


Vitals: 


                                        











Temp Pulse Resp BP Pulse Ox


 


 97.3 F L  81   20   126/81   97 


 


 11/19/20 08:34  11/19/20 08:34  11/19/20 08:34  11/19/20 08:34  11/19/20 08:34











General appearance: Present: mild distress, well-nourished





HEART Score





- HEART Score


Troponin: 


                                        











Troponin T  < 0.010 ng/mL (0.00-0.029)   11/10/20  16:09    














Results





- Labs


CBC & Chem 7: 


                                 11/19/20 04:33





                                 11/19/20 04:33


Labs: 


                             Laboratory Last Values











WBC  21.1 K/mm3 (4.5-11.0)  H  11/19/20  04:33    


 


RBC  4.61 M/mm3 (3.65-5.03)   11/19/20  04:33    


 


Hgb  14.0 gm/dl (10.1-14.3)   11/19/20  04:33    


 


Hct  42.6 % (30.3-42.9)   11/19/20  04:33    


 


MCV  93 fl (79-97)   11/19/20  04:33    


 


MCH  31 pg (28-32)   11/19/20  04:33    


 


MCHC  33 % (30-34)   11/19/20  04:33    


 


RDW  13.6 % (13.2-15.2)   11/19/20  04:33    


 


Plt Count  353 K/mm3 (140-440)   11/19/20  04:33    


 


Lymph % (Auto)  5.4 % (13.4-35.0)  L  11/19/20  04:33    


 


Mono % (Auto)  10.4 % (0.0-7.3)  H  11/19/20  04:33    


 


Eos % (Auto)  0.3 % (0.0-4.3)   11/19/20  04:33    


 


Baso % (Auto)  0.3 % (0.0-1.8)   11/19/20  04:33    


 


Lymph # (Auto)  1.1 K/mm3 (1.2-5.4)  L  11/19/20  04:33    


 


Mono # (Auto)  2.2 K/mm3 (0.0-0.8)  H  11/19/20  04:33    


 


Eos # (Auto)  0.1 K/mm3 (0.0-0.4)   11/19/20  04:33    


 


Baso # (Auto)  0.1 K/mm3 (0.0-0.1)   11/19/20  04:33    


 


Add Manual Diff  Complete   11/12/20  11:47    


 


Total Counted  100   11/12/20  11:47    


 


Seg Neutrophils %  83.6 % (40.0-70.0)  H  11/19/20  04:33    


 


Seg Neuts % (Manual)  92.0 % (40.0-70.0)  H  11/12/20  11:47    


 


Band Neutrophils %  2.0 %  11/12/20  11:47    


 


Lymphocytes % (Manual)  2.0 % (13.4-35.0)  L  11/12/20  11:47    


 


Reactive Lymphs % (Man)  0 %  11/12/20  11:47    


 


Monocytes % (Manual)  4.0 % (0.0-7.3)   11/12/20  11:47    


 


Eosinophils % (Manual)  0 % (0.0-4.3)   11/12/20  11:47    


 


Basophils % (Manual)  0 % (0.0-1.8)   11/12/20  11:47    


 


Metamyelocytes %  0 %  11/12/20  11:47    


 


Myelocytes %  0 %  11/12/20  11:47    


 


Promyelocytes %  0 %  11/12/20  11:47    


 


Blast Cells %  0 %  11/12/20  11:47    


 


Nucleated RBC %  Not Reportable   11/12/20  11:47    


 


Seg Neutrophils #  17.6 K/mm3 (1.8-7.7)  H  11/19/20  04:33    


 


Seg Neutrophils # Man  20.1 K/mm3 (1.8-7.7)  H  11/12/20  11:47    


 


Band Neutrophils #  0.4 K/mm3  11/12/20  11:47    


 


Lymphocytes # (Manual)  0.4 K/mm3 (1.2-5.4)  L  11/12/20  11:47    


 


Abs React Lymphs (Man)  0.0 K/mm3  11/12/20  11:47    


 


Monocytes # (Manual)  0.9 K/mm3 (0.0-0.8)  H  11/12/20  11:47    


 


Eosinophils # (Manual)  0.0 K/mm3 (0.0-0.4)   11/12/20  11:47    


 


Basophils # (Manual)  0.0 K/mm3 (0.0-0.1)   11/12/20  11:47    


 


Metamyelocytes #  0.0 K/mm3  11/12/20  11:47    


 


Myelocytes #  0.0 K/mm3  11/12/20  11:47    


 


Promyelocytes #  0.0 K/mm3  11/12/20  11:47    


 


Blast Cells #  0.0 K/mm3  11/12/20  11:47    


 


WBC Morphology  Not Reportable   11/12/20  11:47    


 


Hypersegmented Neuts  Not Reportable   11/12/20  11:47    


 


Hyposegmented Neuts  Not Reportable   11/12/20  11:47    


 


Hypogranular Neuts  Not Reportable   11/12/20  11:47    


 


Smudge Cells  Not Reportable   11/12/20  11:47    


 


Toxic Granulation  Not Reportable   11/12/20  11:47    


 


Toxic Vacuolation  Not Reportable   11/12/20  11:47    


 


Dohle Bodies  Not Reportable   11/12/20  11:47    


 


Pelger-Huet Anomaly  Not Reportable   11/12/20  11:47    


 


Guerita Rods  Not Reportable   11/12/20  11:47    


 


Platelet Estimate  Consistent w auto   11/12/20  11:47    


 


Clumped Platelets  Not Reportable   11/12/20  11:47    


 


Plt Clumps, EDTA  Not Reportable   11/12/20  11:47    


 


Large Platelets  Not Reportable   11/12/20  11:47    


 


Giant Platelets  Not Reportable   11/12/20  11:47    


 


Platelet Satelliting  Not Reportable   11/12/20  11:47    


 


Plt Morphology Comment  Not Reportable   11/12/20  11:47    


 


RBC Morphology  Normal   11/12/20  11:47    


 


Dimorphic RBCs  Not Reportable   11/12/20  11:47    


 


Polychromasia  Not Reportable   11/12/20  11:47    


 


Hypochromasia  Not Reportable   11/12/20  11:47    


 


Poikilocytosis  Not Reportable   11/12/20  11:47    


 


Anisocytosis  Not Reportable   11/12/20  11:47    


 


Microcytosis  Not Reportable   11/12/20  11:47    


 


Macrocytosis  Not Reportable   11/12/20  11:47    


 


Spherocytes  Not Reportable   11/12/20  11:47    


 


Pappenheimer Bodies  Not Reportable   11/12/20  11:47    


 


Sickle Cells  Not Reportable   11/12/20  11:47    


 


Target Cells  Not Reportable   11/12/20  11:47    


 


Tear Drop Cells  Not Reportable   11/12/20  11:47    


 


Ovalocytes  Not Reportable   11/12/20  11:47    


 


Helmet Cells  Not Reportable   11/12/20  11:47    


 


Marie-Peabody Bodies  Not Reportable   11/12/20  11:47    


 


Cabot Rings  Not Reportable   11/12/20  11:47    


 


Landenberg Cells  Not Reportable   11/12/20  11:47    


 


Bite Cells  Not Reportable   11/12/20  11:47    


 


Crenated Cell  Not Reportable   11/12/20  11:47    


 


Elliptocytes  Not Reportable   11/12/20  11:47    


 


Acanthocytes (Spur)  Not Reportable   11/12/20  11:47    


 


Rouleaux  Not Reportable   11/12/20  11:47    


 


Hemoglobin C Crystals  Not Reportable   11/12/20  11:47    


 


Schistocytes  Not Reportable   11/12/20  11:47    


 


Malaria parasites  Not Reportable   11/12/20  11:47    


 


João Bodies  Not Reportable   11/12/20  11:47    


 


Hem Pathologist Commnt  No   11/12/20  11:47    


 


PT  13.3 Sec. (12.2-14.9)   11/18/20  10:27    


 


INR  1.00  (0.87-1.13)   11/18/20  10:27    


 


APTT  22.7 Sec. (24.2-36.6)  L  11/18/20  10:27    


 


ABG pH  7.458 pH Units (7.350-7.450)  H  11/10/20  17:15    


 


ABG pCO2  34.3 mm Hg  11/10/20  17:15    


 


ABG pO2  70.4 mm Hg (80.0-90.0)  L  11/10/20  17:15    


 


ABG HCO3  23.7 mmol/L (20.0-26.0)   11/10/20  17:15    


 


ABG O2 Saturation  95.8 % (95.0-99.0)   11/10/20  17:15    


 


ABG O2 Content  19.0  (0.0-44)   11/10/20  17:15    


 


ABG Base Excess  0.4 mmol/L (-2.0-3.0)   11/10/20  17:15    


 


ABG Hemoglobin  14.5 gm/dl (12.0-16.0)   11/10/20  17:15    


 


ABG Carboxyhemoglobin  2.1 % (0.0-5.0)   11/10/20  17:15    


 


ABG Methemoglobin  0.5 % (0.0-1.5)   11/10/20  17:15    


 


Oxyhemoglobin  93.3 % (95.0-99.0)  L  11/10/20  17:15    


 


FiO2  21 %  11/10/20  17:15    


 


Sodium  135 mmol/L (137-145)  L  11/19/20  04:33    


 


Potassium  4.4 mmol/L (3.6-5.0)   11/19/20  04:33    


 


Chloride  96.3 mmol/L ()  L  11/19/20  04:33    


 


Carbon Dioxide  28 mmol/L (22-30)   11/19/20  04:33    


 


Anion Gap  15 mmol/L  11/19/20  04:33    


 


BUN  16 mg/dL (7-17)   11/19/20  04:33    


 


Creatinine  0.4 mg/dL (0.6-1.2)  L  11/19/20  04:33    


 


Estimated GFR  > 60 ml/min  11/19/20  04:33    


 


BUN/Creatinine Ratio  40 %  11/19/20  04:33    


 


Glucose  101 mg/dL ()  H  11/19/20  04:33    


 


Calcium  8.5 mg/dL (8.4-10.2)   11/19/20  04:33    


 


Magnesium  2.00 mg/dL (1.7-2.3)   11/10/20  16:00    


 


Total Bilirubin  0.40 mg/dL (0.1-1.2)   11/10/20  16:09    


 


AST  12 units/L (5-40)   11/10/20  16:09    


 


ALT  11 units/L (7-56)   11/10/20  16:09    


 


Alkaline Phosphatase  74 units/L ()   11/10/20  16:09    


 


Troponin T  < 0.010 ng/mL (0.00-0.029)   11/10/20  16:09    


 


Total Protein  6.5 g/dL (6.3-8.2)   11/10/20  16:09    


 


Albumin  3.5 g/dL (3.9-5)  L  11/10/20  16:09    


 


Albumin/Globulin Ratio  1.2 %  11/10/20  16:09    


 


TSH  2.560 mlU/mL (0.270-4.200)   11/10/20  19:01    


 


Free T4  1.66 ng/dL (0.76-1.46)  H  11/10/20  19:01    


 


Coronavirus (PCR)  Negative  (Negative)   11/18/20  10:00    











Montilla/IV: 


                                        





Voiding Method                   Toilet


IV Catheter Type [Right          INT / Saline Lock


Forearm]                         


IV Catheter Type [Left Forearm   INT / Saline Lock


]                                


IV Catheter Type [Right          INT / Saline Lock


Antecubital]                     











Active Medications





- Current Medications


Current Medications: 














Generic Name Dose Route Start Last Admin





  Trade Name Freq  PRN Reason Stop Dose Admin


 


Acetaminophen  650 mg  11/10/20 21:44 





  Tylenol  PO  





  Q4H PRN  





  Pain MILD(1-3)/Fever >100.5/HA  


 


Albuterol  2.5 mg  11/10/20 22:17 





  Proventil  IH  





  Q3HRT PRN  





  Wheezing  


 


Albuterol/Ipratropium  1 ampul  11/14/20 20:00  11/19/20 02:18





  Duoneb *Not For Prn Use*  IH   Not Given





  TIDRT TENA  


 


Budesonide  0.5 mg  11/17/20 20:00  11/19/20 02:19





  Pulmicort  IH   Not Given





  Q12HRT TENA  


 


Famotidine  10 mg  11/16/20 22:00  11/18/20 22:48





  Pepcid  PO   Not Given





  BID TENA  


 


Hydromorphone HCl  1 mg  11/10/20 21:44  11/19/20 05:08





  Dilaudid  IV   1 mg





  Q3H PRN   Administration





  Pain , Severe (7-10)  


 


Hydromorphone HCl  1 mg  11/19/20 09:00 





  Dilaudid  IV  11/19/20 11:00 





  ONCE NR  


 


Methylprednisolone Sodium Succinate  60 mg  11/13/20 10:00  11/19/20 02:40





  Solu-Medrol  IV   60 mg





  Q8H TENA   Administration


 


Metoclopramide HCl  10 mg  11/10/20 21:44 





  Reglan  IV  





  Q6H PRN  





  Nausea And Vomiting  


 


Ondansetron HCl  4 mg  11/10/20 21:44 





  Zofran  IV  





  Q3H PRN  





  Nausea And Vomiting  


 


Ondansetron HCl  4 mg  11/19/20 08:20 





  Zofran  IV  11/19/20 10:00 





  ONCE NR  


 


Oxycodone/Acetaminophen  1 tab  11/10/20 21:44  11/13/20 22:21





  Percocet 5/325  PO   1 tab





  Q6H PRN   Administration





  Pain, Moderate (4-6)  


 


Sodium Chloride  10 ml  11/10/20 22:00  11/18/20 22:50





  Sodium Chloride Flush Syringe 10 Ml  IV   10 ml





  BID TENA   Administration


 


Sodium Chloride  10 ml  11/10/20 21:44  11/13/20 00:35





  Sodium Chloride Flush Syringe 10 Ml  IV   10 ml





  PRN PRN   Administration





  LINE FLUSH  














Nutrition/Malnutrition Assess





- Dietary Evaluation


Nutrition/Malnutrition Findings: 


                                        





Nutrition Notes                                            Start:  11/12/20 

11:46


Freq:                                                      Status: Active       




Protocol:                                                                       




 Document     11/17/20 11:50  LM  (Rec: 11/17/20 12:04  LM  [id number])


 Nutrition Notes


     Initial or Follow up                        Reassessment


     Other Pertinent Diagnosis                   Pneu, Thyroiditis, bone


                                                 metastasis, liver metastasis,


                                                 lung mass, dyspnea


     Current Diet                                Regular


     Labs/Tests                                  Na 135


     Pertinent Medications                       Solu-Medrol


     Height                                      5 ft 2 in


     Weight                                      62.1 kg


     Ideal Body Weight (kg)                      50.00


     BMI                                         25.0


     Weight change and time frame                Wt change noted. Unsure of


                                                 accuracy


     Weight Status                               Appropriate


     Subjective/Other Information                Pt stated she is eating the


                                                 best she can. Food preferences


                                                 updated.


     Percent of energy/protein needs met:        100%/88%


     Burn                                        Absent


     Trauma                                      Absent


     Food Allergy                                No


     Current % PO                                Fair (50-74%)


     Minimum of two criteria                     Yes


     Energy Intake (non-severe)                  <75% Estimated Energy


                                                 Requirement >7 days


     Interpretation of Weight Loss (severe)      >2% in 1 week


     #1


      Nutrition Diagnosis                        Malnutrition


      Diagnosis Progress(for reassessment        Continues


       documentation)                            


     Is patient on ventilator?                   No


     Is Patient Ambulatory and/or Out of Bed     Yes


     REE-(Gray-St. Jeor-ambulatory/OOB) [     1500.525


      [email or web address]]                               


     Kcal/Kg value to use for calculation        22


     Approximate Energy Requirements Using       1366


      kcal/Kg                                    


     Calculation Used for Recommendations        Kcal/kg


     Additional Notes                            Protein: 1.2-1.5 g/kg (60-75g)


                                                 Fluid: 1 ml/kcal


 Nutrition Intervention


     Change Diet Order:                          Continue regular diet


     Goal #1                                     Meet 75% of energy and protein


                                                 needs


     Anticipated Discharge Needs:                Regular diet


     Follow-Up By:                               11/20/20


     Additional Comments                         F/U for intakes

## 2020-11-20 VITALS — SYSTOLIC BLOOD PRESSURE: 150 MMHG | DIASTOLIC BLOOD PRESSURE: 96 MMHG

## 2020-11-20 RX ADMIN — ONDANSETRON PRN MG: 2 INJECTION INTRAMUSCULAR; INTRAVENOUS at 10:18

## 2020-11-20 RX ADMIN — Medication SCH ML: at 10:29

## 2020-11-20 RX ADMIN — IPRATROPIUM BROMIDE AND ALBUTEROL SULFATE SCH: .5; 3 SOLUTION RESPIRATORY (INHALATION) at 13:38

## 2020-11-20 RX ADMIN — IPRATROPIUM BROMIDE AND ALBUTEROL SULFATE SCH: .5; 3 SOLUTION RESPIRATORY (INHALATION) at 08:05

## 2020-11-20 RX ADMIN — OXYCODONE AND ACETAMINOPHEN PRN TAB: 5; 325 TABLET ORAL at 10:18

## 2020-11-20 RX ADMIN — FAMOTIDINE SCH: 10 TABLET ORAL at 01:43

## 2020-11-20 RX ADMIN — Medication SCH ML: at 01:42

## 2020-11-20 RX ADMIN — FAMOTIDINE SCH MG: 10 TABLET ORAL at 10:18

## 2020-11-20 RX ADMIN — METHYLPREDNISOLONE SODIUM SUCCINATE SCH MG: 125 INJECTION, POWDER, FOR SOLUTION INTRAMUSCULAR; INTRAVENOUS at 01:41

## 2020-11-20 RX ADMIN — HYDROMORPHONE HYDROCHLORIDE PRN MG: 1 INJECTION, SOLUTION INTRAMUSCULAR; INTRAVENOUS; SUBCUTANEOUS at 01:42

## 2020-11-20 RX ADMIN — BUDESONIDE SCH: 0.5 INHALANT RESPIRATORY (INHALATION) at 08:05

## 2020-11-20 RX ADMIN — METHYLPREDNISOLONE SODIUM SUCCINATE SCH MG: 125 INJECTION, POWDER, FOR SOLUTION INTRAMUSCULAR; INTRAVENOUS at 10:29

## 2020-11-20 NOTE — PROGRESS NOTE
Assessment and Plan





No new recs for today.  Please see below.





Change steroids to Prednisone 60 daily and taper as follows.  60 daily for 3 

days, 40 daily for 4 days, 20 daily for 4 days then 10 daily for 4 days then 

stop.


No objection to discharge from pulmonary standpoint.  Can follow up in office 

for full PFT and biopsy results.





Subjective


Date of service: 11/20/20


Principal diagnosis: Pneumonia and lung cancer with metastasis


Interval history: 





No acute events.  Being discharged today.  





Objective


                               Vital Signs - 12hr











  11/20/20 11/20/20 11/20/20





  01:42 05:19 07:43


 


Temperature  97.4 F L 98.2 F


 


Pulse Rate  96 H 98 H


 


Respiratory 20 22 23





Rate   


 


Blood Pressure  149/113 150/96


 


O2 Sat by Pulse  97 95





Oximetry   














  11/20/20 11/20/20





  08:23 10:00


 


Temperature  


 


Pulse Rate  


 


Respiratory  





Rate  


 


Blood Pressure  


 


O2 Sat by Pulse 93 93





Oximetry  











Constitutional: no acute distress


Eyes: non-icteric


ENT: oropharynx moist


Neck: supple, no JVD


Effort: normal


Ascultation: Bilateral: diminished breath sounds


Cardiovascular: regular rate and rhythm


Gastrointestinal: normoactive bowel sounds, soft, non-tender


Integumentary: normal


Extremities: no cyanosis, no edema, pink and warm


Neurologic: normal mental status, non-focal exam


Psychiatric: mood appropriate


CBC and BMP: 


                                 11/19/20 04:33





                                 11/19/20 04:33


ABG, PT/INR, D-dimer: 


ABG











ABG pH  7.458 pH Units (7.350-7.450)  H  11/10/20  17:15    


 


ABG pCO2  34.3 mm Hg  11/10/20  17:15    


 


ABG pO2  70.4 mm Hg (80.0-90.0)  L  11/10/20  17:15    


 


ABG O2 Saturation  95.8 % (95.0-99.0)   11/10/20  17:15    





PT/INR, D-dimer











PT  13.3 Sec. (12.2-14.9)   11/18/20  10:27    


 


INR  1.00  (0.87-1.13)   11/18/20  10:27    








Abnormal lab findings: 


                                  Abnormal Labs











  11/10/20 11/10/20 11/10/20





  16:09 16:09 17:15


 


WBC  16.0 H  


 


Hgb  14.9 H  


 


Hct  43.9 H  


 


Lymph % (Auto)  13.1 L  


 


Mono % (Auto)  10.0 H  


 


Lymph # (Auto)   


 


Mono # (Auto)  1.6 H  


 


Baso # (Auto)  0.2 H  


 


Seg Neutrophils %  74.2 H  


 


Seg Neuts % (Manual)   


 


Lymphocytes % (Manual)   


 


Seg Neutrophils #  11.9 H  


 


Seg Neutrophils # Man   


 


Lymphocytes # (Manual)   


 


Monocytes # (Manual)   


 


APTT   


 


ABG pH    7.458 H


 


ABG pO2    70.4 L


 


Oxyhemoglobin    93.3 L


 


Sodium   132 L 


 


Potassium   3.5 L 


 


Chloride   92.8 L 


 


Carbon Dioxide   


 


BUN   4 L 


 


Creatinine   0.4 L 


 


Glucose   


 


Albumin   3.5 L 


 


Free T4   














  11/10/20 11/12/20 11/12/20





  19:01 11:47 11:47


 


WBC   21.9 H 


 


Hgb   


 


Hct   


 


Lymph % (Auto)   


 


Mono % (Auto)   


 


Lymph # (Auto)   


 


Mono # (Auto)   


 


Baso # (Auto)   


 


Seg Neutrophils %   


 


Seg Neuts % (Manual)   92.0 H 


 


Lymphocytes % (Manual)   2.0 L 


 


Seg Neutrophils #   


 


Seg Neutrophils # Man   20.1 H 


 


Lymphocytes # (Manual)   0.4 L 


 


Monocytes # (Manual)   0.9 H 


 


APTT   


 


ABG pH   


 


ABG pO2   


 


Oxyhemoglobin   


 


Sodium    136 L


 


Potassium   


 


Chloride   


 


Carbon Dioxide   


 


BUN   


 


Creatinine    0.5 L


 


Glucose    119 H


 


Albumin   


 


Free T4  1.66 H  














  11/15/20 11/15/20 11/17/20





  04:27 04:27 05:29


 


WBC  15.2 H   17.5 H


 


Hgb  14.8 H   15.0 H


 


Hct  43.4 H   45.3 H


 


Lymph % (Auto)  4.9 L   4.8 L


 


Mono % (Auto)  9.3 H  


 


Lymph # (Auto)  0.7 L   0.8 L


 


Mono # (Auto)  1.4 H   1.1 H


 


Baso # (Auto)   


 


Seg Neutrophils %  85.7 H   88.5 H


 


Seg Neuts % (Manual)   


 


Lymphocytes % (Manual)   


 


Seg Neutrophils #  13.1 H   15.5 H


 


Seg Neutrophils # Man   


 


Lymphocytes # (Manual)   


 


Monocytes # (Manual)   


 


APTT   


 


ABG pH   


 


ABG pO2   


 


Oxyhemoglobin   


 


Sodium   135 L 


 


Potassium   


 


Chloride   94.5 L 


 


Carbon Dioxide   


 


BUN   


 


Creatinine   0.4 L 


 


Glucose   112 H 


 


Albumin   


 


Free T4   














  11/17/20 11/18/20 11/18/20





  05:29 05:03 05:03


 


WBC   19.2 H 


 


Hgb   14.7 H 


 


Hct   43.9 H 


 


Lymph % (Auto)   5.5 L 


 


Mono % (Auto)   12.5 H 


 


Lymph # (Auto)   1.1 L 


 


Mono # (Auto)   2.4 H 


 


Baso # (Auto)   


 


Seg Neutrophils %   81.7 H 


 


Seg Neuts % (Manual)   


 


Lymphocytes % (Manual)   


 


Seg Neutrophils #   15.7 H 


 


Seg Neutrophils # Man   


 


Lymphocytes # (Manual)   


 


Monocytes # (Manual)   


 


APTT   


 


ABG pH   


 


ABG pO2   


 


Oxyhemoglobin   


 


Sodium  135 L   134 L


 


Potassium   


 


Chloride  94.9 L   95.3 L


 


Carbon Dioxide  31 H  


 


BUN   


 


Creatinine  0.5 L   0.4 L


 


Glucose  122 H   111 H


 


Albumin   


 


Free T4   














  11/18/20 11/19/20 11/19/20





  10:27 04:33 04:33


 


WBC   21.1 H 


 


Hgb   


 


Hct   


 


Lymph % (Auto)   5.4 L 


 


Mono % (Auto)   10.4 H 


 


Lymph # (Auto)   1.1 L 


 


Mono # (Auto)   2.2 H 


 


Baso # (Auto)   


 


Seg Neutrophils %   83.6 H 


 


Seg Neuts % (Manual)   


 


Lymphocytes % (Manual)   


 


Seg Neutrophils #   17.6 H 


 


Seg Neutrophils # Man   


 


Lymphocytes # (Manual)   


 


Monocytes # (Manual)   


 


APTT  22.7 L  


 


ABG pH   


 


ABG pO2   


 


Oxyhemoglobin   


 


Sodium    135 L


 


Potassium   


 


Chloride    96.3 L


 


Carbon Dioxide   


 


BUN   


 


Creatinine    0.4 L


 


Glucose    101 H


 


Albumin   


 


Free T4

## 2020-12-08 ENCOUNTER — HOSPITAL ENCOUNTER (INPATIENT)
Dept: HOSPITAL 5 - ED | Age: 60
LOS: 3 days | Discharge: HOSPICE | DRG: 193 | End: 2020-12-11
Attending: INTERNAL MEDICINE | Admitting: INTERNAL MEDICINE
Payer: COMMERCIAL

## 2020-12-08 DIAGNOSIS — E87.8: ICD-10-CM

## 2020-12-08 DIAGNOSIS — C34.90: ICD-10-CM

## 2020-12-08 DIAGNOSIS — E06.9: ICD-10-CM

## 2020-12-08 DIAGNOSIS — R00.0: ICD-10-CM

## 2020-12-08 DIAGNOSIS — J98.11: ICD-10-CM

## 2020-12-08 DIAGNOSIS — D72.829: ICD-10-CM

## 2020-12-08 DIAGNOSIS — E87.1: ICD-10-CM

## 2020-12-08 DIAGNOSIS — C79.51: ICD-10-CM

## 2020-12-08 DIAGNOSIS — J44.0: ICD-10-CM

## 2020-12-08 DIAGNOSIS — Z66: ICD-10-CM

## 2020-12-08 DIAGNOSIS — J96.01: ICD-10-CM

## 2020-12-08 DIAGNOSIS — J18.9: Primary | ICD-10-CM

## 2020-12-08 DIAGNOSIS — E05.90: ICD-10-CM

## 2020-12-08 DIAGNOSIS — D47.3: ICD-10-CM

## 2020-12-08 LAB
APTT BLD: 25.8 SEC. (ref 24.2–36.6)
BASOPHILS # (AUTO): 0 K/MM3 (ref 0–0.1)
BASOPHILS NFR BLD AUTO: 0.3 % (ref 0–1.8)
BUN SERPL-MCNC: 9 MG/DL (ref 7–17)
BUN/CREAT SERPL: 23 %
CALCIUM SERPL-MCNC: 9.1 MG/DL (ref 8.4–10.2)
EOSINOPHIL # BLD AUTO: 0 K/MM3 (ref 0–0.4)
EOSINOPHIL NFR BLD AUTO: 0.1 % (ref 0–4.3)
HCO3 BLDA-SCNC: 24.2 MMOL/L (ref 20–26)
HCT VFR BLD CALC: 34.2 % (ref 30.3–42.9)
HEMOLYSIS INDEX: 4
HGB BLD-MCNC: 11.5 GM/DL (ref 10.1–14.3)
INR PPP: 1.11 (ref 0.87–1.13)
LYMPHOCYTES # BLD AUTO: 0.9 K/MM3 (ref 1.2–5.4)
LYMPHOCYTES NFR BLD AUTO: 7 % (ref 13.4–35)
MCHC RBC AUTO-ENTMCNC: 34 % (ref 30–34)
MCV RBC AUTO: 93 FL (ref 79–97)
MONOCYTES # (AUTO): 1.4 K/MM3 (ref 0–0.8)
MONOCYTES % (AUTO): 11.3 % (ref 0–7.3)
PCO2 BLDA: 37.9 MM HG
PH BLDA: 7.42 PH UNITS (ref 7.35–7.45)
PLATELET # BLD: 586 K/MM3 (ref 140–440)
PO2 BLDA: 58.1 MM HG (ref 80–90)
RBC # BLD AUTO: 3.69 M/MM3 (ref 3.65–5.03)

## 2020-12-08 PROCEDURE — 71045 X-RAY EXAM CHEST 1 VIEW: CPT

## 2020-12-08 PROCEDURE — 94760 N-INVAS EAR/PLS OXIMETRY 1: CPT

## 2020-12-08 PROCEDURE — 96365 THER/PROPH/DIAG IV INF INIT: CPT

## 2020-12-08 PROCEDURE — 85730 THROMBOPLASTIN TIME PARTIAL: CPT

## 2020-12-08 PROCEDURE — 85027 COMPLETE CBC AUTOMATED: CPT

## 2020-12-08 PROCEDURE — 82803 BLOOD GASES ANY COMBINATION: CPT

## 2020-12-08 PROCEDURE — 36415 COLL VENOUS BLD VENIPUNCTURE: CPT

## 2020-12-08 PROCEDURE — 84443 ASSAY THYROID STIM HORMONE: CPT

## 2020-12-08 PROCEDURE — 93005 ELECTROCARDIOGRAM TRACING: CPT

## 2020-12-08 PROCEDURE — 71275 CT ANGIOGRAPHY CHEST: CPT

## 2020-12-08 PROCEDURE — 85025 COMPLETE CBC W/AUTO DIFF WBC: CPT

## 2020-12-08 PROCEDURE — 86140 C-REACTIVE PROTEIN: CPT

## 2020-12-08 PROCEDURE — 84436 ASSAY OF TOTAL THYROXINE: CPT

## 2020-12-08 PROCEDURE — 87040 BLOOD CULTURE FOR BACTERIA: CPT

## 2020-12-08 PROCEDURE — 90471 IMMUNIZATION ADMIN: CPT

## 2020-12-08 PROCEDURE — 83880 ASSAY OF NATRIURETIC PEPTIDE: CPT

## 2020-12-08 PROCEDURE — 4A033R1 MEASUREMENT OF ARTERIAL SATURATION, PERIPHERAL, PERCUTANEOUS APPROACH: ICD-10-PCS | Performed by: INTERNAL MEDICINE

## 2020-12-08 PROCEDURE — 85610 PROTHROMBIN TIME: CPT

## 2020-12-08 PROCEDURE — 96375 TX/PRO/DX INJ NEW DRUG ADDON: CPT

## 2020-12-08 PROCEDURE — 84481 FREE ASSAY (FT-3): CPT

## 2020-12-08 PROCEDURE — 5A09357 ASSISTANCE WITH RESPIRATORY VENTILATION, LESS THAN 24 CONSECUTIVE HOURS, CONTINUOUS POSITIVE AIRWAY PRESSURE: ICD-10-PCS | Performed by: INTERNAL MEDICINE

## 2020-12-08 PROCEDURE — 80048 BASIC METABOLIC PNL TOTAL CA: CPT

## 2020-12-08 PROCEDURE — 94640 AIRWAY INHALATION TREATMENT: CPT

## 2020-12-08 PROCEDURE — 96376 TX/PRO/DX INJ SAME DRUG ADON: CPT

## 2020-12-08 RX ADMIN — METRONIDAZOLE SCH MLS/HR: 5 INJECTION, SOLUTION INTRAVENOUS at 14:05

## 2020-12-08 RX ADMIN — MORPHINE SULFATE PRN MG: 2 INJECTION, SOLUTION INTRAMUSCULAR; INTRAVENOUS at 13:04

## 2020-12-08 RX ADMIN — AMIODARONE HYDROCHLORIDE SCH MLS/HR: 50 INJECTION, SOLUTION INTRAVENOUS at 13:49

## 2020-12-08 RX ADMIN — BUDESONIDE SCH: 0.5 INHALANT RESPIRATORY (INHALATION) at 12:43

## 2020-12-08 NOTE — EMERGENCY DEPARTMENT REPORT
<RM MORTON - Last Filed: 12/08/20 09:56>





ED Shortness of Breath HPI





- General


Chief Complaint: Dyspnea/Respdistress


Stated Complaint: DIFFICULTY BREATHING


Time Seen by Provider: 12/08/20 02:51





- Related Data


                                  Previous Rx's











 Medication  Instructions  Recorded  Last Taken  Type


 


ALBUTEROL NEB's [Proventil 0.083% 2.5 mg IH Q3HRT PRN #30 nebu 11/20/20 Unknown 

Rx





NEBS]    


 


Budesonide [Pulmicort Respules] 0.5 mg IH Q12HRT #60 nebu 11/20/20 Unknown Rx


 


Ipratropium/Albuterol Sulfate 1 ampul IH TIDRT #90 ampul.neb 11/20/20 Unknown Rx





[DUONEB *Not for PRN Use*]    


 


Nebulizer and Compressor [Bruneau 1 each MC TID #1 each 11/20/20 Unknown Rx





Choice Nebulizer]    


 


oxyCODONE /ACETAMINOPHEN [Percocet 1 tab PO Q6H PRN #14 tablet 11/20/20 Unknown 

Rx





5/325 mg]    


 


predniSONE 10 mg PO QDAY #52 tab 11/20/20 Unknown Rx


 


Acetaminophen [Acetaminophen TAB] 650 mg PO Q4H PRN  tablet 12/10/20 Unknown Rx


 


HYDROcodone/APAP 7.5-325 [Norco 7.5 mg PO Q4H PRN  oral  liquid 12/10/20 Unknown

 Rx





7.5-325 mg per 15 ML]    


 


Magnesium Hydroxide [Milk of 30 ml PO Q4H PRN  oral.liqd 12/10/20 Unknown Rx





Magnesia]    


 


methIMAzole [Tapazole] 5 mg PO Q8HR #90 tablet 12/10/20 Unknown Rx











                                    Allergies











Allergy/AdvReac Type Severity Reaction Status Date / Time


 


No Known Allergies Allergy   Verified 12/08/20 07:25














ED Past Medical Hx





- Medications


Home Medications: 


                                Home Medications











 Medication  Instructions  Recorded  Confirmed  Last Taken  Type


 


ALBUTEROL NEB's [Proventil 0.083% 2.5 mg IH Q3HRT PRN #30 nebu 11/20/20  Unknown

 Rx





NEBS]     


 


Budesonide [Pulmicort Respules] 0.5 mg IH Q12HRT #60 nebu 11/20/20  Unknown Rx


 


Ipratropium/Albuterol Sulfate 1 ampul IH TIDRT #90 ampul.neb 11/20/20  Unknown 

Rx





[DUONEB *Not for PRN Use*]     


 


Nebulizer and Compressor [Bruneau 1 each MC TID #1 each 11/20/20  Unknown Rx





Choice Nebulizer]     


 


oxyCODONE /ACETAMINOPHEN [Percocet 1 tab PO Q6H PRN #14 tablet 11/20/20  Unknown

 Rx





5/325 mg]     


 


predniSONE 10 mg PO QDAY #52 tab 11/20/20  Unknown Rx


 


Acetaminophen [Acetaminophen TAB] 650 mg PO Q4H PRN  tablet 12/10/20  Unknown Rx


 


HYDROcodone/APAP 7.5-325 [Norco 7.5 mg PO Q4H PRN  oral  liquid 12/10/20  

Unknown Rx





7.5-325 mg per 15 ML]     


 


Magnesium Hydroxide [Milk of 30 ml PO Q4H PRN  oral.liqd 12/10/20  Unknown Rx





Magnesia]     


 


methIMAzole [Tapazole] 5 mg PO Q8HR #90 tablet 12/10/20  Unknown Rx














ED Medical Decision Making





- Lab Data


Result diagrams: 


                                 12/08/20 04:29





                                 12/08/20 04:29





- Medical Decision Making


Patient reevaluated in moderate respiratory distress.  BiPAP discontinued, 

patient placed on O2 nasal cannula.  Patient notes she was previously on home 

oxygen, has since been discontinued.  Patient remains tachycardic, thyroid 

studies ordered, metoprolol IV x2 ordered with moderate improvement in heart 

rate.  Patient admitted.





ED Disposition


Clinical Impression: 


 Community acquired pneumonia, Thyroiditis, Hypoxemia, Thyroiditis, acute, 

Pneumonia, Lung cancer metastatic to bone, COPD (chronic obstructive pulmonary 

disease), Acute respiratory failure with hypoxia





Disposition: DC-09 OP ADMIT IP TO THIS HOSP


Is pt being admited?: Yes


Condition: Stable





<CHANDAN DOE - Last Filed: 12/10/20 23:02>





ED Shortness of Breath HPI





- General


Source: EMS, old records reviewed


Mode of arrival: Stretcher


Limitations: No Limitations





- History of Present Illness


Initial Comments: 





60-year-old female with a past medical history of recently diagnosed metastatic 

lung carcinoma with right upper lung mass presents to the hospital complaining 

of shortness of breath for last 2 days.  Last month I admitted patient to the 

hospital for further work-up of new diagnosis of right lung mass with possible 

and she received a biopsy at that time.  She was ultimately discharged on pain 

medications, steroids, and nebulizer treatments.  Patient was not discharged 

home oxygen due to adequate room air saturation.  Patient states the last 2 days

 she has developed more shortness of breath with a mild productive cough.  She 

denies fever and loss of sense of taste or smell.  She tested negative for Covid

 here on November 18.  Patient was offered hospice care during her admission but

 she declined at that time.  She is scheduled for her first outpatient visit 

with Dr. Nuñez in 2 days.  Patient did have 2 CT angiograms last month that were 

negative for pulmonary embolism and confirmed right upper lobe mass with 

metastasis





ED Review of Systems


ROS: 


Stated complaint: DIFFICULTY BREATHING


Other details as noted in HPI





Comment: All other systems reviewed and negative





ED Past Medical Hx





- Past Medical History


Previous Medical History?: Yes


Hx of Cancer: Yes (lung Ca, Liver Ca)





- Surgical History


Past Surgical History?: No





- Social History


Smoking Status: Never Smoker


Substance Use Type: None





ED Physical Exam





- General


Limitations: No Limitations





- Other


Other exam information: 





General: Moderate respiratory distress


Head: Atraumatic


Eyes: normal appearance


ENT: Moist mucous membranes


Neck: Normal appearance, no midline tenderness


Chest: Tachypnea, breathlessness, excessive muscle use, bilateral rhonchi


CV: Tachycardic regular rhythm


Abdomen: Soft, normal bowel sounds, nontender, nondistended, no rebound or 

guarding


Back: Normal inspection


Extremity: Normal inspection, full range of motion


Neuro: Alert O x 3, no facial asymmetry, speech clear, no gross motor sensory 

deficit


Psych: Appropriate behavior


Skin: No rash





ED Course


                                   Vital Signs











  12/08/20 12/08/20 12/08/20





  03:00 03:16 03:30


 


Temperature 94.5 F L  


 


Pulse Rate 158 H 160 H 154 H


 


Pulse Rate [   





Bilateral   





Throughout]   


 


Respiratory 27 H 29 H 26 H





Rate   


 


Respiratory   





Rate [Bilateral   





Throughout]   


 


Blood Pressure 137/96 132/89 132/89


 


Blood Pressure 137/96  





[Left]   


 


O2 Sat by Pulse 100 100 100





Oximetry   














  12/08/20 12/08/20 12/08/20





  03:46 04:00 04:15


 


Temperature   


 


Pulse Rate 149 H 146 H 145 H


 


Pulse Rate [   





Bilateral   





Throughout]   


 


Respiratory 25 H 22 27 H





Rate   


 


Respiratory   





Rate [Bilateral   





Throughout]   


 


Blood Pressure 41/18 112/78 112/66


 


Blood Pressure   





[Left]   


 


O2 Sat by Pulse 100 100 100





Oximetry   














  12/08/20 12/08/20 12/08/20





  04:30 04:46 05:00


 


Temperature   


 


Pulse Rate 144 H 151 H 147 H


 


Pulse Rate [   





Bilateral   





Throughout]   


 


Respiratory 26 H 27 H 23





Rate   


 


Respiratory   





Rate [Bilateral   





Throughout]   


 


Blood Pressure 104/69 104/69 104/69


 


Blood Pressure   





[Left]   


 


O2 Sat by Pulse 100  





Oximetry   














  12/08/20 12/08/20 12/08/20





  05:07 05:16 05:30


 


Temperature   


 


Pulse Rate 145 H 148 H 153 H


 


Pulse Rate [   





Bilateral   





Throughout]   


 


Respiratory 29 H 29 H 29 H





Rate   


 


Respiratory   





Rate [Bilateral   





Throughout]   


 


Blood Pressure  116/71 113/83


 


Blood Pressure   





[Left]   


 


O2 Sat by Pulse 96 99 





Oximetry   














  12/08/20 12/08/20 12/08/20





  05:45 06:00 06:28


 


Temperature   


 


Pulse Rate 151 H 151 H 157 H


 


Pulse Rate [   





Bilateral   





Throughout]   


 


Respiratory 25 H 24 34 H





Rate   


 


Respiratory   





Rate [Bilateral   





Throughout]   


 


Blood Pressure 117/88 119/90 


 


Blood Pressure   





[Left]   


 


O2 Sat by Pulse  100 100





Oximetry   














  12/08/20 12/08/20 12/08/20





  06:30 07:00 07:45


 


Temperature   


 


Pulse Rate 155 H 154 H 153 H


 


Pulse Rate [   





Bilateral   





Throughout]   


 


Respiratory 26 H 25 H 33 H





Rate   


 


Respiratory   





Rate [Bilateral   





Throughout]   


 


Blood Pressure  138/76 117/78


 


Blood Pressure   





[Left]   


 


O2 Sat by Pulse 95 99 100





Oximetry   














  12/08/20 12/08/20 12/08/20





  08:00 08:30 08:47


 


Temperature   


 


Pulse Rate 158 H 129 H 123 H


 


Pulse Rate [   





Bilateral   





Throughout]   


 


Respiratory 28 H 26 H 32 H





Rate   


 


Respiratory   





Rate [Bilateral   





Throughout]   


 


Blood Pressure 117/78 102/75 100/73


 


Blood Pressure   





[Left]   


 


O2 Sat by Pulse 92 100 100





Oximetry   














  12/08/20 12/08/20 12/08/20





  09:00 09:45 10:00


 


Temperature   


 


Pulse Rate 126 H 130 H 98 H


 


Pulse Rate [   





Bilateral   





Throughout]   


 


Respiratory 26 H 29 H 32 H





Rate   


 


Respiratory   





Rate [Bilateral   





Throughout]   


 


Blood Pressure 100/64 98/70 98/70


 


Blood Pressure   





[Left]   


 


O2 Sat by Pulse 100 100 95





Oximetry   














  12/08/20 12/08/20 12/08/20





  10:15 10:45 11:15


 


Temperature   


 


Pulse Rate 125 H 133 H 134 H


 


Pulse Rate [   





Bilateral   





Throughout]   


 


Respiratory 28 H 27 H 25 H





Rate   


 


Respiratory   





Rate [Bilateral   





Throughout]   


 


Blood Pressure 95/63 90/53 100/73


 


Blood Pressure   





[Left]   


 


O2 Sat by Pulse 97 100 100





Oximetry   














  12/08/20 12/08/20 12/08/20





  11:45 12:15 13:00


 


Temperature   


 


Pulse Rate 130 H 131 H 134 H


 


Pulse Rate [   





Bilateral   





Throughout]   


 


Respiratory 26 H 22 26 H





Rate   


 


Respiratory   





Rate [Bilateral   





Throughout]   


 


Blood Pressure 105/79 96/67 112/67


 


Blood Pressure   





[Left]   


 


O2 Sat by Pulse 97 99 97





Oximetry   














  12/08/20 12/08/20 12/08/20





  13:30 14:00 14:30


 


Temperature   


 


Pulse Rate 133 H 131 H 127 H


 


Pulse Rate [   





Bilateral   





Throughout]   


 


Respiratory 24 28 H 26 H





Rate   


 


Respiratory   





Rate [Bilateral   





Throughout]   


 


Blood Pressure 102/78 108/70 97/67


 


Blood Pressure   





[Left]   


 


O2 Sat by Pulse 98 97 97





Oximetry   














  12/08/20 12/08/20 12/08/20





  15:00 15:30 16:30


 


Temperature   


 


Pulse Rate 125 H 130 H 128 H


 


Pulse Rate [   





Bilateral   





Throughout]   


 


Respiratory 28 H 26 H 27 H





Rate   


 


Respiratory   





Rate [Bilateral   





Throughout]   


 


Blood Pressure 104/59 106/76 116/67


 


Blood Pressure   





[Left]   


 


O2 Sat by Pulse 99 99 98





Oximetry   














  12/08/20 12/08/20 12/08/20





  17:00 18:00 18:30


 


Temperature   


 


Pulse Rate 125 H 128 H 128 H


 


Pulse Rate [   





Bilateral   





Throughout]   


 


Respiratory 22 25 H 22





Rate   


 


Respiratory   





Rate [Bilateral   





Throughout]   


 


Blood Pressure 105/49 96/69 100/76


 


Blood Pressure   





[Left]   


 


O2 Sat by Pulse 98 100 99





Oximetry   














  12/08/20 12/08/20 12/08/20





  19:00 19:30 19:40


 


Temperature  97.8 F 


 


Pulse Rate 122 H 129 H 


 


Pulse Rate [   





Bilateral   





Throughout]   


 


Respiratory 25 H 25 H 18





Rate   


 


Respiratory   





Rate [Bilateral   





Throughout]   


 


Blood Pressure 102/66 96/64 


 


Blood Pressure  103/73 





[Left]   


 


O2 Sat by Pulse 98 99 





Oximetry   














  12/08/20 12/08/20 12/08/20





  20:00 20:30 21:00


 


Temperature   


 


Pulse Rate 123 H 128 H 124 H


 


Pulse Rate [   





Bilateral   





Throughout]   


 


Respiratory 21 22 30 H





Rate   


 


Respiratory   





Rate [Bilateral   





Throughout]   


 


Blood Pressure 102/68  96/65


 


Blood Pressure   





[Left]   


 


O2 Sat by Pulse 100 99 100





Oximetry   














  12/08/20 12/08/20 12/08/20





  21:30 22:01 22:30


 


Temperature   


 


Pulse Rate 123 H 131 H 126 H


 


Pulse Rate [   





Bilateral   





Throughout]   


 


Respiratory 26 H 24 24





Rate   


 


Respiratory   





Rate [Bilateral   





Throughout]   


 


Blood Pressure 100/70 107/71 105/73


 


Blood Pressure   





[Left]   


 


O2 Sat by Pulse 99 96 99





Oximetry   














  12/08/20 12/08/20 12/08/20





  22:45 23:15 23:45


 


Temperature   


 


Pulse Rate 127 H 122 H 127 H


 


Pulse Rate [   





Bilateral   





Throughout]   


 


Respiratory 21 32 H 16





Rate   


 


Respiratory   





Rate [Bilateral   





Throughout]   


 


Blood Pressure 92/59 91/56 110/76


 


Blood Pressure   





[Left]   


 


O2 Sat by Pulse 99 99 98





Oximetry   














  12/08/20 12/09/20 12/09/20





  23:55 01:00 01:15


 


Temperature   


 


Pulse Rate 132 H 125 H 117 H


 


Pulse Rate [   





Bilateral   





Throughout]   


 


Respiratory 29 H 28 H 24





Rate   


 


Respiratory   





Rate [Bilateral   





Throughout]   


 


Blood Pressure 107/67 102/72 102/72


 


Blood Pressure   





[Left]   


 


O2 Sat by Pulse 97 100 98





Oximetry   














  12/09/20 12/09/20 12/09/20





  02:00 02:15 02:30


 


Temperature   


 


Pulse Rate 123 H 126 H 124 H


 


Pulse Rate [   





Bilateral   





Throughout]   


 


Respiratory 24 19 16





Rate   


 


Respiratory   





Rate [Bilateral   





Throughout]   


 


Blood Pressure 98/60 108/69 103/68


 


Blood Pressure   





[Left]   


 


O2 Sat by Pulse 96 96 96





Oximetry   














  12/09/20 12/09/20 12/09/20





  02:45 03:00 03:15


 


Temperature   


 


Pulse Rate 125 H 121 H 


 


Pulse Rate [   115 H





Bilateral   





Throughout]   


 


Respiratory 24 26 H 





Rate   


 


Respiratory   20





Rate [Bilateral   





Throughout]   


 


Blood Pressure 100/66 90/59 


 


Blood Pressure   





[Left]   


 


O2 Sat by Pulse 96 98 





Oximetry   














  12/09/20 12/09/20 12/09/20





  03:16 03:45 04:00


 


Temperature   


 


Pulse Rate  121 H 120 H


 


Pulse Rate [   





Bilateral   





Throughout]   


 


Respiratory  18 24





Rate   


 


Respiratory   





Rate [Bilateral   





Throughout]   


 


Blood Pressure  97/68 91/52


 


Blood Pressure   





[Left]   


 


O2 Sat by Pulse 98 96 97





Oximetry   














  12/09/20 12/09/20 12/09/20





  04:15 04:31 04:45


 


Temperature   


 


Pulse Rate 118 H 128 H 128 H


 


Pulse Rate [   





Bilateral   





Throughout]   


 


Respiratory 28 H 29 H 20





Rate   


 


Respiratory   





Rate [Bilateral   





Throughout]   


 


Blood Pressure 91/52 120/81 102/75


 


Blood Pressure   





[Left]   


 


O2 Sat by Pulse 98 95 97





Oximetry   














  12/09/20 12/09/20 12/09/20





  05:00 05:15 05:31


 


Temperature   


 


Pulse Rate 121 H 120 H 119 H


 


Pulse Rate [   





Bilateral   





Throughout]   


 


Respiratory 28 H 55 H 29 H





Rate   


 


Respiratory   





Rate [Bilateral   





Throughout]   


 


Blood Pressure 99/55 95/62 96/57


 


Blood Pressure   





[Left]   


 


O2 Sat by Pulse 97 99 100





Oximetry   














  12/09/20 12/09/20 12/09/20





  05:45 06:01 06:15


 


Temperature   


 


Pulse Rate 129 H 125 H 120 H


 


Pulse Rate [   





Bilateral   





Throughout]   


 


Respiratory 34 H 22 27 H





Rate   


 


Respiratory   





Rate [Bilateral   





Throughout]   


 


Blood Pressure 96/57 108/48 108/48


 


Blood Pressure   





[Left]   


 


O2 Sat by Pulse 98 97 100





Oximetry   














  12/09/20 12/09/20 12/09/20





  06:30 06:45 07:00


 


Temperature   


 


Pulse Rate 118 H 118 H 122 H


 


Pulse Rate [   





Bilateral   





Throughout]   


 


Respiratory 29 H 25 H 23





Rate   


 


Respiratory   





Rate [Bilateral   





Throughout]   


 


Blood Pressure 133/44 133/44 107/66


 


Blood Pressure   





[Left]   


 


O2 Sat by Pulse 100 100 100





Oximetry   














  12/09/20 12/09/20 12/09/20





  07:31 08:00 08:31


 


Temperature   


 


Pulse Rate 119 H 119 H 118 H


 


Pulse Rate [   





Bilateral   





Throughout]   


 


Respiratory 22 22 23





Rate   


 


Respiratory   





Rate [Bilateral   





Throughout]   


 


Blood Pressure 113/80 90/67 89/60


 


Blood Pressure   





[Left]   


 


O2 Sat by Pulse 95 97 99





Oximetry   














  12/09/20 12/09/20 12/09/20





  09:00 09:31 09:38


 


Temperature   


 


Pulse Rate 116 H 114 H 


 


Pulse Rate [   





Bilateral   





Throughout]   


 


Respiratory 22 26 H 





Rate   


 


Respiratory   





Rate [Bilateral   





Throughout]   


 


Blood Pressure 90/58 92/60 


 


Blood Pressure   





[Left]   


 


O2 Sat by Pulse 100 100 97





Oximetry   














  12/09/20 12/09/20 12/09/20





  10:00 10:30 11:01


 


Temperature   


 


Pulse Rate 118 H 115 H 114 H


 


Pulse Rate [   





Bilateral   





Throughout]   


 


Respiratory 20 20 15





Rate   


 


Respiratory   





Rate [Bilateral   





Throughout]   


 


Blood Pressure 89/71 81/58 99/56


 


Blood Pressure   





[Left]   


 


O2 Sat by Pulse 99 99 99





Oximetry   














  12/09/20 12/09/20 12/09/20





  11:30 12:00 12:30


 


Temperature   


 


Pulse Rate 111 H 110 H 111 H


 


Pulse Rate [   





Bilateral   





Throughout]   


 


Respiratory 15 19 22





Rate   


 


Respiratory   





Rate [Bilateral   





Throughout]   


 


Blood Pressure 109/70 92/58 95/62


 


Blood Pressure   





[Left]   


 


O2 Sat by Pulse 97 100 100





Oximetry   














  12/09/20 12/09/20 12/09/20





  13:00 13:31 14:00


 


Temperature   


 


Pulse Rate 118 H 111 H 113 H


 


Pulse Rate [   





Bilateral   





Throughout]   


 


Respiratory 12 19 27 H





Rate   


 


Respiratory   





Rate [Bilateral   





Throughout]   


 


Blood Pressure 107/79 90/57 92/64


 


Blood Pressure   





[Left]   


 


O2 Sat by Pulse 100 100 100





Oximetry   














  12/09/20 12/09/20 12/09/20





  14:31 15:00 15:30


 


Temperature   


 


Pulse Rate 113 H 117 H 114 H


 


Pulse Rate [   





Bilateral   





Throughout]   


 


Respiratory 24 17 34 H





Rate   


 


Respiratory   





Rate [Bilateral   





Throughout]   


 


Blood Pressure 92/64 98/71 88/54


 


Blood Pressure   





[Left]   


 


O2 Sat by Pulse 100 98 100





Oximetry   














  12/09/20





  16:00


 


Temperature 


 


Pulse Rate 113 H


 


Pulse Rate [ 





Bilateral 





Throughout] 


 


Respiratory 20





Rate 


 


Respiratory 





Rate [Bilateral 





Throughout] 


 


Blood Pressure 95/57


 


Blood Pressure 





[Left] 


 


O2 Sat by Pulse 100





Oximetry 














ED Medical Decision Making





- Lab Data


Result diagrams: 


                                 12/09/20 04:59





                                 12/09/20 04:59








                                   Lab Results











  12/08/20 12/08/20 12/08/20 Range/Units





  04:29 04:29 04:29 


 


WBC  12.7 H    (4.5-11.0)  K/mm3


 


RBC  3.69    (3.65-5.03)  M/mm3


 


Hgb  11.5    (10.1-14.3)  gm/dl


 


Hct  34.2    (30.3-42.9)  %


 


MCV  93    (79-97)  fl


 


MCH  31    (28-32)  pg


 


MCHC  34    (30-34)  %


 


RDW  13.9    (13.2-15.2)  %


 


Plt Count  586 H    (140-440)  K/mm3


 


Lymph % (Auto)  7.0 L    (13.4-35.0)  %


 


Mono % (Auto)  11.3 H    (0.0-7.3)  %


 


Eos % (Auto)  0.1    (0.0-4.3)  %


 


Baso % (Auto)  0.3    (0.0-1.8)  %


 


Lymph # (Auto)  0.9 L    (1.2-5.4)  K/mm3


 


Mono # (Auto)  1.4 H    (0.0-0.8)  K/mm3


 


Eos # (Auto)  0.0    (0.0-0.4)  K/mm3


 


Baso # (Auto)  0.0    (0.0-0.1)  K/mm3


 


Seg Neutrophils %  81.3 H    (40.0-70.0)  %


 


Seg Neutrophils #  10.4 H    (1.8-7.7)  K/mm3


 


PT   14.2   (12.2-14.9)  Sec.


 


INR   1.11   (0.87-1.13)  


 


APTT   25.8   (24.2-36.6)  Sec.


 


ABG pH     (7.350-7.450)  pH Units


 


ABG pCO2     mm Hg


 


ABG pO2     (80.0-90.0)  mm Hg


 


ABG HCO3     (20.0-26.0)  mmol/L


 


ABG O2 Saturation     (95.0-99.0)  %


 


ABG O2 Content     (0.0-44)  


 


ABG Base Excess     (-2.0-3.0)  mmol/L


 


ABG Hemoglobin     (12.0-16.0)  gm/dl


 


ABG Carboxyhemoglobin     (0.0-5.0)  %


 


ABG Methemoglobin     (0.0-1.5)  %


 


Oxyhemoglobin     (95.0-99.0)  %


 


FiO2     %


 


Sodium    135 L  (137-145)  mmol/L


 


Potassium    4.0  (3.6-5.0)  mmol/L


 


Chloride    94.7 L  ()  mmol/L


 


Carbon Dioxide    24  (22-30)  mmol/L


 


Anion Gap    20  mmol/L


 


BUN    9  (7-17)  mg/dL


 


Creatinine    0.4 L  (0.6-1.2)  mg/dL


 


Estimated GFR    > 60  ml/min


 


BUN/Creatinine Ratio    23  %


 


Glucose    131 H  ()  mg/dL


 


Calcium    9.1  (8.4-10.2)  mg/dL














  12/08/20 Range/Units





  04:40 


 


WBC   (4.5-11.0)  K/mm3


 


RBC   (3.65-5.03)  M/mm3


 


Hgb   (10.1-14.3)  gm/dl


 


Hct   (30.3-42.9)  %


 


MCV   (79-97)  fl


 


MCH   (28-32)  pg


 


MCHC   (30-34)  %


 


RDW   (13.2-15.2)  %


 


Plt Count   (140-440)  K/mm3


 


Lymph % (Auto)   (13.4-35.0)  %


 


Mono % (Auto)   (0.0-7.3)  %


 


Eos % (Auto)   (0.0-4.3)  %


 


Baso % (Auto)   (0.0-1.8)  %


 


Lymph # (Auto)   (1.2-5.4)  K/mm3


 


Mono # (Auto)   (0.0-0.8)  K/mm3


 


Eos # (Auto)   (0.0-0.4)  K/mm3


 


Baso # (Auto)   (0.0-0.1)  K/mm3


 


Seg Neutrophils %   (40.0-70.0)  %


 


Seg Neutrophils #   (1.8-7.7)  K/mm3


 


PT   (12.2-14.9)  Sec.


 


INR   (0.87-1.13)  


 


APTT   (24.2-36.6)  Sec.


 


ABG pH  7.422  (7.350-7.450)  pH Units


 


ABG pCO2  37.9  mm Hg


 


ABG pO2  58.1 L  (80.0-90.0)  mm Hg


 


ABG HCO3  24.2  (20.0-26.0)  mmol/L


 


ABG O2 Saturation  90.3 L  (95.0-99.0)  %


 


ABG O2 Content  15.1  (0.0-44)  


 


ABG Base Excess  -0.1  (-2.0-3.0)  mmol/L


 


ABG Hemoglobin  12.2  (12.0-16.0)  gm/dl


 


ABG Carboxyhemoglobin  1.5  (0.0-5.0)  %


 


ABG Methemoglobin  0.6  (0.0-1.5)  %


 


Oxyhemoglobin  88.4 L  (95.0-99.0)  %


 


FiO2  21  %


 


Sodium   (137-145)  mmol/L


 


Potassium   (3.6-5.0)  mmol/L


 


Chloride   ()  mmol/L


 


Carbon Dioxide   (22-30)  mmol/L


 


Anion Gap   mmol/L


 


BUN   (7-17)  mg/dL


 


Creatinine   (0.6-1.2)  mg/dL


 


Estimated GFR   ml/min


 


BUN/Creatinine Ratio   %


 


Glucose   ()  mg/dL


 


Calcium   (8.4-10.2)  mg/dL














- EKG Data


-: EKG Interpreted by Me


EKG shows normal: sinus rhythm, ST-T waves (No STEMI)


Rate: tachycardia (156)





- Radiology Data


Radiology results: report reviewed








 CHEST 1 VIEW 





INDICATION / CLINICAL INFORMATION: 


sob,hx of metastatic lung ca. 





COMPARISON: 


Chest radiograph 11/19/2020 and CT chest 11/19/2020 





FINDINGS: 





SUPPORT DEVICES: None. 





HEART / MEDIASTINUM: Nonenlarged. 





LUNGS / PLEURA: Peripheral opacity in the right upper lobe is similar to prior 

examination. 


 However, there has been interval increase of a right hilar opacity with 

spiculated margins, 


 measuring up to approximately 7.7 x 4.5 cm. The left lung is clear. No pleural 

effusion. No 


 pneumothorax. 





ADDITIONAL FINDINGS: Bilateral breast implants. 





IMPRESSION: 


1. Right upper lobe mass is similar to prior, however there has been interval 

increase of a 


 masslike density in the right hilum worrisome for progression of disease. 








- Medical Decision Making





60-year-old female presents to the hospital with recently diagnosed right lung 

mass with metastatic disease who has declined hospice.  She is not undergoing 

any current treatment for cancer.  X-ray reveals worsening right upper lobe 

mass.  ABG on room air reveals hypoxia with a PaO2 less than 60.  Patient placed

 on BiPAP respiratory support due to hypoxia and persistent tachypnea.  Normal 

saline ordered for tachycardia however, PE protocol CT reordered due to 

patient's high risk of pulmonary embolism, persistent tachycardia, hypoxia, and 

tachypnea.  Patient will be signed out to oncoming provider Dr. Morton at 6 AM 

to follow-up on CT angiogram chest and admit patient to the hospitalist service


Critical Care Time: Yes


Critical care time in (mins) excluding proc time.: 35


Critical care attestation.: 


If time is entered above; I have spent that time in minutes in the direct care 

of this critically ill patient, excluding procedure time.

## 2020-12-08 NOTE — CAT SCAN REPORT
CTA CHEST WITH IV CONTRAST



INDICATION / CLINICAL INFORMATION:

P.E. PROTOCOL,  Pt complains of chest pain, Hypoxia,  lung mass.



TECHNIQUE:

Axial CT images were obtained through the chest after injection of 100 mL Omnipaque 350 IV contrast. 
3 plane MIP and/or 3D reconstructions were produced. All CT scans at this location are performed usin
g CT dose reduction for ALARA by means of automated exposure control. 



COMPARISON:

Chest radiograph same day and CTA chest 11/10/2020



FINDINGS:

PULMONARY ARTERIES: The distal right main pulmonary artery is slightly narrowed by the right hilar so
ft tissue mass, similar in appearance to prior examination, but there is no evidence of pulmonary thr
omboembolic disease.

THORACIC AORTA: No significant abnormality. 

HEART: No significant abnormality.

CORONARY ARTERIES: No significant calcification.

PLEURA: Interval development of a small right pleural effusion. No pneumothorax.

LYMPH NODES: No significant adenopathy.

LUNGS: A mass in the peripheral right upper lobe is slightly increased from prior examination, curren
tly measuring up to 5.0 x 4.3 cm, previously 4.1 x 3.4 cm. An infiltrative soft tissue mass in the ri
ght hilum is difficult to precisely measure but also appears slightly increased from prior examinatio
n. This mass results in occlusion of the right mainstem bronchus. There has been interval development
 of postobstructive atelectasis versus pneumonia in the right upper lobe. There is increased interlob
ular septal thickening in the right upper lobe which may reflect lymphangitic spread of tumor. There 
has been interval increase of centrilobular nodularity in the right lower lobe. There is an increased
 10 mm nodule in the right lower lobe (series 2, image 65). An 8 mm left lower lobe pulmonary nodule 
is not significantly changed.



ADDITIONAL FINDINGS: Bilateral breast implants.



UPPER ABDOMEN: A right hepatic mass appears slightly increased compared with prior examination, curre
ntly measuring up to 4.6 cm, previously 3.8 cm. There has been significant interval enlargement of a 
right adrenal mass, currently measuring up to 6.6 cm, previously 3.9 cm.



SKELETAL STRUCTURES: Osseous metastatic disease involving the right scapula, multiple ribs, and multi
ple vertebral bodies does not appear significantly changed from prior examination.



IMPRESSION:

1. No CT evidence for pulmonary embolism. 

2. Findings overall compatible with disease progression with interval enlargement of the right upper 
lobe mass as well as the right hilar mass. The hilar mass results in obstruction of the right upper l
obe bronchus with downstream postobstructive atelectasis versus pneumonia. Increased septal thickenin
g in the right upper lobe is worrisome for lymphangitic spread of tumor.

3. Interval increase of centrilobular nodularity in the right lower lobe, suggesting a superimposed i
nfectious or inflammatory process.

4. Interval development of a small right pleural effusion.

5. Interval enlargement of hepatic and right adrenal metastases. Osseous metastatic disease not signi
ficantly changed.



Signer Name: Cristina Perez MD 

Signed: 12/8/2020 6:53 AM

Workstation Name: [id number]

## 2020-12-08 NOTE — XRAY REPORT
CHEST 1 VIEW 



INDICATION / CLINICAL INFORMATION:

sob,hx of metastatic lung ca.



COMPARISON: 

Chest radiograph 11/19/[address] chest 11/19/2020



FINDINGS:



SUPPORT DEVICES: None.



HEART / MEDIASTINUM: Nonenlarged. 



LUNGS / PLEURA: Peripheral opacity in the right upper lobe is similar to prior examination. However, 
there has been interval increase of a right hilar opacity with spiculated margins, measuring up to ap
proximately 7.7 x 4.5 cm. The left lung is clear. No pleural effusion. No pneumothorax. 



ADDITIONAL FINDINGS: Bilateral breast implants.



IMPRESSION:

1. Right upper lobe mass is similar to prior, however there has been interval increase of a masslike 
density in the right hilum worrisome for progression of disease.



Signer Name: Cristina Perez MD 

Signed: 12/8/2020 3:38 AM

Workstation Name: [id number]

## 2020-12-08 NOTE — HISTORY AND PHYSICAL REPORT
<MONICAOLEG HJoseph - Last Filed: 12/08/20 16:23>





History of Present Illness


Date of admission: 


12/08/20 09:49





Chief complaint: 





I cannot breathe


History of present illness: 


This is a 52-year-old female with recently diagnosed metastatic lung carcinoma 

with right upper lung mass presents to the hospital today complaining of 

shortness of breath and mild nonproductive cough with loss of voice for 2 days. 

Of note patient was offered hospice and therapy such as chemotherapy and 

radiation but she declined.  Patient was said to have a outpatient visit with 

Dr. Nuñez in 2 days. On her last admission CT scan revealed metastasis to the 

multiple  ribs, multiple vertebral bodies, liver and likely right adrenal gland 

and she also had a CT-guided right upper lobe mass biopsy done.  Patient 

encouraged to follow-up with pulmonology and oncology outpatient.  In the 

emergency department patient presented with tachycardia, Leukocytosis, 

thrombocytosis, hypercalcemia and hypoxia.  She received a repeat CTA chest 

which revealed no pulmonary embolism.  She was given 1 L of IV fluid for 

persistent tachycardia and given metoprolol IV x2 which made her hypotensive in 

the emergency department.  Ultimately the attending decided to start her on 

amiodarone and a stat EKG revealed sinus tachycardia. At the time of my 

examination patient stated that she would like her CODE STATUS to be changed to 

AND which was relayed to the attending physician.  Hospice has been consulted.





Past History


Past Medical History: hyperthyroidism, other (Metastatic lung cancer with bone, 

liver and adrenal involvement)


Past Surgical History: No surgical history


Social history: , lives with family, AND/DNR-allow natural death.  

denies: smoking (Former smoker), alcohol abuse, prescription drug abuse, IV drug

use


Family history: other (Unknown)





Medications and Allergies


                                    Allergies











Allergy/AdvReac Type Severity Reaction Status Date / Time


 


No Known Allergies Allergy   Verified 12/08/20 07:25











                                Home Medications











 Medication  Instructions  Recorded  Confirmed  Last Taken  Type


 


ALBUTEROL NEB's [Proventil 0.083% 2.5 mg IH Q3HRT PRN #30 nebu 11/20/20  Unknown

 Rx





NEBS]     


 


Budesonide [Pulmicort Respules] 0.5 mg IH Q12HRT #60 nebu 11/20/20  Unknown Rx


 


Ipratropium/Albuterol Sulfate 1 ampul IH TIDRT #90 ampul.neb 11/20/20  Unknown 

Rx





[DUONEB *Not for PRN Use*]     


 


Nebulizer and Compressor [Happy 1 each MC TID #1 each 11/20/20  Unknown Rx





Choice Nebulizer]     


 


oxyCODONE /ACETAMINOPHEN [Percocet 1 tab PO Q6H PRN #14 tablet 11/20/20  Unknown

 Rx





5/325 mg]     


 


predniSONE 10 mg PO QDAY #52 tab 11/20/20  Unknown Rx











Active Meds: 


Active Medications





Albuterol (Proventil)  2.5 mg IH Q3HRT PRN


   PRN Reason: Wheezing


Budesonide (Pulmicort)  0.5 mg IH Q12HRT [facility]











Review of Systems


Constitutional: weight loss, chills, no weight gain, no fever, no sweats, no 

night sweats (I)


Ears, nose, mouth and throat: no ear pain, no ear discharge, no tinnitis, no 

decreased hearing, no nose pain, no nasal congestion, no epistaxis, no bleeding 

gums, no dental pain, no mouth pain, no dysphagia, no sore throat, no post-nasal

drip


Cardiovascular: palpitations, shortness of breath, no chest pain, no orthopnea, 

no rapid/irregular heart beat, no edema, no syncope, no high blood pressure, no 

leg edema


Respiratory: cough, shortness of breath, dyspnea on exertion, pain on 

inspiration, no cough with sputum, no excessive sputum, no hemoptysis


Gastrointestinal: no abdominal pain, no nausea, no vomiting, no diarrhea, no 

constipation, no change in bowel habits, no hematemesis, no coffee ground 

emesis, no BRBPR, no melena, no hematochezia, no loss of appetite


Genitourinary Female: no pelvic pain, no flank pain, no dysuria, no urinary 

frequency, no urgency


Rectal: no incontinence, no bleeding


Musculoskeletal: no neck stiffness, no neck pain, no shooting arm pain, no arm 

numbness/tingling, no low back pain, no shooting leg pain, no leg numbness/tingl

ing, no redness of joints, no hot joints, no morning stiffness, no muscle 

weakness, no frequent falls, no fractures


Integumentary: no rash, no pruritis, no redness, no sores, no wounds, no 

jaundice, no bullae, no darkening of skin, no depigmentation


Neurological: no transient paralysis, no paralysis, no weakness, no parathesias,

no numbness, no tingling, no seizures, no syncope, no tremors, no ataxia, no 

lack of coordination, no vertigo, no headaches, no migraines, no convulsions, no

aphasia, no change in speech


Psychiatric: no anxiety, no memory loss, no change in sleep habits, no sleep 

disturbances, no insomnia, no hypersomnia, no change in appetite, no change in 

libido, no suicidal ideation, no disorientation, no hallucinations, no 

difficulties concentrating, no irritability


Endocrine: cold intolerance, palpatations, no heat intolerance, no polyphagia, 

no excessive thirst, no polydipsia, no polyuria, no nocturia, no excessive 

sweating, no flushing, no weight change, no increase in ring/shoe/hat size, no 

proptosis, no high blood sugars


Hematologic/Lymphatic: no easy bruising, no easy bleeding


Allergic/Immunologic: no urticaria, no allergic rhinitis, no wheezing





Exam





- Constitutional


Vitals: 


                                        











Temp Pulse Resp BP Pulse Ox


 


 94.5 F L  133 H  27 H  90/53   100 


 


 12/08/20 03:00  12/08/20 10:45  12/08/20 10:45  12/08/20 10:45  12/08/20 10:45











General appearance: Present: severe distress, cachectic, disheveled





- EENT


Eyes: Present: PERRL, EOM intact


ENT: hearing intact, poor dentition





- Neck


Neck: Present: normal ROM





- Respiratory


Respiratory effort: labored, stridor


Respiratory: bilateral: wheezing





- Cardiovascular


Rhythm: regular


Heart Sounds: Present: S1 & S2.  Absent: systolic murmur, diastolic murmur





- Extremities


Extremities: no ischemia, pulses intact, pulses symmetrical, No edema, normal 

temperature, normal color, Full ROM


Peripheral Pulses: within normal limits





- Abdominal


General gastrointestinal: Present: soft, non-tender, non-distended, normal bowel

sounds





- Integumentary


Integumentary: Present: warm, dry, decreased turgor





- Musculoskeletal


Musculoskeletal: strength equal bilaterally





- Psychiatric


Psychiatric: appropriate mood/affect, cooperative





- Neurologic


Neurologic: CNII-XII intact, no focal deficits, moves all extremities





Results





- Labs


CBC & Chem 7: 


                                 12/08/20 04:29





                                 12/08/20 04:29


Labs: 


                             Laboratory Last Values











WBC  12.7 K/mm3 (4.5-11.0)  H  12/08/20  04:29    


 


RBC  3.69 M/mm3 (3.65-5.03)   12/08/20  04:29    


 


Hgb  11.5 gm/dl (10.1-14.3)   12/08/20  04:29    


 


Hct  34.2 % (30.3-42.9)   12/08/20  04:29    


 


MCV  93 fl (79-97)   12/08/20  04:29    


 


MCH  31 pg (28-32)   12/08/20  04:29    


 


MCHC  34 % (30-34)   12/08/20  04:29    


 


RDW  13.9 % (13.2-15.2)   12/08/20  04:29    


 


Plt Count  586 K/mm3 (140-440)  H  12/08/20  04:29    


 


Lymph % (Auto)  7.0 % (13.4-35.0)  L  12/08/20  04:29    


 


Mono % (Auto)  11.3 % (0.0-7.3)  H  12/08/20  04:29    


 


Eos % (Auto)  0.1 % (0.0-4.3)   12/08/20  04:29    


 


Baso % (Auto)  0.3 % (0.0-1.8)   12/08/20  04:29    


 


Lymph # (Auto)  0.9 K/mm3 (1.2-5.4)  L  12/08/20  04:29    


 


Mono # (Auto)  1.4 K/mm3 (0.0-0.8)  H  12/08/20  04:29    


 


Eos # (Auto)  0.0 K/mm3 (0.0-0.4)   12/08/20  04:29    


 


Baso # (Auto)  0.0 K/mm3 (0.0-0.1)   12/08/20  04:29    


 


Seg Neutrophils %  81.3 % (40.0-70.0)  H  12/08/20  04:29    


 


Seg Neutrophils #  10.4 K/mm3 (1.8-7.7)  H  12/08/20  04:29    


 


PT  14.2 Sec. (12.2-14.9)   12/08/20  04:29    


 


INR  1.11  (0.87-1.13)   12/08/20  04:29    


 


APTT  25.8 Sec. (24.2-36.6)   12/08/20  04:29    


 


ABG pH  7.422 pH Units (7.350-7.450)   12/08/20  04:40    


 


ABG pCO2  37.9 mm Hg  12/08/20  04:40    


 


ABG pO2  58.1 mm Hg (80.0-90.0)  L  12/08/20  04:40    


 


ABG HCO3  24.2 mmol/L (20.0-26.0)   12/08/20  04:40    


 


ABG O2 Saturation  90.3 % (95.0-99.0)  L  12/08/20  04:40    


 


ABG O2 Content  15.1  (0.0-44)   12/08/20  04:40    


 


ABG Base Excess  -0.1 mmol/L (-2.0-3.0)   12/08/20  04:40    


 


ABG Hemoglobin  12.2 gm/dl (12.0-16.0)   12/08/20  04:40    


 


ABG Carboxyhemoglobin  1.5 % (0.0-5.0)   12/08/20  04:40    


 


ABG Methemoglobin  0.6 % (0.0-1.5)   12/08/20  04:40    


 


Oxyhemoglobin  88.4 % (95.0-99.0)  L  12/08/20  04:40    


 


FiO2  21 %  12/08/20  04:40    


 


Sodium  135 mmol/L (137-145)  L  12/08/20  04:29    


 


Potassium  4.0 mmol/L (3.6-5.0)   12/08/20  04:29    


 


Chloride  94.7 mmol/L ()  L  12/08/20  04:29    


 


Carbon Dioxide  24 mmol/L (22-30)   12/08/20  04:29    


 


Anion Gap  20 mmol/L  12/08/20  04:29    


 


BUN  9 mg/dL (7-17)   12/08/20  04:29    


 


Creatinine  0.4 mg/dL (0.6-1.2)  L  12/08/20  04:29    


 


Estimated GFR  > 60 ml/min  12/08/20  04:29    


 


BUN/Creatinine Ratio  23 %  12/08/20  04:29    


 


Glucose  131 mg/dL ()  H  12/08/20  04:29    


 


Calcium  9.1 mg/dL (8.4-10.2)   12/08/20  04:29    


 


NT-Pro-B Natriuret Pep  20033 pg/mL (0-900)  H  12/08/20  08:23    


 


TSH  0.013 mlU/mL (0.270-4.200)  L  12/08/20  08:23    


 


Thyroxine (T4)  15.4 ug/dL (4.0-12.0)  H  12/08/20  08:23    











Microbiology: 


Microbiology





12/08/20 08:23   Peripheral/Venous   Blood Culture - Preliminary


                            Culture in Progress


12/08/20 08:23   Peripheral/Venous   Blood Culture - Preliminary


                            Culture in Progress











- Imaging and Cardiology


Chest x-ray: report reviewed, image reviewed


CT scan - chest: report reviewed





- Diagnostic Impressions


Diagnostic Impressions: 


12/8 CXR: 1. Right upper lobe mass is similar to prior, however there has been 

interval increase of a masslike density in the right hilum worrisome for 

progression of disease


12/8 CTA chest: 1. No CT evidence for pulmonary embolism. 2. Findings overall 

compatible with disease progression with interval enlargement of the right upper

lobe mass as well as the right hilar mass. The hilar mass results in obstruction

of the right upper lobe bronchus with downstream postobstructive atelectasis 

versus pneumonia. Increased septal thickening in the right upper lobe is 

worrisome for lymphangitic spread of tumor. 3. Interval increase of 

centrilobular nodularity in the right lower lobe, suggesting a superimposed 

infectious or inflammatory process. 4. Interval development of a small right 

pleural effusion. 5. Interval enlargement of hepatic and right adrenal 

metastases. Osseous metastatic disease not significantly changed. 





Assessment and Plan





- Patient Problems


(1) Community acquired pneumonia


Current Visit: No   Status: Acute   


Plan to address problem: 


12/8 CXR shows.  Upper lobe mass similar to prior however there has been 

interval increase of a masslike density in the right hilar worrisome of 

progression of disease


12/18 CTA shows no evidence of pulmonary embolism, findings compatible with 

disease progression with interval enlargement of right upper lobe mass as well 

as the right hilar mass.  Hilar mass results in obstruction of the right upper 

lobe bronchus with downstream postobstructive atelectasis versus pneumonia, 

increased septal thickening in the right upper lobe worrisome for lymphogenic 

spread of tumor, increase in centrilobular nodularity in the right lower lobe 

suggesting a superimposed infectious inflammatory process, small right pleural 

effusion, enlargement of hepatic and right adrenal metastasis with unchanged 

osseous metastatic disease.


Antibiotic therapy


Supplemental oxygen as needed


Pulmonary hygiene


CXR


Activate pneumonia protocol


12/8 CRP 12.5








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


Current Visit: No   Status: Acute   


Plan to address problem: 


12/8 CXR shows.  Upper lobe mass similar to prior however there has been inte

rval increase of a masslike density in the right hilar worrisome of progression 

of disease


12/18 CTA shows no evidence of pulmonary embolism, findings compatible with 

disease progression with interval enlargement of right upper lobe mass as well 

as the right hilar mass.  Hilar mass results in obstruction of the right upper 

lobe bronchus with downstream postobstructive atelectasis versus pneumonia, 

increased septal thickening in the right upper lobe worrisome for lymphogenic 

spread of tumor, increase in centrilobular nodularity in the right lower lobe 

suggesting a superimposed infectious inflammatory process, small right pleural 

effusion, enlargement of hepatic and right adrenal metastasis with unchanged 

osseous metastatic disease.


On last admission patient declined hospice, radiation, chemotherapy


12/8 patient decided to change her CODE STATUS to DNR/AND and elected hospice 

for further care.


Hospice consult placed


12/8 proBNP 12929








(3) Leukocytosis


Current Visit: Yes   Status: Acute   





(4) Hypochloremia


Current Visit: Yes   Status: Acute   


Plan to address problem: 


Presented with a chloride of 97.4


S/p 1 L IV bolus in the emergency department


S/p 1500 ml IV bolus per hospitalist


Trend BMP








(5) Hyponatremia


Current Visit: No   Status: Acute   


Plan to address problem: 


Presented with a sodium of 135


S/p 1 L IV bolus in the emergency department


S/p 1500 ml IV bolus per hospitalist


Trend BMP








(6) Thyroiditis


Current Visit: No   Status: Acute   


Plan to address problem: 


12/8 TSH 0.013, thyroxine 15.4


S/p 5 mg IV push metoprolol in the emergency department


RN went to go push the second 5 mg of metoprolol ordered and patient went 

hypotensive with a map of 65 and systolic blood pressure of 90








(7) DVT prophylaxis


Current Visit: No   Status: Acute   


Plan to address problem: 


SCD to bilateral extremities while in bed


Heparin subcu








(8) DNR no code (do not resuscitate)


Current Visit: Yes   Status: Acute   


Plan to address problem: 


Patient decided to be a DNR/AND








<NIKIA DACOSTA R - Last Filed: 12/09/20 10:43>





History of Present Illness


Date of examination: 12/08/20


Date of admission: 


12/08/20 09:49








Medications and Allergies


Active Meds: 


Active Medications





Acetaminophen (Tylenol)  650 mg PO Q4H PRN


   PRN Reason: Pain MILD(1-3)/Fever >100.5/HA


Albuterol (Proventil)  2.5 mg IH Q3HRT PRN


   PRN Reason: Wheezing


Budesonide (Pulmicort)  0.5 mg IH Q12HRT TNEA


   Last Admin: 12/09/20 10:18 Dose:  Not Given


   Documented by: 


Heparin Sodium (Porcine) (Heparin)  5,000 unit SUB-Q Q12HR TENA


   Last Admin: 12/09/20 10:18 Dose:  5,000 unit


   Documented by: 


Metronidazole (Flagyl 500 Mg/100 Ml)  500 mg in 100 mls @ 100 mls/hr IV Q8HR 

TENA; Protocol


   Last Admin: 12/09/20 06:04 Dose:  Not Given


   Documented by: 


Ceftriaxone Sodium (Rocephin/Ns 2 Gm/100 Ml)  2 gm in 100 mls @ 200 mls/hr IV 

Q24HR TENA; Protocol


   Last Admin: 12/09/20 10:18 Dose:  200 mls/hr


   Documented by: 


Azithromycin 500 mg/ Sodium (Chloride)  250 mls @ 250 mls/hr IV Q24HR TENA; 

Protocol


   Stop: 12/12/20 10:59


Dextrose/Sodium Chloride (D5ns)  1,000 mls @ 100 mls/hr IV AS DIRECT TENA


   Last Admin: 12/09/20 05:38 Dose:  100 mls/hr


   Documented by: 


Amiodarone HCl 900 mg/ (Dextrose)  500 mls @ 33.333 mls/hr IV AS DIRECT TENA; Pro

tocol


   Last Titration: 12/08/20 19:50 Dose:  0.5 mg/min, 16.667 mls/hr


   Documented by: 


Magnesium Hydroxide (Milk Of Magnesia)  30 ml PO Q4H PRN


   PRN Reason: Constipation


Morphine Sulfate (Morphine)  2 mg IV Q4H PRN


   PRN Reason: Pain, Moderate (4-6)


   Last Admin: 12/09/20 08:00 Dose:  2 mg


   Documented by: 


Ondansetron HCl (Zofran)  4 mg IV Q8H PRN


   PRN Reason: Nausea And Vomiting


Oxycodone/Acetaminophen (Percocet 5/325)  1 tab PO Q6H PRN


   PRN Reason: Pain, Moderate (4-6)


Sodium Chloride (Sodium Chloride Flush Syringe 10 Ml)  10 ml IV BID TENA


   Last Admin: 12/09/20 10:18 Dose:  10 ml


   Documented by: 


Sodium Chloride (Sodium Chloride Flush Syringe 10 Ml)  10 ml IV PRN PRN


   PRN Reason: LINE FLUSH











Exam





- Constitutional


Vitals: 


                                        











Temp Pulse Resp BP Pulse Ox


 


 97.8 F   116 H  22   90/58   97 


 


 12/08/20 19:30  12/09/20 09:00  12/09/20 09:00  12/09/20 09:00  12/09/20 09:38














Results





- Labs


CBC & Chem 7: 


                                 12/09/20 04:59





                                 12/09/20 04:59


Labs: 


                             Laboratory Last Values











WBC  17.1 K/mm3 (4.5-11.0)  H  12/09/20  04:59    


 


RBC  3.71 M/mm3 (3.65-5.03)   12/09/20  04:59    


 


Hgb  11.2 gm/dl (10.1-14.3)   12/09/20  04:59    


 


Hct  34.3 % (30.3-42.9)   12/09/20  04:59    


 


MCV  92 fl (79-97)   12/09/20  04:59    


 


MCH  30 pg (28-32)   12/09/20  04:59    


 


MCHC  33 % (30-34)   12/09/20  04:59    


 


RDW  14.0 % (13.2-15.2)   12/09/20  04:59    


 


Plt Count  521 K/mm3 (140-440)  H  12/09/20  04:59    


 


Lymph % (Auto)  7.0 % (13.4-35.0)  L  12/08/20  04:29    


 


Mono % (Auto)  11.3 % (0.0-7.3)  H  12/08/20  04:29    


 


Eos % (Auto)  0.1 % (0.0-4.3)   12/08/20  04:29    


 


Baso % (Auto)  0.3 % (0.0-1.8)   12/08/20  04:29    


 


Lymph # (Auto)  0.9 K/mm3 (1.2-5.4)  L  12/08/20  04:29    


 


Mono # (Auto)  1.4 K/mm3 (0.0-0.8)  H  12/08/20  04:29    


 


Eos # (Auto)  0.0 K/mm3 (0.0-0.4)   12/08/20  04:29    


 


Baso # (Auto)  0.0 K/mm3 (0.0-0.1)   12/08/20  04:29    


 


Seg Neutrophils %  81.3 % (40.0-70.0)  H  12/08/20  04:29    


 


Seg Neutrophils #  10.4 K/mm3 (1.8-7.7)  H  12/08/20  04:29    


 


PT  14.2 Sec. (12.2-14.9)   12/08/20  04:29    


 


INR  1.11  (0.87-1.13)   12/08/20  04:29    


 


APTT  25.8 Sec. (24.2-36.6)   12/08/20  04:29    


 


ABG pH  7.422 pH Units (7.350-7.450)   12/08/20  04:40    


 


ABG pCO2  37.9 mm Hg  12/08/20  04:40    


 


ABG pO2  58.1 mm Hg (80.0-90.0)  L  12/08/20  04:40    


 


ABG HCO3  24.2 mmol/L (20.0-26.0)   12/08/20  04:40    


 


ABG O2 Saturation  90.3 % (95.0-99.0)  L  12/08/20  04:40    


 


ABG O2 Content  15.1  (0.0-44)   12/08/20  04:40    


 


ABG Base Excess  -0.1 mmol/L (-2.0-3.0)   12/08/20  04:40    


 


ABG Hemoglobin  12.2 gm/dl (12.0-16.0)   12/08/20  04:40    


 


ABG Carboxyhemoglobin  1.5 % (0.0-5.0)   12/08/20  04:40    


 


ABG Methemoglobin  0.6 % (0.0-1.5)   12/08/20  04:40    


 


Oxyhemoglobin  88.4 % (95.0-99.0)  L  12/08/20  04:40    


 


FiO2  21 %  12/08/20  04:40    


 


Sodium  136 mmol/L (137-145)  L  12/09/20  04:59    


 


Potassium  4.1 mmol/L (3.6-5.0)   12/09/20  04:59    


 


Chloride  97.8 mmol/L ()  L  12/09/20  04:59    


 


Carbon Dioxide  20 mmol/L (22-30)  L  12/09/20  04:59    


 


Anion Gap  22 mmol/L  12/09/20  04:59    


 


BUN  13 mg/dL (7-17)   12/09/20  04:59    


 


Creatinine  0.4 mg/dL (0.6-1.2)  L  12/09/20  04:59    


 


Estimated GFR  > 60 ml/min  12/09/20  04:59    


 


BUN/Creatinine Ratio  33 %  12/09/20  04:59    


 


Glucose  110 mg/dL ()  H  12/09/20  04:59    


 


Calcium  9.1 mg/dL (8.4-10.2)   12/09/20  04:59    


 


C-Reactive Protein  12.50 mg/dL (0.00-1.30)  H  12/08/20  12:23    


 


NT-Pro-B Natriuret Pep  30577 pg/mL (0-900)  H  12/08/20  08:23    


 


TSH  0.013 mlU/mL (0.270-4.200)  L  12/08/20  08:23    


 


Thyroxine (T4)  15.4 ug/dL (4.0-12.0)  H  12/08/20  08:23    











Microbiology: 


Microbiology





12/08/20 08:23   Peripheral/Venous   Blood Culture - Preliminary


                            NO GROWTH AFTER 24 HOURS


12/08/20 08:23   Peripheral/Venous   Blood Culture - Preliminary


                            NO GROWTH AFTER 24 HOURS








Montilla/IV: 


                                        





IV Catheter Type [Right          Peripheral IV


Antecubital]                     











Assessment and Plan


Assessment and plan: 





I saw and evaluated the patient. I agree with the findings and the plan of care 

as documented in the Nurse Practitioner's~note,

## 2020-12-09 LAB
BUN SERPL-MCNC: 13 MG/DL (ref 7–17)
BUN/CREAT SERPL: 33 %
CALCIUM SERPL-MCNC: 9.1 MG/DL (ref 8.4–10.2)
HCT VFR BLD CALC: 34.3 % (ref 30.3–42.9)
HEMOLYSIS INDEX: 8
HGB BLD-MCNC: 11.2 GM/DL (ref 10.1–14.3)
MCHC RBC AUTO-ENTMCNC: 33 % (ref 30–34)
MCV RBC AUTO: 92 FL (ref 79–97)
PLATELET # BLD: 521 K/MM3 (ref 140–440)
RBC # BLD AUTO: 3.71 M/MM3 (ref 3.65–5.03)

## 2020-12-09 RX ADMIN — Medication SCH ML: at 10:18

## 2020-12-09 RX ADMIN — METRONIDAZOLE SCH: 5 INJECTION, SOLUTION INTRAVENOUS at 06:04

## 2020-12-09 RX ADMIN — MORPHINE SULFATE PRN MG: 2 INJECTION, SOLUTION INTRAMUSCULAR; INTRAVENOUS at 13:03

## 2020-12-09 RX ADMIN — BUDESONIDE SCH: 0.5 INHALANT RESPIRATORY (INHALATION) at 10:18

## 2020-12-09 RX ADMIN — CEFTRIAXONE SODIUM SCH MLS/HR: 2 INJECTION, POWDER, FOR SOLUTION INTRAMUSCULAR; INTRAVENOUS at 10:18

## 2020-12-09 RX ADMIN — Medication SCH ML: at 02:56

## 2020-12-09 RX ADMIN — BUDESONIDE SCH MG: 0.5 INHALANT RESPIRATORY (INHALATION) at 03:09

## 2020-12-09 RX ADMIN — AMIODARONE HYDROCHLORIDE SCH MLS/HR: 50 INJECTION, SOLUTION INTRAVENOUS at 16:14

## 2020-12-09 RX ADMIN — DEXTROSE AND SODIUM CHLORIDE SCH MLS/HR: 5; .9 INJECTION, SOLUTION INTRAVENOUS at 16:14

## 2020-12-09 RX ADMIN — BUDESONIDE SCH: 0.5 INHALANT RESPIRATORY (INHALATION) at 23:07

## 2020-12-09 RX ADMIN — Medication SCH ML: at 22:14

## 2020-12-09 RX ADMIN — MORPHINE SULFATE PRN MG: 2 INJECTION, SOLUTION INTRAMUSCULAR; INTRAVENOUS at 08:00

## 2020-12-09 RX ADMIN — METRONIDAZOLE SCH MLS/HR: 5 INJECTION, SOLUTION INTRAVENOUS at 22:12

## 2020-12-09 RX ADMIN — DEXTROSE AND SODIUM CHLORIDE SCH MLS/HR: 5; .9 INJECTION, SOLUTION INTRAVENOUS at 05:38

## 2020-12-09 RX ADMIN — HEPARIN SODIUM SCH UNIT: 5000 INJECTION, SOLUTION INTRAVENOUS; SUBCUTANEOUS at 22:13

## 2020-12-09 RX ADMIN — HEPARIN SODIUM SCH UNIT: 5000 INJECTION, SOLUTION INTRAVENOUS; SUBCUTANEOUS at 10:18

## 2020-12-09 RX ADMIN — HEPARIN SODIUM SCH UNIT: 5000 INJECTION, SOLUTION INTRAVENOUS; SUBCUTANEOUS at 02:53

## 2020-12-09 RX ADMIN — METRONIDAZOLE SCH MLS/HR: 5 INJECTION, SOLUTION INTRAVENOUS at 03:02

## 2020-12-09 NOTE — PROGRESS NOTE
<OLEG ANDERSON REIJoseph - Last Filed: 12/09/20 15:31>





Assessment and Plan





- Patient Problems


(1) Community acquired pneumonia


Current Visit: No   Status: Inactive   


Plan to address problem: 


12/8 CXR shows.  Upper lobe mass similar to prior however there has been 

interval increase of a masslike density in the right hilar worrisome of p

rogression of disease


12/18 CTA shows no evidence of pulmonary embolism, findings compatible with d

isease progression with interval enlargement of right upper lobe mass as well as

the right hilar mass.  Hilar mass results in obstruction of the right upper lobe

bronchus with downstream postobstructive atelectasis versus pneumonia, increased

septal thickening in the right upper lobe worrisome for lymphogenic spread of 

tumor, increase in centrilobular nodularity in the right lower lobe suggesting a

superimposed infectious inflammatory process, small right pleural effusion, 

enlargement of hepatic and right adrenal metastasis with unchanged osseous 

metastatic disease.


Antibiotic therapy


Supplemental oxygen as needed


Pulmonary hygiene


CXR


Activate pneumonia protocol


12/8 CRP 12.5








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


Current Visit: No   Status: Acute   


Plan to address problem: 


12/8 CXR shows.  Upper lobe mass similar to prior however there has been 

interval increase of a masslike density in the right hilar worrisome of 

progression of disease


12/18 CTA shows no evidence of pulmonary embolism, findings compatible with 

disease progression with interval enlargement of right upper lobe mass as well 

as the right hilar mass.  Hilar mass results in obstruction of the right upper 

lobe bronchus with downstream postobstructive atelectasis versus pneumonia, in

creased septal thickening in the right upper lobe worrisome for lymphogenic 

spread of tumor, increase in centrilobular nodularity in the right lower lobe 

suggesting a superimposed infectious inflammatory process, small right pleural 

effusion, enlargement of hepatic and right adrenal metastasis with unchanged 

osseous metastatic disease.


On last admission patient declined hospice, radiation, chemotherapy


12/8 patient decided to change her CODE STATUS to DNR/AND and elected hospice 

for further care.


Hospice consult placed


12/8 proBNP 93127








(3) Leukocytosis


Current Visit: Yes   Status: Acute   





(4) Hypochloremia


Current Visit: Yes   Status: Acute   


Plan to address problem: 


Presented with a chloride of 94.7, 12/9 Cl 97.8


S/p 1 L IV bolus in the emergency department


S/p 1500 ml IV bolus per hospitalist


Trend BMP








(5) Hyponatremia


Current Visit: No   Status: Acute   


Plan to address problem: 


Presented with a sodium of 135, 12/9 Na 136


S/p 1 L IV bolus in the emergency department


S/p 1500 ml IV bolus per hospitalist


Trend BMP








(6) Thyroiditis


Current Visit: No   Status: Acute   


Plan to address problem: 


12/8 TSH 0.013, thyroxine 15.4


S/p 5 mg IV push metoprolol in the emergency department


RN went to go push the second 5 mg of metoprolol ordered and patient went 

hypotensive with a map of 65 and systolic blood pressure of 90








(7) DVT prophylaxis


Current Visit: No   Status: Acute   


Plan to address problem: 


SCD to bilateral extremities while in bed


Heparin subcu








(8) DNR no code (do not resuscitate)


Current Visit: Yes   Status: Acute   


Plan to address problem: 


Patient decided to be a DNR/AND


Hospice consulted








History


Interval history: 


This is a 52-year-old female with recently diagnosed metastatic lung carcinoma 

with right upper lung mass presents to the hospital today complaining of shortn

ess of breath and mild nonproductive cough with loss of voice for 2 days.  Of 

note patient was offered hospice and therapy such as chemotherapy and radiation 

but she declined.  Patient was said to have a outpatient visit with Dr. Nuñez in 

2 days. On her last admission CT scan revealed metastasis to the multiple  ribs,

multiple vertebral bodies, liver and likely right adrenal gland and she also had

a CT-guided right upper lobe mass biopsy done.  Patient encouraged to follow-up 

with pulmonology and oncology outpatient.  In the emergency department patient 

presented with tachycardia, Leukocytosis, thrombocytosis, hypercalcemia and 

hypoxia.  She received a repeat CTA chest which revealed no pulmonary embolism. 

She was given 1 L of IV fluid for persistent tachycardia and given metoprolol IV

x2 which made her hypotensive in the emergency department.  At the time of my 

exam today patient states that she feels much better, remains on supplemental 

oxygen, is refusing breathing treatments per RN and is requesting liquid pain 

medication for possible.  I called the  in the emergency room today 

and make aware of hospice consult. 





12/8: Patient was hypotensive and tachycardic into the 130s and the attending 

decided to start her on amiodarone, give her saline bolus and a stat EKG r

evealed sinus tachycardia.  Patient ultimately decided to go to hospice 

yesterday evening and a consult was placed.  Patient also decided to change CODE

STATUS to allow natural death/DO NOT RESUSCITATE. 





Hospitalist Physical





- Constitutional


Vitals: 


                                        











Temp Pulse Resp BP Pulse Ox


 


 97.8 F   118 H  12   107/79   100 


 


 12/08/20 19:30  12/09/20 13:00  12/09/20 13:00  12/09/20 13:00  12/09/20 13:00











General appearance: Present: mild distress, severe distress, cachectic, 

disheveled





- EENT


Eyes: Present: PERRL, EOM intact


ENT: hearing intact, poor dentition





- Neck


Neck: Present: normal ROM





- Respiratory


Respiratory effort: normal


Respiratory: bilateral: diminished, rhonchi, wheezing





- Cardiovascular


Rhythm: regular


Heart Sounds: Present: S1 & S2.  Absent: systolic murmur, diastolic murmur





- Extremities


Extremities: no ischemia, pulses intact, pulses symmetrical, No edema, normal 

temperature, normal color, Full ROM


Peripheral Pulses: within normal limits





- Abdominal


General gastrointestinal: soft, non-tender, non-distended, normal bowel sounds





- Integumentary


Integumentary: Present: warm, dry





- Psychiatric


Psychiatric: appropriate mood/affect, cooperative





- Neurologic


Neurologic: CNII-XII intact, no focal deficits, moves all extremities





Results





- Labs


CBC & Chem 7: 


                                 12/09/20 04:59





                                 12/09/20 04:59


Labs: 


                             Laboratory Last Values











WBC  17.1 K/mm3 (4.5-11.0)  H  12/09/20  04:59    


 


RBC  3.71 M/mm3 (3.65-5.03)   12/09/20  04:59    


 


Hgb  11.2 gm/dl (10.1-14.3)   12/09/20  04:59    


 


Hct  34.3 % (30.3-42.9)   12/09/20  04:59    


 


MCV  92 fl (79-97)   12/09/20  04:59    


 


MCH  30 pg (28-32)   12/09/20  04:59    


 


MCHC  33 % (30-34)   12/09/20  04:59    


 


RDW  14.0 % (13.2-15.2)   12/09/20  04:59    


 


Plt Count  521 K/mm3 (140-440)  H  12/09/20  04:59    


 


Lymph % (Auto)  7.0 % (13.4-35.0)  L  12/08/20  04:29    


 


Mono % (Auto)  11.3 % (0.0-7.3)  H  12/08/20  04:29    


 


Eos % (Auto)  0.1 % (0.0-4.3)   12/08/20  04:29    


 


Baso % (Auto)  0.3 % (0.0-1.8)   12/08/20  04:29    


 


Lymph # (Auto)  0.9 K/mm3 (1.2-5.4)  L  12/08/20  04:29    


 


Mono # (Auto)  1.4 K/mm3 (0.0-0.8)  H  12/08/20  04:29    


 


Eos # (Auto)  0.0 K/mm3 (0.0-0.4)   12/08/20  04:29    


 


Baso # (Auto)  0.0 K/mm3 (0.0-0.1)   12/08/20  04:29    


 


Seg Neutrophils %  81.3 % (40.0-70.0)  H  12/08/20  04:29    


 


Seg Neutrophils #  10.4 K/mm3 (1.8-7.7)  H  12/08/20  04:29    


 


PT  14.2 Sec. (12.2-14.9)   12/08/20  04:29    


 


INR  1.11  (0.87-1.13)   12/08/20  04:29    


 


APTT  25.8 Sec. (24.2-36.6)   12/08/20  04:29    


 


ABG pH  7.422 pH Units (7.350-7.450)   12/08/20  04:40    


 


ABG pCO2  37.9 mm Hg  12/08/20  04:40    


 


ABG pO2  58.1 mm Hg (80.0-90.0)  L  12/08/20  04:40    


 


ABG HCO3  24.2 mmol/L (20.0-26.0)   12/08/20  04:40    


 


ABG O2 Saturation  90.3 % (95.0-99.0)  L  12/08/20  04:40    


 


ABG O2 Content  15.1  (0.0-44)   12/08/20  04:40    


 


ABG Base Excess  -0.1 mmol/L (-2.0-3.0)   12/08/20  04:40    


 


ABG Hemoglobin  12.2 gm/dl (12.0-16.0)   12/08/20  04:40    


 


ABG Carboxyhemoglobin  1.5 % (0.0-5.0)   12/08/20  04:40    


 


ABG Methemoglobin  0.6 % (0.0-1.5)   12/08/20  04:40    


 


Oxyhemoglobin  88.4 % (95.0-99.0)  L  12/08/20  04:40    


 


FiO2  21 %  12/08/20  04:40    


 


Sodium  136 mmol/L (137-145)  L  12/09/20  04:59    


 


Potassium  4.1 mmol/L (3.6-5.0)   12/09/20  04:59    


 


Chloride  97.8 mmol/L ()  L  12/09/20  04:59    


 


Carbon Dioxide  20 mmol/L (22-30)  L  12/09/20  04:59    


 


Anion Gap  22 mmol/L  12/09/20  04:59    


 


BUN  13 mg/dL (7-17)   12/09/20  04:59    


 


Creatinine  0.4 mg/dL (0.6-1.2)  L  12/09/20  04:59    


 


Estimated GFR  > 60 ml/min  12/09/20  04:59    


 


BUN/Creatinine Ratio  33 %  12/09/20  04:59    


 


Glucose  110 mg/dL ()  H  12/09/20  04:59    


 


Calcium  9.1 mg/dL (8.4-10.2)   12/09/20  04:59    


 


C-Reactive Protein  12.50 mg/dL (0.00-1.30)  H  12/08/20  12:23    


 


NT-Pro-B Natriuret Pep  40110 pg/mL (0-900)  H  12/08/20  08:23    


 


TSH  0.013 mlU/mL (0.270-4.200)  L  12/08/20  08:23    


 


Thyroxine (T4)  15.4 ug/dL (4.0-12.0)  H  12/08/20  08:23    











Microbiology: 


Microbiology





12/08/20 08:23   Peripheral/Venous   Blood Culture - Preliminary


                            NO GROWTH AFTER 24 HOURS


12/08/20 08:23   Peripheral/Venous   Blood Culture - Preliminary


                            NO GROWTH AFTER 24 HOURS








Montilla/IV: 


                                        





IV Catheter Type [Right          Peripheral IV


Antecubital]                     











Active Medications





- Current Medications


Current Medications: 














Generic Name Dose Route Start Last Admin





  Trade Name Freq  PRN Reason Stop Dose Admin


 


Acetaminophen  650 mg  12/08/20 12:23 





  Tylenol  PO  





  Q4H PRN  





  Pain MILD(1-3)/Fever >100.5/HA  


 


Albuterol  2.5 mg  12/08/20 12:16 





  Proventil  IH  





  Q3HRT PRN  





  Wheezing  


 


Budesonide  0.5 mg  12/08/20 12:30  12/09/20 10:18





  Pulmicort  IH   Not Given





  Q12HRT TENA  


 


Heparin Sodium (Porcine)  5,000 unit  12/08/20 22:00  12/09/20 10:18





  Heparin  SUB-Q   5,000 unit





  Q12HR TENA   Administration


 


Metronidazole  500 mg in 100 mls @ 100 mls/hr  12/08/20 14:00  12/09/20 06:04





  Flagyl 500 Mg/100 Ml  IV   Not Given





  Q8HR TENA  





  Protocol  


 


Ceftriaxone Sodium  2 gm in 100 mls @ 200 mls/hr  12/09/20 10:00  12/09/20 10:18





  Rocephin/Ns 2 Gm/100 Ml  IV   200 mls/hr





  Q24HR TENA   Administration





  Protocol  


 


Azithromycin 500 mg/ Sodium  250 mls @ 250 mls/hr  12/09/20 10:00 





  Chloride  IV  12/12/20 10:59 





  Q24HR TENA  





  Protocol  


 


Dextrose/Sodium Chloride  1,000 mls @ 100 mls/hr  12/08/20 13:00  12/09/20 05:38





  D5ns  IV   100 mls/hr





  AS DIRECT TENA   Administration


 


Amiodarone HCl 900 mg/  500 mls @ 33.333 mls/hr  12/08/20 13:00  12/09/20 15:01





  Dextrose  IV   Infused





  AS DIRECT TENA   Titration





  Protocol  





  1 MG/MIN  


 


Magnesium Hydroxide  30 ml  12/08/20 12:23 





  Milk Of Magnesia  PO  





  Q4H PRN  





  Constipation  


 


Morphine Sulfate  2 mg  12/08/20 12:23  12/09/20 13:03





  Morphine  IV   2 mg





  Q4H PRN   Administration





  Pain, Moderate (4-6)  


 


Ondansetron HCl  4 mg  12/08/20 12:23 





  Zofran  IV  





  Q8H PRN  





  Nausea And Vomiting  


 


Oxycodone/Acetaminophen  1 tab  12/08/20 12:23  12/09/20 15:01





  Percocet 5/325  PO   1 tab





  Q6H PRN   Administration





  Pain, Moderate (4-6)  


 


Sodium Chloride  10 ml  12/08/20 22:00  12/09/20 10:18





  Sodium Chloride Flush Syringe 10 Ml  IV   10 ml





  BID TENA   Administration


 


Sodium Chloride  10 ml  12/08/20 12:23 





  Sodium Chloride Flush Syringe 10 Ml  IV  





  PRN PRN  





  LINE FLUSH  














<NIKIA DACOSTA R - Last Filed: 12/09/20 19:09>





Assessment and Plan


Assessment and plan: 





I saw and evaluated the patient. I agree with the findings and the plan of care 

as documented in the Nurse Practitioner's~note, with the following corrections 

and additions.





Consulted for hospice


cont current care





Hospitalist Physical





- Constitutional


Vitals: 


                                        











Temp Pulse Resp BP Pulse Ox


 


 97.3 F L  112 H  24   116/74   94 


 


 12/09/20 17:30  12/09/20 17:30  12/09/20 17:30  12/09/20 17:30  12/09/20 17:30














Results





- Labs


CBC & Chem 7: 


                                 12/09/20 04:59





                                 12/09/20 04:59


Labs: 


                             Laboratory Last Values











WBC  17.1 K/mm3 (4.5-11.0)  H  12/09/20  04:59    


 


RBC  3.71 M/mm3 (3.65-5.03)   12/09/20  04:59    


 


Hgb  11.2 gm/dl (10.1-14.3)   12/09/20  04:59    


 


Hct  34.3 % (30.3-42.9)   12/09/20  04:59    


 


MCV  92 fl (79-97)   12/09/20  04:59    


 


MCH  30 pg (28-32)   12/09/20  04:59    


 


MCHC  33 % (30-34)   12/09/20  04:59    


 


RDW  14.0 % (13.2-15.2)   12/09/20  04:59    


 


Plt Count  521 K/mm3 (140-440)  H  12/09/20  04:59    


 


Lymph % (Auto)  7.0 % (13.4-35.0)  L  12/08/20  04:29    


 


Mono % (Auto)  11.3 % (0.0-7.3)  H  12/08/20  04:29    


 


Eos % (Auto)  0.1 % (0.0-4.3)   12/08/20  04:29    


 


Baso % (Auto)  0.3 % (0.0-1.8)   12/08/20  04:29    


 


Lymph # (Auto)  0.9 K/mm3 (1.2-5.4)  L  12/08/20  04:29    


 


Mono # (Auto)  1.4 K/mm3 (0.0-0.8)  H  12/08/20  04:29    


 


Eos # (Auto)  0.0 K/mm3 (0.0-0.4)   12/08/20  04:29    


 


Baso # (Auto)  0.0 K/mm3 (0.0-0.1)   12/08/20  04:29    


 


Seg Neutrophils %  81.3 % (40.0-70.0)  H  12/08/20  04:29    


 


Seg Neutrophils #  10.4 K/mm3 (1.8-7.7)  H  12/08/20  04:29    


 


PT  14.2 Sec. (12.2-14.9)   12/08/20  04:29    


 


INR  1.11  (0.87-1.13)   12/08/20  04:29    


 


APTT  25.8 Sec. (24.2-36.6)   12/08/20  04:29    


 


ABG pH  7.422 pH Units (7.350-7.450)   12/08/20  04:40    


 


ABG pCO2  37.9 mm Hg  12/08/20  04:40    


 


ABG pO2  58.1 mm Hg (80.0-90.0)  L  12/08/20  04:40    


 


ABG HCO3  24.2 mmol/L (20.0-26.0)   12/08/20  04:40    


 


ABG O2 Saturation  90.3 % (95.0-99.0)  L  12/08/20  04:40    


 


ABG O2 Content  15.1  (0.0-44)   12/08/20  04:40    


 


ABG Base Excess  -0.1 mmol/L (-2.0-3.0)   12/08/20  04:40    


 


ABG Hemoglobin  12.2 gm/dl (12.0-16.0)   12/08/20  04:40    


 


ABG Carboxyhemoglobin  1.5 % (0.0-5.0)   12/08/20  04:40    


 


ABG Methemoglobin  0.6 % (0.0-1.5)   12/08/20  04:40    


 


Oxyhemoglobin  88.4 % (95.0-99.0)  L  12/08/20  04:40    


 


FiO2  21 %  12/08/20  04:40    


 


Sodium  136 mmol/L (137-145)  L  12/09/20  04:59    


 


Potassium  4.1 mmol/L (3.6-5.0)   12/09/20  04:59    


 


Chloride  97.8 mmol/L ()  L  12/09/20  04:59    


 


Carbon Dioxide  20 mmol/L (22-30)  L  12/09/20  04:59    


 


Anion Gap  22 mmol/L  12/09/20  04:59    


 


BUN  13 mg/dL (7-17)   12/09/20  04:59    


 


Creatinine  0.4 mg/dL (0.6-1.2)  L  12/09/20  04:59    


 


Estimated GFR  > 60 ml/min  12/09/20  04:59    


 


BUN/Creatinine Ratio  33 %  12/09/20  04:59    


 


Glucose  110 mg/dL ()  H  12/09/20  04:59    


 


Calcium  9.1 mg/dL (8.4-10.2)   12/09/20  04:59    


 


C-Reactive Protein  12.50 mg/dL (0.00-1.30)  H  12/08/20  12:23    


 


NT-Pro-B Natriuret Pep  46336 pg/mL (0-900)  H  12/08/20  08:23    


 


TSH  0.013 mlU/mL (0.270-4.200)  L  12/08/20  08:23    


 


Thyroxine (T4)  15.4 ug/dL (4.0-12.0)  H  12/08/20  08:23    











Microbiology: 


Microbiology





12/08/20 08:23   Peripheral/Venous   Blood Culture - Preliminary


                            NO GROWTH AFTER 24 HOURS


12/08/20 08:23   Peripheral/Venous   Blood Culture - Preliminary


                            NO GROWTH AFTER 24 HOURS








Montilla/IV: 


                                        





Voiding Method                   Diaper


IV Catheter Type [Right          Peripheral IV


Antecubital]                     











Active Medications





- Current Medications


Current Medications: 














Generic Name Dose Route Start Last Admin





  Trade Name Freq  PRN Reason Stop Dose Admin


 


Acetaminophen  650 mg  12/08/20 12:23 





  Tylenol  PO  





  Q4H PRN  





  Pain MILD(1-3)/Fever >100.5/HA  


 


Hydrocodone Bitart/Acetaminophen  7.5 mg  12/09/20 15:27 





  Hydrocodone/Apap 7.5-325  PO  





  Q4H PRN  





  Pain, Moderate (4-6)  


 


Albuterol  2.5 mg  12/08/20 12:16 





  Proventil  IH  





  Q3HRT PRN  





  Wheezing  


 


Budesonide  0.5 mg  12/08/20 12:30  12/09/20 10:18





  Pulmicort  IH   Not Given





  Q12HRT TENA  


 


Heparin Sodium (Porcine)  5,000 unit  12/08/20 22:00  12/09/20 10:18





  Heparin  SUB-Q   5,000 unit





  Q12HR TENA   Administration


 


Metronidazole  500 mg in 100 mls @ 100 mls/hr  12/08/20 14:00  12/09/20 06:04





  Flagyl 500 Mg/100 Ml  IV   Not Given





  Q8HR TENA  





  Protocol  


 


Ceftriaxone Sodium  2 gm in 100 mls @ 200 mls/hr  12/09/20 10:00  12/09/20 10:18





  Rocephin/Ns 2 Gm/100 Ml  IV   200 mls/hr





  Q24HR TENA   Administration





  Protocol  


 


Azithromycin 500 mg/ Sodium  250 mls @ 250 mls/hr  12/09/20 10:00 





  Chloride  IV  12/12/20 10:59 





  Q24HR TENA  





  Protocol  


 


Dextrose/Sodium Chloride  1,000 mls @ 100 mls/hr  12/08/20 13:00  12/09/20 16:14





  D5ns  IV   100 mls/hr





  AS DIRECT TENA   Administration


 


Amiodarone HCl 900 mg/  500 mls @ 33.333 mls/hr  12/08/20 13:00  12/09/20 16:14





  Dextrose  IV   0.5 mg/min





  AS DIRECT TENA   16.667 mls/hr





    Administration





  Protocol  





  1 MG/MIN  


 


Magnesium Hydroxide  30 ml  12/08/20 12:23 





  Milk Of Magnesia  PO  





  Q4H PRN  





  Constipation  


 


Morphine Sulfate  2 mg  12/08/20 12:23  12/09/20 13:03





  Morphine  IV   2 mg





  Q4H PRN   Administration





  Pain, Moderate (4-6)  


 


Ondansetron HCl  4 mg  12/08/20 12:23 





  Zofran  IV  





  Q8H PRN  





  Nausea And Vomiting  


 


Sodium Chloride  10 ml  12/08/20 22:00  12/09/20 10:18





  Sodium Chloride Flush Syringe 10 Ml  IV   10 ml





  BID TENA   Administration


 


Sodium Chloride  10 ml  12/08/20 12:23 





  Sodium Chloride Flush Syringe 10 Ml  IV  





  PRN PRN  





  LINE FLUSH

## 2020-12-09 NOTE — EVENT NOTE
Date: 12/09/20





Patient does not want any chemo or radiation therapy for her stage IV lung 

cancer


She also wants to be DNR


Patient is requesting home hospice service


Consult placed to case management for home hospice set up.


Continue supportive care, monitor vitals

## 2020-12-10 RX ADMIN — AZITHROMYCIN SCH: 500 INJECTION, POWDER, LYOPHILIZED, FOR SOLUTION INTRAVENOUS at 13:37

## 2020-12-10 RX ADMIN — HEPARIN SODIUM SCH UNIT: 5000 INJECTION, SOLUTION INTRAVENOUS; SUBCUTANEOUS at 23:40

## 2020-12-10 RX ADMIN — BUDESONIDE SCH: 0.5 INHALANT RESPIRATORY (INHALATION) at 07:29

## 2020-12-10 RX ADMIN — MORPHINE SULFATE PRN MG: 2 INJECTION, SOLUTION INTRAMUSCULAR; INTRAVENOUS at 23:50

## 2020-12-10 RX ADMIN — CEFTRIAXONE SODIUM SCH MLS/HR: 2 INJECTION, POWDER, FOR SOLUTION INTRAMUSCULAR; INTRAVENOUS at 09:55

## 2020-12-10 RX ADMIN — METRONIDAZOLE SCH MLS/HR: 5 INJECTION, SOLUTION INTRAVENOUS at 23:40

## 2020-12-10 RX ADMIN — HEPARIN SODIUM SCH UNIT: 5000 INJECTION, SOLUTION INTRAVENOUS; SUBCUTANEOUS at 13:39

## 2020-12-10 RX ADMIN — MORPHINE SULFATE PRN MG: 2 INJECTION, SOLUTION INTRAMUSCULAR; INTRAVENOUS at 14:32

## 2020-12-10 RX ADMIN — Medication SCH ML: at 09:50

## 2020-12-10 RX ADMIN — BUDESONIDE SCH: 0.5 INHALANT RESPIRATORY (INHALATION) at 20:28

## 2020-12-10 RX ADMIN — AZITHROMYCIN SCH MLS/HR: 500 INJECTION, POWDER, LYOPHILIZED, FOR SOLUTION INTRAVENOUS at 13:33

## 2020-12-10 RX ADMIN — AZITHROMYCIN SCH MLS/HR: 500 INJECTION, POWDER, LYOPHILIZED, FOR SOLUTION INTRAVENOUS at 13:37

## 2020-12-10 RX ADMIN — MORPHINE SULFATE PRN MG: 2 INJECTION, SOLUTION INTRAMUSCULAR; INTRAVENOUS at 09:50

## 2020-12-10 RX ADMIN — MORPHINE SULFATE PRN MG: 2 INJECTION, SOLUTION INTRAMUSCULAR; INTRAVENOUS at 03:15

## 2020-12-10 RX ADMIN — METRONIDAZOLE SCH MLS/HR: 5 INJECTION, SOLUTION INTRAVENOUS at 14:34

## 2020-12-10 RX ADMIN — Medication SCH ML: at 23:40

## 2020-12-10 RX ADMIN — METRONIDAZOLE SCH: 5 INJECTION, SOLUTION INTRAVENOUS at 14:34

## 2020-12-10 RX ADMIN — METRONIDAZOLE SCH: 5 INJECTION, SOLUTION INTRAVENOUS at 14:33

## 2020-12-10 NOTE — DISCHARGE SUMMARY
<NIKIA DACOSTA - Last Filed: 12/11/20 09:05>





Providers





- Providers


Date of Admission: 


12/08/20 09:49





Date of discharge: 12/11/20


Attending physician: 


NIKIA DACOSTA





                                        





12/09/20 10:42


Consult to Case Management [CONS] Routine 


   Services Needed at Discharge: Other


   Notified:: kiran


   Additional Physician Instructions: home hospice











Primary care physician: 


PRIMARY CARE MD








Hospitalization


Condition: Stable


Disposition: DC-50 TO HOSPICE (HOME)





Exam





- Constitutional


Vitals: 


                                        











Temp Pulse Resp BP Pulse Ox


 


 97.7 F   124 H  16   106/64   91 


 


 12/11/20 04:21  12/11/20 04:21  12/11/20 04:21  12/11/20 04:21  12/11/20 04:21














Plan


Follow up with: 


PRIMARY CARE,MD [Primary Care Provider] - 3-5 Days


Prescriptions: 


methIMAzole [Tapazole] 5 mg PO Q8HR #90 tablet





<OLEG ANDERSON - Last Filed: 12/11/20 12:47>





Providers





- Providers


Date of Admission: 


12/08/20 09:49





Date of discharge: 12/10/20


Attending physician: 


NIKIA DACOSTA





                                        





12/09/20 10:42


Consult to Case Management [CONS] Routine 


   Services Needed at Discharge: Other


   Notified:: kiran


   Additional Physician Instructions: home hospice











Primary care physician: 


PRIMARY CARE MD








Hospitalization


Hospital course: 


This is a 52-year-old female with recently diagnosed metastatic lung carcinoma 

with right upper lung mass presents to the hospital today complaining of 

shortness of breath and mild nonproductive cough with loss of voice for 2 days. 

 Of note patient was offered hospice and therapy such as chemotherapy and 

radiation but she declined.  Patient was said to have a outpatient visit with 

Dr. Nuñez in 2 days. On her last admission CT scan revealed metastasis to the 

multiple  ribs, multiple vertebral bodies, liver and likely right adrenal gland 

and she also had a CT-guided right upper lobe mass biopsy done.  Patient 

encouraged to follow-up with pulmonology and oncology outpatient.  In the 

emergency department patient presented with tachycardia, Leukocytosis, 

thrombocytosis, hypercalcemia and hypoxia.  She received a repeat CTA chest 

which revealed no pulmonary embolism.  She was given 1 L of IV fluid for 

persistent tachycardia and given metoprolol IV x2 which made her hypotensive in 

the emergency department. Patient was hypotensive and tachycardic into the 130s 

and the attending decided to start her on amiodarone, give her saline bolus and 

a stat EKG revealed sinus tachycardia.  Patient ultimately decided to go to 

hospice yesterday evening and a consult was placed.  Patient also decided to 

change CODE STATUS to allow natural death/DO NOT RESUSCITATE.  Patient will be 

discharging home with hospice care.








 Patient Problems


(1) Community acquired pneumonia


Current Visit: No   Status: Inactive   


Plan to address problem: 


12/8 CXR shows.  Upper lobe mass similar to prior however there has been 

interval increase of a masslike density in the right hilar worrisome of 

progression of disease


12/18 CTA shows no evidence of pulmonary embolism, findings compatible with 

disease progression with interval enlargement of right upper lobe mass as well 

as the right hilar mass.  Hilar mass results in obstruction of the right upper 

lobe bronchus with downstream postobstructive atelectasis versus pneumonia, 

increased septal thickening in the right upper lobe worrisome for lymphogenic 

spread of tumor, increase in centrilobular nodularity in the right lower lobe 

suggesting a superimposed infectious inflammatory process, small right pleural 

effusion, enlargement of hepatic and right adrenal metastasis with unchanged 

osseous metastatic disease.


12/8 CRP 12.5


Antibiotic therapy discontinued, patient will be discharged with home hospice 





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


Current Visit: No   Status: Chronic


Plan to address problem: 


12/8 CXR shows.  Upper lobe mass similar to prior however there has been 

interval increase of a masslike density in the right hilar worrisome of 

progression of disease


12/18 CTA shows no evidence of pulmonary embolism, findings compatible with 

disease progression with interval enlargement of right upper lobe mass as well 

as the right hilar mass.  Hilar mass results in obstruction of the right upper 

lobe bronchus with downstream postobstructive atelectasis versus pneumonia, 

increased septal thickening in the right upper lobe worrisome for lymphogenic 

spread of tumor, increase in centrilobular nodularity in the right lower lobe 

suggesting a superimposed infectious inflammatory process, small right pleural 

effusion, enlargement of hepatic and right adrenal metastasis with unchanged 

osseous metastatic disease.


On last admission patient declined hospice, radiation, chemotherapy


12/8 patient decided to change her CODE STATUS to DNR/AND and elected hospice 

for further care.


Hospice consult placed


12/8 proBNP 65546


Patient will be discharged to home hospice





(3) Leukocytosis


Current Visit: Yes   Status: Acute   


Presented with very WBC count of 12.7, 12/9 increased to 17.1


Likely reactive to metastatic cancer





(4) Hypochloremia


Current Visit: Yes   Status: Acute   


Plan to address problem: 


Presented with a chloride of 94.7, 12/9 Cl 97.8


S/p 1 L IV bolus in the emergency department


S/p 1500 ml IV bolus per hospitalist





(5) Hyponatremia


Current Visit: No   Status: Acute   


Plan to address problem: 


Presented with a sodium of 135, 12/9 Na 136


S/p 1 L IV bolus in the emergency department


S/p 1500 ml IV bolus per hospitalist





(6) Hyperthyroidism


Current Visit: No   Status: Acute   


Plan to address problem: 


12/8 TSH 0.013, thyroxine 15.4


Will be discharged with Methimazole 5mg [id number]





(8) DNR no code (do not resuscitate)


Current Visit: Yes   Status: Acute   


Plan to address problem: 


Patient decided to be a DNR/AND


Hospice consulted








Time spent for discharge: 35





Core Measure Documentation





- Palliative Care


Palliative Care/ Comfort Measures: Hospice Care





- Core Measures


Any of the following diagnoses?: none





Exam





- Constitutional


Vitals: 


                                        











Temp Pulse Resp BP Pulse Ox


 


 97.6 F   124 H  22   114/77   93 


 


 12/10/20 12:28  12/10/20 12:28  12/10/20 12:28  12/10/20 12:28  12/10/20 12:28











General appearance: Present: mild distress, cachectic





- EENT


Eyes: Present: PERRL, EOM intact


ENT: hearing intact, dentition normal, poor dentition





- Neck


Neck: Present: normal ROM





- Respiratory


Respiratory effort: normal, labored


Respiratory: bilateral: diminished, rhonchi





- Cardiovascular


Rhythm: regular


Heart Sounds: Present: S1 & S2.  Absent: systolic murmur, diastolic murmur





- Extremities


Extremities: no ischemia, pulses intact, pulses symmetrical, No edema, normal 

temperature, Full ROM


Peripheral Pulses: within normal limits





- Abdominal


General gastrointestinal: Present: soft, non-tender, non-distended, normal bowel

 sounds





- Integumentary


Integumentary: Present: clear, warm, dry





- Musculoskeletal


Musculoskeletal: strength equal bilaterally





- Psychiatric


Psychiatric: appropriate mood/affect, cooperative





- Neurologic


Neurologic: CNII-XII intact, no focal deficits, moves all extremities





Plan


Activity: advance as tolerated


Diet: regular, advance as tolerated


Special Instructions: home hospice


Additional Instructions: Present to nearest emergency department or contact 

primary care physician if experience worsening symptoms.  You will be discharged

 with home hospice.

## 2020-12-11 VITALS — DIASTOLIC BLOOD PRESSURE: 64 MMHG | SYSTOLIC BLOOD PRESSURE: 106 MMHG

## 2020-12-11 RX ADMIN — MORPHINE SULFATE PRN MG: 2 INJECTION, SOLUTION INTRAMUSCULAR; INTRAVENOUS at 03:55

## 2020-12-11 RX ADMIN — BUDESONIDE SCH: 0.5 INHALANT RESPIRATORY (INHALATION) at 08:46

## 2020-12-11 RX ADMIN — METRONIDAZOLE SCH MLS/HR: 5 INJECTION, SOLUTION INTRAVENOUS at 05:56
